# Patient Record
Sex: FEMALE | Race: BLACK OR AFRICAN AMERICAN | NOT HISPANIC OR LATINO | URBAN - METROPOLITAN AREA
[De-identification: names, ages, dates, MRNs, and addresses within clinical notes are randomized per-mention and may not be internally consistent; named-entity substitution may affect disease eponyms.]

---

## 2018-03-05 ENCOUNTER — INPATIENT (INPATIENT)
Facility: HOSPITAL | Age: 48
LOS: 3 days | Discharge: HOME | DRG: 262 | End: 2018-03-09
Attending: INTERNAL MEDICINE | Admitting: INTERNAL MEDICINE
Payer: COMMERCIAL

## 2018-03-05 VITALS
TEMPERATURE: 98 F | SYSTOLIC BLOOD PRESSURE: 143 MMHG | DIASTOLIC BLOOD PRESSURE: 76 MMHG | HEART RATE: 39 BPM | OXYGEN SATURATION: 100 % | RESPIRATION RATE: 18 BRPM

## 2018-03-05 DIAGNOSIS — R06.09 OTHER FORMS OF DYSPNEA: ICD-10-CM

## 2018-03-05 DIAGNOSIS — I47.1 SUPRAVENTRICULAR TACHYCARDIA: ICD-10-CM

## 2018-03-05 DIAGNOSIS — F41.9 ANXIETY DISORDER, UNSPECIFIED: ICD-10-CM

## 2018-03-05 DIAGNOSIS — R07.89 OTHER CHEST PAIN: ICD-10-CM

## 2018-03-05 LAB
ALBUMIN SERPL ELPH-MCNC: 3.8 G/DL — SIGNIFICANT CHANGE UP (ref 3.3–5)
ALP SERPL-CCNC: 74 U/L — SIGNIFICANT CHANGE UP (ref 40–120)
ALT FLD-CCNC: 11 U/L RC — SIGNIFICANT CHANGE UP (ref 10–45)
ANION GAP SERPL CALC-SCNC: 11 MMOL/L — SIGNIFICANT CHANGE UP (ref 5–17)
APPEARANCE UR: CLEAR — SIGNIFICANT CHANGE UP
APTT BLD: 30.8 SEC — SIGNIFICANT CHANGE UP (ref 27.5–37.4)
AST SERPL-CCNC: 22 U/L — SIGNIFICANT CHANGE UP (ref 10–40)
BASOPHILS # BLD AUTO: 0.1 K/UL — SIGNIFICANT CHANGE UP (ref 0–0.2)
BASOPHILS NFR BLD AUTO: 1.1 % — SIGNIFICANT CHANGE UP (ref 0–2)
BILIRUB SERPL-MCNC: 0.4 MG/DL — SIGNIFICANT CHANGE UP (ref 0.2–1.2)
BILIRUB UR-MCNC: NEGATIVE — SIGNIFICANT CHANGE UP
BUN SERPL-MCNC: 15 MG/DL — SIGNIFICANT CHANGE UP (ref 7–23)
CALCIUM SERPL-MCNC: 9.3 MG/DL — SIGNIFICANT CHANGE UP (ref 8.4–10.5)
CHLORIDE SERPL-SCNC: 103 MMOL/L — SIGNIFICANT CHANGE UP (ref 96–108)
CK MB CFR SERPL CALC: 1.4 NG/ML — SIGNIFICANT CHANGE UP (ref 0–3.8)
CK SERPL-CCNC: 90 U/L — SIGNIFICANT CHANGE UP (ref 25–170)
CO2 SERPL-SCNC: 26 MMOL/L — SIGNIFICANT CHANGE UP (ref 22–31)
COLOR SPEC: YELLOW — SIGNIFICANT CHANGE UP
CREAT SERPL-MCNC: 1.02 MG/DL — SIGNIFICANT CHANGE UP (ref 0.5–1.3)
D DIMER BLD IA.RAPID-MCNC: 338 NG/ML DDU — HIGH
DIFF PNL FLD: ABNORMAL
EOSINOPHIL # BLD AUTO: 0.1 K/UL — SIGNIFICANT CHANGE UP (ref 0–0.5)
EOSINOPHIL NFR BLD AUTO: 1.7 % — SIGNIFICANT CHANGE UP (ref 0–6)
EPI CELLS # UR: SIGNIFICANT CHANGE UP /HPF
GLUCOSE SERPL-MCNC: 89 MG/DL — SIGNIFICANT CHANGE UP (ref 70–99)
GLUCOSE UR QL: NEGATIVE — SIGNIFICANT CHANGE UP
HCT VFR BLD CALC: 41.5 % — SIGNIFICANT CHANGE UP (ref 34.5–45)
HGB BLD-MCNC: 14 G/DL — SIGNIFICANT CHANGE UP (ref 11.5–15.5)
INR BLD: 1.1 RATIO — SIGNIFICANT CHANGE UP (ref 0.88–1.16)
KETONES UR-MCNC: NEGATIVE — SIGNIFICANT CHANGE UP
LEUKOCYTE ESTERASE UR-ACNC: NEGATIVE — SIGNIFICANT CHANGE UP
LYMPHOCYTES # BLD AUTO: 1.5 K/UL — SIGNIFICANT CHANGE UP (ref 1–3.3)
LYMPHOCYTES # BLD AUTO: 30 % — SIGNIFICANT CHANGE UP (ref 13–44)
MCHC RBC-ENTMCNC: 31.5 PG — SIGNIFICANT CHANGE UP (ref 27–34)
MCHC RBC-ENTMCNC: 33.7 GM/DL — SIGNIFICANT CHANGE UP (ref 32–36)
MCV RBC AUTO: 93.5 FL — SIGNIFICANT CHANGE UP (ref 80–100)
MONOCYTES # BLD AUTO: 0.6 K/UL — SIGNIFICANT CHANGE UP (ref 0–0.9)
MONOCYTES NFR BLD AUTO: 12.6 % — SIGNIFICANT CHANGE UP (ref 2–14)
NEUTROPHILS # BLD AUTO: 2.7 K/UL — SIGNIFICANT CHANGE UP (ref 1.8–7.4)
NEUTROPHILS NFR BLD AUTO: 54.6 % — SIGNIFICANT CHANGE UP (ref 43–77)
NITRITE UR-MCNC: NEGATIVE — SIGNIFICANT CHANGE UP
NT-PROBNP SERPL-SCNC: 158 PG/ML — SIGNIFICANT CHANGE UP (ref 0–300)
PH UR: 7.5 — SIGNIFICANT CHANGE UP (ref 5–8)
PLATELET # BLD AUTO: 195 K/UL — SIGNIFICANT CHANGE UP (ref 150–400)
POTASSIUM SERPL-MCNC: 4.4 MMOL/L — SIGNIFICANT CHANGE UP (ref 3.5–5.3)
POTASSIUM SERPL-SCNC: 4.4 MMOL/L — SIGNIFICANT CHANGE UP (ref 3.5–5.3)
PROT SERPL-MCNC: 7.6 G/DL — SIGNIFICANT CHANGE UP (ref 6–8.3)
PROT UR-MCNC: SIGNIFICANT CHANGE UP
PROTHROM AB SERPL-ACNC: 11.9 SEC — SIGNIFICANT CHANGE UP (ref 9.8–12.7)
RBC # BLD: 4.44 M/UL — SIGNIFICANT CHANGE UP (ref 3.8–5.2)
RBC # FLD: 11.9 % — SIGNIFICANT CHANGE UP (ref 10.3–14.5)
RBC CASTS # UR COMP ASSIST: SIGNIFICANT CHANGE UP /HPF (ref 0–2)
SODIUM SERPL-SCNC: 140 MMOL/L — SIGNIFICANT CHANGE UP (ref 135–145)
SP GR SPEC: >1.03 — HIGH (ref 1.01–1.02)
TROPONIN T SERPL-MCNC: <0.01 NG/ML — SIGNIFICANT CHANGE UP (ref 0–0.06)
UROBILINOGEN FLD QL: NEGATIVE — SIGNIFICANT CHANGE UP
WBC # BLD: 4.9 K/UL — SIGNIFICANT CHANGE UP (ref 3.8–10.5)
WBC # FLD AUTO: 4.9 K/UL — SIGNIFICANT CHANGE UP (ref 3.8–10.5)

## 2018-03-05 PROCEDURE — 93010 ELECTROCARDIOGRAM REPORT: CPT

## 2018-03-05 PROCEDURE — 99285 EMERGENCY DEPT VISIT HI MDM: CPT | Mod: 25

## 2018-03-05 PROCEDURE — 71046 X-RAY EXAM CHEST 2 VIEWS: CPT | Mod: 26

## 2018-03-05 PROCEDURE — 99223 1ST HOSP IP/OBS HIGH 75: CPT | Mod: AI

## 2018-03-05 PROCEDURE — 71275 CT ANGIOGRAPHY CHEST: CPT | Mod: 26

## 2018-03-05 RX ORDER — ESCITALOPRAM OXALATE 10 MG/1
10 TABLET, FILM COATED ORAL DAILY
Qty: 0 | Refills: 0 | Status: DISCONTINUED | OUTPATIENT
Start: 2018-03-05 | End: 2018-03-09

## 2018-03-05 RX ORDER — ASPIRIN/CALCIUM CARB/MAGNESIUM 324 MG
324 TABLET ORAL ONCE
Qty: 0 | Refills: 0 | Status: COMPLETED | OUTPATIENT
Start: 2018-03-05 | End: 2018-03-05

## 2018-03-05 RX ORDER — FLUTICASONE PROPIONATE 50 MCG
1 SPRAY, SUSPENSION NASAL DAILY
Qty: 0 | Refills: 0 | Status: DISCONTINUED | OUTPATIENT
Start: 2018-03-05 | End: 2018-03-09

## 2018-03-05 RX ORDER — INFLUENZA VIRUS VACCINE 15; 15; 15; 15 UG/.5ML; UG/.5ML; UG/.5ML; UG/.5ML
0.5 SUSPENSION INTRAMUSCULAR ONCE
Qty: 0 | Refills: 0 | Status: COMPLETED | OUTPATIENT
Start: 2018-03-05 | End: 2018-03-05

## 2018-03-05 RX ORDER — HEPARIN SODIUM 5000 [USP'U]/ML
5000 INJECTION INTRAVENOUS; SUBCUTANEOUS EVERY 12 HOURS
Qty: 0 | Refills: 0 | Status: DISCONTINUED | OUTPATIENT
Start: 2018-03-05 | End: 2018-03-09

## 2018-03-05 RX ADMIN — Medication 324 MILLIGRAM(S): at 18:32

## 2018-03-05 NOTE — ED ADULT NURSE NOTE - OBJECTIVE STATEMENT
Pt sent in to r/o PE pt has had SHEETS for 2 weeks and shortness of breath Pox 100% Pt has heavy menses at this time.  pt is bradycardic in the 40s and is her norm. IVL placed and meds and bloods sent as ordered Eri

## 2018-03-05 NOTE — ED ADULT NURSE REASSESSMENT NOTE - NS ED NURSE REASSESS COMMENT FT1
1712 Pt placed in the hallway in red EKG done in triage and ot fully clothed and placed on the monitor.  MD at bedside to evaluate pt.  As per md no room need Mpuleoprn

## 2018-03-05 NOTE — H&P ADULT - HISTORY OF PRESENT ILLNESS
47 f h/o SVT s/p ablation, h/o migraine a/w worsening sob x 24 hours and left sided cp radiating to left arm. 47 f h/o SVT s/p ablation, h/o migraine a/w worsening sob x 24 hours and left sided cp radiating to left arm.  Pt states history began back 12/07 with similar sob/cp.  Went to OSH and told MVP/stress by hospital and outpt cards until similar episode 2012.  Saw Dr. Orta, patrick, and diagnosed with SVT.  s/p ablation with improved HR but occasionally would peak at 180s with exercise to started acebutolol.  HR then went to 40s and pt states this has been her baseline HR since 2012.  Pt states in last 6 years occasional, brief episodes of similar sob/cp but resolve quickly and pt has never sought medical attention.    Pt now states she developed the sob/cp approximately 24 hrs ago suddenly but this episode is not abating.  States symptoms become worse while talking and with any form of exertion.  Pt states cp starts at shoulder and spreads down across L.thorax into left abdomen and down to left leg.  She describes it as piercing and sharp.    She saw Dr. Orta this afternoon who sent pt to ED after she tried to walk and stated she was unable 2nd sensation of "darkness" and unsteadiness though denies light-headedness.  Pt states she had another brief episode of this accompanied by nausea last week when she started a new vitamin.  After symptoms developed she read label and realized it contained caffeine.  Pt has not taken this med again and symptoms that day resolved quickly.  No f/c.  Nl appetite.  No sick contacts.  No uri symptoms.  No gu/gi symptoms.  No s/s bleeding.      In ED reviewed tele with tech and hr fluctuant between high 30s and high 40s.  Given ASA 325mg and flu shot.

## 2018-03-05 NOTE — H&P ADULT - PROBLEM SELECTOR PLAN 1
given pain is reproducible doubt cardiac etiology though given cardiac history will r/o ACS with Manas x 3  monitor on telemetry for arrhythmia  new t depression in V3 only, repeat ekg in am for trend

## 2018-03-05 NOTE — H&P ADULT - NSHPLABSRESULTS_GEN_ALL_CORE
labs/studies/ekg reviewed personally by me  CT chest pectus excavatum, no PE, clear lungs  cbc wnl  coags wnl  bnp 158  ce neg x 1  bun 15, creat 1.02  gluc 89

## 2018-03-05 NOTE — H&P ADULT - PROBLEM SELECTOR PLAN 3
no evidence of SVT thus far  will need to hold beta blocker with current bradycardia, if pt develops SVT ? PPM with beta blocker - per cardiology

## 2018-03-05 NOTE — H&P ADULT - PROBLEM SELECTOR PLAN 2
unclear etiology  CTA negative for PE or pulmonary pathology  will ck TTE to evaluate cardiac function and valves  doubt 2nd bradycardia which is chronic per pt on beta blocker

## 2018-03-05 NOTE — H&P ADULT - PROBLEM SELECTOR PLAN 4
pt requires education on increased efficacy of lexapro if taken daily as she only takes this 3-4x/week  pt states uses ativan 1-2/x for anxiety

## 2018-03-05 NOTE — ED PROVIDER NOTE - MEDICAL DECISION MAKING DETAILS
pt with significant SHEETS since yesterday and L sided CP. will obtain ekg, place on tele, labs including d-dimer, cardiac enzymes and admit.

## 2018-03-05 NOTE — ED PROVIDER NOTE - PROGRESS NOTE DETAILS
attending Alee: pt's cardiologist Dr. Marivel hui attending Alee: discussed with Dr. Orta's NP. agrees with d-dimer, enzymes and check for anemia given recent heavy menses and SHEETS. Pt will require admission, plan to admit under Dr. Nichole.

## 2018-03-05 NOTE — ED PROVIDER NOTE - OBJECTIVE STATEMENT
attending Alee: 47yF h/o prior SVT s/p ablation presents with sudden onset SOB x 24 hours. SOB worse on exertion and with talking. Also with associated L sided chest pain radiating to L arm. Seen by cardiologist Dr. Orta today and sent into ER for evaluation. LMP several days ago with heavy vaginal bleeding but denies prior anemia or blood transfusions. Denies syncope, LE edema, calf pain, recent travel/hospitalizations, exogenous estrogen or prior DVT or PE.

## 2018-03-06 DIAGNOSIS — R31.29 OTHER MICROSCOPIC HEMATURIA: ICD-10-CM

## 2018-03-06 LAB
ANION GAP SERPL CALC-SCNC: 11 MMOL/L — SIGNIFICANT CHANGE UP (ref 5–17)
APPEARANCE UR: ABNORMAL
BASOPHILS # BLD AUTO: 0 K/UL — SIGNIFICANT CHANGE UP (ref 0–0.2)
BASOPHILS NFR BLD AUTO: 0.8 % — SIGNIFICANT CHANGE UP (ref 0–2)
BILIRUB UR-MCNC: NEGATIVE — SIGNIFICANT CHANGE UP
BUN SERPL-MCNC: 15 MG/DL — SIGNIFICANT CHANGE UP (ref 7–23)
CALCIUM SERPL-MCNC: 9.1 MG/DL — SIGNIFICANT CHANGE UP (ref 8.4–10.5)
CHLORIDE SERPL-SCNC: 104 MMOL/L — SIGNIFICANT CHANGE UP (ref 96–108)
CK MB BLD-MCNC: 1.3 % — SIGNIFICANT CHANGE UP (ref 0–3.5)
CK MB CFR SERPL CALC: 1 NG/ML — SIGNIFICANT CHANGE UP (ref 0–3.8)
CK MB CFR SERPL CALC: 1.1 NG/ML — SIGNIFICANT CHANGE UP (ref 0–3.8)
CK SERPL-CCNC: 78 U/L — SIGNIFICANT CHANGE UP (ref 25–170)
CK SERPL-CCNC: 80 U/L — SIGNIFICANT CHANGE UP (ref 25–170)
CO2 SERPL-SCNC: 26 MMOL/L — SIGNIFICANT CHANGE UP (ref 22–31)
COLOR SPEC: YELLOW — SIGNIFICANT CHANGE UP
CREAT SERPL-MCNC: 0.99 MG/DL — SIGNIFICANT CHANGE UP (ref 0.5–1.3)
DIFF PNL FLD: ABNORMAL
EOSINOPHIL # BLD AUTO: 0.1 K/UL — SIGNIFICANT CHANGE UP (ref 0–0.5)
EOSINOPHIL NFR BLD AUTO: 1.7 % — SIGNIFICANT CHANGE UP (ref 0–6)
EPI CELLS # UR: SIGNIFICANT CHANGE UP /HPF
GLUCOSE SERPL-MCNC: 119 MG/DL — HIGH (ref 70–99)
GLUCOSE UR QL: NEGATIVE — SIGNIFICANT CHANGE UP
HCG SERPL-ACNC: <2 MIU/ML — LOW (ref 5–24)
HCT VFR BLD CALC: 39.2 % — SIGNIFICANT CHANGE UP (ref 34.5–45)
HGB BLD-MCNC: 13.5 G/DL — SIGNIFICANT CHANGE UP (ref 11.5–15.5)
KETONES UR-MCNC: NEGATIVE — SIGNIFICANT CHANGE UP
LEUKOCYTE ESTERASE UR-ACNC: NEGATIVE — SIGNIFICANT CHANGE UP
LYMPHOCYTES # BLD AUTO: 1.2 K/UL — SIGNIFICANT CHANGE UP (ref 1–3.3)
LYMPHOCYTES # BLD AUTO: 26.7 % — SIGNIFICANT CHANGE UP (ref 13–44)
MAGNESIUM SERPL-MCNC: 2 MG/DL — SIGNIFICANT CHANGE UP (ref 1.6–2.6)
MCHC RBC-ENTMCNC: 32.2 PG — SIGNIFICANT CHANGE UP (ref 27–34)
MCHC RBC-ENTMCNC: 34.6 GM/DL — SIGNIFICANT CHANGE UP (ref 32–36)
MCV RBC AUTO: 93.1 FL — SIGNIFICANT CHANGE UP (ref 80–100)
MONOCYTES # BLD AUTO: 0.6 K/UL — SIGNIFICANT CHANGE UP (ref 0–0.9)
MONOCYTES NFR BLD AUTO: 12.3 % — SIGNIFICANT CHANGE UP (ref 2–14)
NEUTROPHILS # BLD AUTO: 2.7 K/UL — SIGNIFICANT CHANGE UP (ref 1.8–7.4)
NEUTROPHILS NFR BLD AUTO: 58.5 % — SIGNIFICANT CHANGE UP (ref 43–77)
NITRITE UR-MCNC: NEGATIVE — SIGNIFICANT CHANGE UP
PH UR: 5.5 — SIGNIFICANT CHANGE UP (ref 5–8)
PHOSPHATE SERPL-MCNC: 3.2 MG/DL — SIGNIFICANT CHANGE UP (ref 2.5–4.5)
PLATELET # BLD AUTO: 188 K/UL — SIGNIFICANT CHANGE UP (ref 150–400)
POTASSIUM SERPL-MCNC: 4.1 MMOL/L — SIGNIFICANT CHANGE UP (ref 3.5–5.3)
POTASSIUM SERPL-SCNC: 4.1 MMOL/L — SIGNIFICANT CHANGE UP (ref 3.5–5.3)
PROT UR-MCNC: NEGATIVE — SIGNIFICANT CHANGE UP
RBC # BLD: 4.2 M/UL — SIGNIFICANT CHANGE UP (ref 3.8–5.2)
RBC # FLD: 12 % — SIGNIFICANT CHANGE UP (ref 10.3–14.5)
RBC CASTS # UR COMP ASSIST: SIGNIFICANT CHANGE UP /HPF (ref 0–2)
SODIUM SERPL-SCNC: 141 MMOL/L — SIGNIFICANT CHANGE UP (ref 135–145)
SP GR SPEC: >1.03 — HIGH (ref 1.01–1.02)
TROPONIN T SERPL-MCNC: <0.01 NG/ML — SIGNIFICANT CHANGE UP (ref 0–0.06)
TROPONIN T SERPL-MCNC: <0.01 NG/ML — SIGNIFICANT CHANGE UP (ref 0–0.06)
UROBILINOGEN FLD QL: NEGATIVE — SIGNIFICANT CHANGE UP
WBC # BLD: 4.6 K/UL — SIGNIFICANT CHANGE UP (ref 3.8–10.5)
WBC # FLD AUTO: 4.6 K/UL — SIGNIFICANT CHANGE UP (ref 3.8–10.5)
WBC UR QL: SIGNIFICANT CHANGE UP /HPF (ref 0–5)

## 2018-03-06 PROCEDURE — 93010 ELECTROCARDIOGRAM REPORT: CPT

## 2018-03-06 PROCEDURE — 93306 TTE W/DOPPLER COMPLETE: CPT | Mod: 26

## 2018-03-06 RX ORDER — ACETAMINOPHEN 500 MG
650 TABLET ORAL ONCE
Qty: 0 | Refills: 0 | Status: COMPLETED | OUTPATIENT
Start: 2018-03-06 | End: 2018-03-06

## 2018-03-06 RX ORDER — ONDANSETRON 8 MG/1
4 TABLET, FILM COATED ORAL ONCE
Qty: 0 | Refills: 0 | Status: COMPLETED | OUTPATIENT
Start: 2018-03-06 | End: 2018-03-06

## 2018-03-06 RX ADMIN — HEPARIN SODIUM 5000 UNIT(S): 5000 INJECTION INTRAVENOUS; SUBCUTANEOUS at 18:38

## 2018-03-06 RX ADMIN — HEPARIN SODIUM 5000 UNIT(S): 5000 INJECTION INTRAVENOUS; SUBCUTANEOUS at 05:32

## 2018-03-06 RX ADMIN — Medication 650 MILLIGRAM(S): at 12:51

## 2018-03-06 RX ADMIN — ONDANSETRON 4 MILLIGRAM(S): 8 TABLET, FILM COATED ORAL at 03:00

## 2018-03-06 RX ADMIN — Medication 1 TABLET(S): at 12:10

## 2018-03-06 RX ADMIN — ESCITALOPRAM OXALATE 10 MILLIGRAM(S): 10 TABLET, FILM COATED ORAL at 12:10

## 2018-03-06 RX ADMIN — Medication 650 MILLIGRAM(S): at 11:50

## 2018-03-06 RX ADMIN — Medication 1 SPRAY(S): at 12:10

## 2018-03-06 NOTE — CONSULT NOTE ADULT - SUBJECTIVE AND OBJECTIVE BOX
CARDIOLOGY CONSULT - Dr. Marie     CHIEF COMPLAINT: sob / chest pain     HPI:  47 f h/o SVT s/p ablation, h/o migraine admitted with worsening sob x 24 hours and left sided cp radiating to left arm.  Pt states history began back  with similar sob/cp.  Went to OSH and told MVP/stress by hospital and outpt cards until similar episode .  Saw Dr. Orta, patrick, and diagnosed with SVT.  s/p ablation with improved HR but occasionally would peak at 180s with exercise to started acebutolol.  HR then went to 40s and pt states this has been her baseline HR since .  Pt states in last 6 years occasional, brief episodes of similar sob/cp but resolve quickly and pt has never sought medical attention.    Pt now states she developed the sob/cp approximately 24 hrs ago suddenly but this episode is not abating.  States symptoms become worse while talking and with any form of exertion.  Pt states cp starts at shoulder and spreads down across L.thorax into left abdomen and down to left leg.  She describes it as piercing and sharp.    She saw Dr. Orta this afternoon who sent pt to ED after she tried to walk and stated she was unable 2nd sensation of "darkness" and unsteadiness though denies light-headedness.  Pt states she had another brief episode of this accompanied by nausea last week when she started a new vitamin.  After symptoms developed she read label and realized it contained caffeine.  Pt has not taken this med again and symptoms that day resolved quickly.  No f/c.  Nl appetite.  No sick contacts.  No uri symptoms.  No gu/gi symptoms.  No s/s bleeding.      In ED on tele  hr fluctuant between high 30s and high 40s.  Given ASA 325mg and flu shot.      PAST MEDICAL & SURGICAL HISTORY:  Migraine: with menstrual cycle  SVT (Supraventricular Tachycardia)   delivery delivered: 16 years ago          PREVIOUS DIAGNOSTIC TESTING:    [ ] Echocardiogram:    [ ]  Catheterization:    [ ] Stress Test:  	    MEDICATIONS:  MEDICATIONS  (STANDING):  escitalopram 10 milliGRAM(s) Oral daily  fluticasone propionate 50 MICROgram(s)/spray Nasal Spray 1 Spray(s) Both Nostrils daily  heparin  Injectable 5000 Unit(s) SubCutaneous every 12 hours  influenza   Vaccine 0.5 milliLiter(s) IntraMuscular once  multivitamin 1 Tablet(s) Oral daily      FAMILY HISTORY:      SOCIAL HISTORY:    [ ] Non-smoker  [ ] Smoker  [ ] Alcohol    Allergies    bacitracin (Rash)    Intolerances    	    REVIEW OF SYSTEMS:  CONSTITUTIONAL: No fever, weight loss, or fatigue  EYES: No eye pain, visual disturbances, or discharge  ENMT:  No difficulty hearing, tinnitus, vertigo; No sinus or throat pain  NECK: No pain or stiffness  RESPIRATORY: No cough, wheezing, chills or hemoptysis; No Shortness of Breath  CARDIOVASCULAR: No chest pain, palpitations, passing out, dizziness, or leg swelling  GASTROINTESTINAL: No abdominal or epigastric pain. No nausea, vomiting, or hematemesis; No diarrhea or constipation. No melena or hematochezia.  GENITOURINARY: No dysuria, frequency, hematuria, or incontinence  NEUROLOGICAL: No headaches, memory loss, loss of strength, numbness, or tremors  SKIN: No itching, burning, rashes, or lesions   	    [ ] All others negative	  [ ] Unable to obtain    PHYSICAL EXAM:  T(C): 36.8 (18 @ 05:30), Max: 36.8 (18 @ 21:09)  HR: 49 (18 @ 05:30) (39 - 49)  BP: 92/50 (18 @ 05:30) (90/51 - 143/76)  RR: 17 (18 @ 05:30) (16 - 18)  SpO2: 98% (18 @ 05:30) (97% - 100%)  Wt(kg): --  I&O's Summary    05 Mar 2018 07  -  06 Mar 2018 07:00  --------------------------------------------------------  IN: 240 mL / OUT: 0 mL / NET: 240 mL    06 Mar 2018 07:01  -  06 Mar 2018 09:08  --------------------------------------------------------  IN: 240 mL / OUT: 0 mL / NET: 240 mL        Appearance: Normal	  Psychiatry: A & O x 3, Mood & affect appropriate  HEENT:   Normal oral mucosa, PERRL, EOMI	  Lymphatic: No lymphadenopathy  Cardiovascular: Normal S1 S2,RRR, No JVD, No murmurs  Respiratory: Lungs clear to auscultation	  Gastrointestinal:  Soft, Non-tender, + BS	  Skin: No rashes, No ecchymoses, No cyanosis	  Neurologic: Non-focal  Extremities: Normal range of motion, No clubbing, cyanosis or edema  Vascular: Peripheral pulses palpable 2+ bilaterally    TELEMETRY: 	    ECG:  	  RADIOLOGY:  < from: Xray Chest 2 Views PA/Lat (18 @ 19:02) >    FINDINGS:     There is a right middle lobe opacity due to a pectus excavatum.  There is no pleural effusion or pneumothorax.  The heart size is normal.  There is no acute osseous abnormality.    IMPRESSION:    No acute disease. Pectus excavatum.          OTHER: 	  < from: CT Angio Chest w/ IV Cont (18 @ 19:25) >    IMPRESSION:     No pulmonary embolism. Clear lungs.      LABS:	 	    CARDIAC MARKERS:                                  14.0   4.9   )-----------( 195      ( 05 Mar 2018 18:00 )             41.5     03-05    140  |  103  |  15  ----------------------------<  89  4.4   |  26  |  1.02    Ca    9.3      05 Mar 2018 18:00    TPro  7.6  /  Alb  3.8  /  TBili  0.4  /  DBili  x   /  AST  22  /  ALT  11  /  AlkPhos  74  03-05    PT/INR - ( 05 Mar 2018 18:00 )   PT: 11.9 sec;   INR: 1.10 ratio         PTT - ( 05 Mar 2018 18:00 )  PTT:30.8 sec  proBNP: Serum Pro-Brain Natriuretic Peptide: 158 pg/mL ( @ 18:00)    Lipid Profile:   HgA1c:   TSH: CARDIOLOGY CONSULT - Dr. Marie     CHIEF COMPLAINT: sob / chest pain     HPI:  47 f h/o SVT s/p ablation, h/o migraine admitted with worsening sob x 24 hours and left sided cp radiating to left arm.  Pt states history began back 2007 with similar sob/cp.  At that time was told to have MVP and a mild MI.  patient reports having similiar episodes back in , saw her oupt cardiologist and was diagnosed with SVT.  She had ablation but occasionally her HR would peak at 180s which was when  she was  started acebutolol.  Since shes been on acebutolol her baseline HR been in the  40s.    Pt now states she developed acute onset of sob/cp approximately 24 hrs ago. States symptoms become worse while talking and with any form of exertion.  Pt states cp starts at shoulder and spreads down across L.thorax into left abdomen and down to left leg.  She describes it as piercing and sharp.    She saw Dr. Orta this afternoon who sent pt to ED after she tried to walk and stated she was unable secondary to sensation of "darkness" and unsteadiness though denies light-headedness.  Reports she hasnt had a recent echo or stress test. Reported feeling fatigued and nauseas last night. On tele noted to have HR in the 30s with > 2- 3  sec pauses. Denies any recent sick contacts, fever, chills, edema, orthopnea. ROS otherwise negative       PAST MEDICAL & SURGICAL HISTORY:  Migraine: with menstrual cycle  SVT (Supraventricular Tachycardia)   delivery delivered: 16 years ago          PREVIOUS DIAGNOSTIC TESTING:    [ ] Echocardiogram:    [ ]  Catheterization:    [ ] Stress Test:  	    MEDICATIONS:  MEDICATIONS  (STANDING):  escitalopram 10 milliGRAM(s) Oral daily  fluticasone propionate 50 MICROgram(s)/spray Nasal Spray 1 Spray(s) Both Nostrils daily  heparin  Injectable 5000 Unit(s) SubCutaneous every 12 hours  influenza   Vaccine 0.5 milliLiter(s) IntraMuscular once  multivitamin 1 Tablet(s) Oral daily      FAMILY HISTORY:      SOCIAL HISTORY:    [ x] Non-smoker  [ ] Smoker  [ ] Alcohol    Allergies    bacitracin (Rash)    Intolerances    	    REVIEW OF SYSTEMS:  CONSTITUTIONAL: No fever, weight loss, + fatigue  EYES: No eye pain, visual disturbances, or discharge  ENMT:  No difficulty hearing, tinnitus, vertigo; No sinus or throat pain  NECK: No pain or stiffness  RESPIRATORY: No cough, wheezing, chills or hemoptysis; + Shortness of Breath  CARDIOVASCULAR: No, palpitations, passing out, dizziness, or leg swelling +  chest pain  GASTROINTESTINAL: No abdominal or epigastric pain. No nausea, vomiting, or hematemesis; No diarrhea or constipation. No melena or hematochezia.  GENITOURINARY: No dysuria, frequency, hematuria, or incontinence  NEUROLOGICAL: No headaches, memory loss, loss of strength, numbness, or tremors  SKIN: No itching, burning, rashes, or lesions   	    [ x] All others negative	  [ ] Unable to obtain    PHYSICAL EXAM:  T(C): 36.8 (18 @ 05:30), Max: 36.8 (18 @ 21:09)  HR: 49 (18 @ 05:30) (39 - 49)  BP: 92/50 (18 @ 05:30) (90/51 - 143/76)  RR: 17 (18 @ 05:30) (16 - 18)  SpO2: 98% (18 @ 05:30) (97% - 100%)  Wt(kg): --  I&O's Summary    05 Mar 2018 07:  -  06 Mar 2018 07:00  --------------------------------------------------------  IN: 240 mL / OUT: 0 mL / NET: 240 mL    06 Mar 2018 07:  -  06 Mar 2018 09:08  --------------------------------------------------------  IN: 240 mL / OUT: 0 mL / NET: 240 mL        Appearance: Normal	  Psychiatry: A & O x 3, Mood & affect appropriate  HEENT:   Normal oral mucosa, PERRL, EOMI	  Lymphatic: No lymphadenopathy  Cardiovascular: Normal S1 S2,Rhythm reg, bradycardic   Respiratory: Lungs clear to auscultation	  Gastrointestinal:  Soft, Non-tender, + BS	  Skin: No rashes, No ecchymoses, No cyanosis	  Neurologic: Non-focal  Extremities: Normal range of motion, No clubbing, cyanosis or edema  Vascular: Peripheral pulses palpable 2+ bilaterally    TELEMETRY: sinus bradycardia hr 50s   pauses > 2 sec noted 	    ECG:  sinus bradycardia HR 36   old septal infarct 	  RADIOLOGY:  < from: Xray Chest 2 Views PA/Lat (18 @ 19:02) >    FINDINGS:     There is a right middle lobe opacity due to a pectus excavatum.  There is no pleural effusion or pneumothorax.  The heart size is normal.  There is no acute osseous abnormality.    IMPRESSION:    No acute disease. Pectus excavatum.          OTHER: 	  < from: CT Angio Chest w/ IV Cont (18 @ 19:25) >    IMPRESSION:     No pulmonary embolism. Clear lungs.      LABS:	 	    CARDIAC MARKERS:                                  14.0   4.9   )-----------( 195      ( 05 Mar 2018 18:00 )             41.5     03-    140  |  103  |  15  ----------------------------<  89  4.4   |  26  |  1.02    Ca    9.3      05 Mar 2018 18:00    TPro  7.6  /  Alb  3.8  /  TBili  0.4  /  DBili  x   /  AST  22  /  ALT  11  /  AlkPhos  74  -    PT/INR - ( 05 Mar 2018 18:00 )   PT: 11.9 sec;   INR: 1.10 ratio         PTT - ( 05 Mar 2018 18:00 )  PTT:30.8 sec  proBNP: Serum Pro-Brain Natriuretic Peptide: 158 pg/mL ( @ 18:00)    Lipid Profile:   HgA1c:   TSH:

## 2018-03-07 ENCOUNTER — TRANSCRIPTION ENCOUNTER (OUTPATIENT)
Age: 48
End: 2018-03-07

## 2018-03-07 LAB
ANION GAP SERPL CALC-SCNC: 8 MMOL/L — SIGNIFICANT CHANGE UP (ref 5–17)
BUN SERPL-MCNC: 14 MG/DL — SIGNIFICANT CHANGE UP (ref 7–23)
CALCIUM SERPL-MCNC: 8.9 MG/DL — SIGNIFICANT CHANGE UP (ref 8.4–10.5)
CHLORIDE SERPL-SCNC: 106 MMOL/L — SIGNIFICANT CHANGE UP (ref 96–108)
CO2 SERPL-SCNC: 27 MMOL/L — SIGNIFICANT CHANGE UP (ref 22–31)
CREAT SERPL-MCNC: 0.99 MG/DL — SIGNIFICANT CHANGE UP (ref 0.5–1.3)
GLUCOSE SERPL-MCNC: 72 MG/DL — SIGNIFICANT CHANGE UP (ref 70–99)
POTASSIUM SERPL-MCNC: 4.1 MMOL/L — SIGNIFICANT CHANGE UP (ref 3.5–5.3)
POTASSIUM SERPL-SCNC: 4.1 MMOL/L — SIGNIFICANT CHANGE UP (ref 3.5–5.3)
SODIUM SERPL-SCNC: 141 MMOL/L — SIGNIFICANT CHANGE UP (ref 135–145)

## 2018-03-07 PROCEDURE — 75574 CT ANGIO HRT W/3D IMAGE: CPT | Mod: 26

## 2018-03-07 RX ADMIN — ESCITALOPRAM OXALATE 10 MILLIGRAM(S): 10 TABLET, FILM COATED ORAL at 11:37

## 2018-03-07 RX ADMIN — Medication 1 TABLET(S): at 11:38

## 2018-03-07 RX ADMIN — HEPARIN SODIUM 5000 UNIT(S): 5000 INJECTION INTRAVENOUS; SUBCUTANEOUS at 05:35

## 2018-03-07 NOTE — CHART NOTE - NSCHARTNOTEFT_GEN_A_CORE
PA called by cardiology fellow Colin Epstein regarding patient's Exercise nuclear stress test. Dr. Epstein has spoken with Dr. Marie and indicated that at CT heart would yield a better result in this patient's condition.     PATY Shaw [id number]

## 2018-03-08 DIAGNOSIS — R29.898 OTHER SYMPTOMS AND SIGNS INVOLVING THE MUSCULOSKELETAL SYSTEM: ICD-10-CM

## 2018-03-08 DIAGNOSIS — R00.1 BRADYCARDIA, UNSPECIFIED: ICD-10-CM

## 2018-03-08 LAB
ANION GAP SERPL CALC-SCNC: 11 MMOL/L — SIGNIFICANT CHANGE UP (ref 5–17)
BUN SERPL-MCNC: 13 MG/DL — SIGNIFICANT CHANGE UP (ref 7–23)
CALCIUM SERPL-MCNC: 8.9 MG/DL — SIGNIFICANT CHANGE UP (ref 8.4–10.5)
CHLORIDE SERPL-SCNC: 105 MMOL/L — SIGNIFICANT CHANGE UP (ref 96–108)
CO2 SERPL-SCNC: 24 MMOL/L — SIGNIFICANT CHANGE UP (ref 22–31)
CREAT SERPL-MCNC: 0.91 MG/DL — SIGNIFICANT CHANGE UP (ref 0.5–1.3)
FOLATE SERPL-MCNC: >20 NG/ML — SIGNIFICANT CHANGE UP (ref 4.8–24.2)
GLUCOSE SERPL-MCNC: 100 MG/DL — HIGH (ref 70–99)
HCT VFR BLD CALC: 41.3 % — SIGNIFICANT CHANGE UP (ref 34.5–45)
HGB BLD-MCNC: 13.8 G/DL — SIGNIFICANT CHANGE UP (ref 11.5–15.5)
MAGNESIUM SERPL-MCNC: 1.9 MG/DL — SIGNIFICANT CHANGE UP (ref 1.6–2.6)
MCHC RBC-ENTMCNC: 31.3 PG — SIGNIFICANT CHANGE UP (ref 27–34)
MCHC RBC-ENTMCNC: 33.4 GM/DL — SIGNIFICANT CHANGE UP (ref 32–36)
MCV RBC AUTO: 93.9 FL — SIGNIFICANT CHANGE UP (ref 80–100)
PHOSPHATE SERPL-MCNC: 3 MG/DL — SIGNIFICANT CHANGE UP (ref 2.5–4.5)
PLATELET # BLD AUTO: 192 K/UL — SIGNIFICANT CHANGE UP (ref 150–400)
POTASSIUM SERPL-MCNC: 4.3 MMOL/L — SIGNIFICANT CHANGE UP (ref 3.5–5.3)
POTASSIUM SERPL-SCNC: 4.3 MMOL/L — SIGNIFICANT CHANGE UP (ref 3.5–5.3)
RBC # BLD: 4.39 M/UL — SIGNIFICANT CHANGE UP (ref 3.8–5.2)
RBC # FLD: 11.9 % — SIGNIFICANT CHANGE UP (ref 10.3–14.5)
SODIUM SERPL-SCNC: 140 MMOL/L — SIGNIFICANT CHANGE UP (ref 135–145)
TSH SERPL-MCNC: 1.52 UIU/ML — SIGNIFICANT CHANGE UP (ref 0.27–4.2)
VIT B12 SERPL-MCNC: 1212 PG/ML — SIGNIFICANT CHANGE UP (ref 232–1245)
WBC # BLD: 5 K/UL — SIGNIFICANT CHANGE UP (ref 3.8–10.5)
WBC # FLD AUTO: 5 K/UL — SIGNIFICANT CHANGE UP (ref 3.8–10.5)

## 2018-03-08 PROCEDURE — 99223 1ST HOSP IP/OBS HIGH 75: CPT

## 2018-03-08 RX ADMIN — Medication 1 TABLET(S): at 17:38

## 2018-03-08 RX ADMIN — HEPARIN SODIUM 5000 UNIT(S): 5000 INJECTION INTRAVENOUS; SUBCUTANEOUS at 17:38

## 2018-03-08 RX ADMIN — Medication 0.5 MILLIGRAM(S): at 22:44

## 2018-03-08 RX ADMIN — HEPARIN SODIUM 5000 UNIT(S): 5000 INJECTION INTRAVENOUS; SUBCUTANEOUS at 05:27

## 2018-03-08 NOTE — DISCHARGE NOTE ADULT - CARE PROVIDER_API CALL
Colin Orta), Cardiovascular Disease  [address] [facility] [address]  [place] [address]  Phone: [phone]  Fax: [phone] Colin Orta (MD), Cardiovascular Disease  [address] [facility]  [address]  [place] [address]  Phone: [phone]  Fax: [phone]    Colin Astudillo (DO), Neurology  [address] [place] [address]  Phone: [phone]  Fax: [phone]    Jen Guthrie), Cardiac Electrophysiology; Cardiovascular Disease  [address] [place], [place] [id number]  Phone: [phone]  Fax: [phone] Colin Orta (MD), Cardiovascular Disease  [address] [facility]  [address]  [place] [address]  Phone: [phone]  Fax: [phone]    Colin Astudillo (DO), Neurology  [address] [place] [address]  Phone: [phone]  Fax: [phone]    Jen Guthrie), Cardiac Electrophysiology; Cardiovascular Disease  [address] [place], [place] [id number]  Phone: [phone]  Fax: [phone]    Humberto Ferris), Internal Medicine; Pulmonary Disease  [address]  [place] [address]  Phone: [phone]  Fax: [phone]

## 2018-03-08 NOTE — DISCHARGE NOTE ADULT - PROVIDER TOKENS
LESLIE:'[id number]' TOKEN:'[id number]',TOKEN:'[id number]:'[id number]' TOKEN:'[id number]',TOKEN:'[id number]:'[id number]',[id number]'

## 2018-03-08 NOTE — CONSULT NOTE ADULT - SUBJECTIVE AND OBJECTIVE BOX
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, lifelong non-smoker, with history of SVT s/p ablation admitted yesterday    PMHX:  Migraine  SVT (Supraventricular Tachycardia)/Ablation      PSHX:   delivery delivered      FAMILY HISTORY:      SOCIAL HISTORY:    Pulmonary Medications:       Antimicrobials:      Cardiology:      Other:  artificial  tears Solution 1 Drop(s) Both EYES every 8 hours PRN  escitalopram 10 milliGRAM(s) Oral daily  fluticasone propionate 50 MICROgram(s)/spray Nasal Spray 1 Spray(s) Both Nostrils daily  heparin  Injectable 5000 Unit(s) SubCutaneous every 12 hours  LORazepam     Tablet 1 milliGRAM(s) Oral daily PRN  multivitamin 1 Tablet(s) Oral daily      Allergies    bacitracin (Rash)    Intolerances        HOME MEDICATIONS: see  H & P    REVIEW OF SYSTEMS:  Constitutional: As per HPI  HEENT: Within normal limits  CV: As per HPI  Resp: As per HPI  GI: Within normal limits   : Within normal limits  Musculoskeletal: Within normal limits  Skin: Within normal limits  Neurological: Within normal limits  Psychiatric: Within normal limits  Endocrine: Within normal limits  Hematologic/Lymphatic: Within normal limits  Allergic/Immunologic: Within normal limits    [ ] All other systems negative    OBJECTIVE:      I&O's Detail    07 Mar 2018 07:  -  08 Mar 2018 07:00  --------------------------------------------------------  IN:    Oral Fluid: 960 mL  Total IN: 960 mL    OUT:  Total OUT: 0 mL    Total NET: 960 mL      08 Mar 2018 07:  -  08 Mar 2018 12:17  --------------------------------------------------------  IN:    Oral Fluid: 240 mL  Total IN: 240 mL    OUT:  Total OUT: 0 mL    Total NET: 240 mL    PHYSICAL EXAM:  ICU Vital Signs Last 24 Hrs  T(C): 36.7 (08 Mar 2018 05:01), Max: 36.8 (07 Mar 2018 21:26)  T(F): 98.1 (08 Mar 2018 05:01), Max: 98.2 (07 Mar 2018 21:26)  HR: 55 (08 Mar 2018 07:16) (41 - 55)  BP: 111/71 (08 Mar 2018 07:16) (100/49 - 115/61)  BP(mean): --  ABP: --  ABP(mean): --  RR: 18 (08 Mar 2018 07:16) (16 - 18)  SpO2: 97% (08 Mar 2018 07:16) (97% - 98%)    General: Awake. Alert. Cooperative. No distress. Appears stated age 	  HEENT:   Atraumatic. Normocephalic. Anicteric. Normal oral mucosa, PERRL, EOMI  Neck: Supple. Trachea midline. Thyroid without enlargement/tenderness/nodules. No carotid bruit. No JVD	  Cardiovascular: Regular rate and rhythm. S1 S2 normal. No murmurs, rubs or gallops  Respiratory: Respirations unlabored. Clear to auscultation and percussion bilaterally  Abdomen: Soft. Non-tender. Non-distended. No organomegaly. No masses. Normal bowel sounds	  Extremities: Warm to touch. No clubbing or cyanosis. No pedal edema.  Pulses: 2+ peripheral pulses all extremities	  Skin: Normal skin color. No rashes or lesions. No ecchymoses, No cyanosis. Warm to touch  Lymph Nodes: Cervical, supraclavicular and axillary nodes normal  Neurological: Motor and sensory examination equal and normal. A and O x 3  Psychiatry: Appropriate mood and affect.      LABS:    Complete Blood Count + Automated Diff (18 @ 10:45)    WBC Count: 4.6 K/uL    RBC Count: 4.20 M/uL    Hemoglobin: 13.5 g/dL    Hematocrit: 39.2 %    Mean Cell Volume: 93.1 fl    Mean Cell Hemoglobin: 32.2 pg    Mean Cell Hemoglobin Conc: 34.6 gm/dL    Red Cell Distrib Width: 12.0 %    Platelet Count - Automated: 188 K/uL    Auto Neutrophil #: 2.7 K/uL    Auto Lymphocyte #: 1.2 K/uL    Auto Monocyte #: 0.6 K/uL    Auto Eosinophil #: 0.1 K/uL    Auto Basophil #: 0.0 K/uL    Auto Neutrophil %: 58.5: Differential percentages must be correlated with absolute numbers for  clinical significance. %    Auto Lymphocyte %: 26.7 %    Auto Monocyte %: 12.3 %    Auto Eosinophil %: 1.7 %    Auto Basophil %: 0.8 %        141  |  106  |  14  ----------------------------<  72  4.1   |  27  |  0.99    -    141  |  104  |  15  ----------------------------<  119<H>  4.1   |  26  |  0.99    Ca      8.9      -    Ca      9.1          Phos    3.2     -      Mg       2.0     -    TPro  7.6  /  Alb  3.8  /  TBili  0.4  /  DBili  x   /  AST  22  /  ALT  11  /  AlkPhos  74      Serum Pro-Brain Natriuretic Peptide: 158 pg/mL ( @ 18:00)    D-Dimer Assay, Quantitative: 338 ng/mL DDU ( @ 18:00)    CARDIAC MARKERS ( 06 Mar 2018 10:45 )  x     / <0.01 ng/mL / 78 U/L / x     / 1.0 ng/mL  CARDIAC MARKERS ( 06 Mar 2018 02:29 )  x     / <0.01 ng/mL / 80 U/L / x     / 1.1 ng/mL  CARDIAC MARKERS ( 05 Mar 2018 18:00 )  x     / <0.01 ng/mL / 90 U/L / x     / 1.4 ng/mL    < from: Transthoracic Echocardiogram (18 @ 12:32) >    Patient name: FATOUMATA SOLER  YOB: 1970   Age: 47 (F)   MR#: [id number]  Study Date: 3/6/2018  [facility]  Study quality: Technically good  Referring Physician: Saji Nichole MD  Blood Pressure: 92/50 mmHg  Height: 175 cm  Weight: 78 kg  BSA: 1.9 m2  ------------------------------------------------------------------------  PROCEDURE: Transthoracic echocardiogram with 2-D, M-Mode  and complete spectral and color flow Doppler.  INDICATION: Chest pain, unspecified (R07.9)  ------------------------------------------------------------------------  Dimensions:    Normal Values:  LA:     2.7    2.0 - 4.0 cm  Ao:     2.7    2.0 - 3.8 cm  SEPTUM: 0.7    0.6 - 1.2 cm  PWT:    0.8    0.6 - 1.1 cm  LVIDd:  4.4    3.0 - 5.6 cm  LVIDs:  2.3    1.8 - 4.0 cm  Derived variables:  LVMI: 52 g/m2  RWT: 0.36  Fractional short: 48 %  EF (Teicholtz): 79 %  ------------------------------------------------------------------------  Observations:  Mitral Valve: Normal mitral valve.  Aortic Valve/Aorta: Normal trileaflet aortic valve. Minimal  aortic regurgitation.  Aortic Root: 2.7 cm.  Left Atrium: Normal left atrium.  LA volume index = 21  cc/m2.  Left Ventricle: Hyperdynamic left ventricularsystolic  function. Normal left ventricular internal dimensions and  wall thicknesses. Normal diastolic function  Right Heart: Normal right atrium. Normal right ventricular  size and function. Normal tricuspid valve. Minimal  tricuspid regurgitation.Normal pulmonic valve. Minimal  pulmonic regurgitation.  Pericardium/Pleura: Normal pericardium with no pericardial  effusion.  Hemodynamic: Estimated right atrial pressure is 8 mm Hg.  Estimated right ventricular systolic pressure equals 26 mm  Hg, assuming right atrial pressure equals 8 mm Hg,  consistent with normal pulmonary pressures.  ------------------------------------------------------------------------  Conclusions:  1. Normal left ventricular internal dimensions and wall  thicknesses.  2. Hyperdynamic left ventricular systolic function.  *** No previous Echo exam.  ------------------------------------------------------------------------  Confirmed on  3/6/2018 - 14:41:23 by Konstantin Patino M.D.  ------------------------------------------------------------------------    < end of copied text >  ---------------------------------------------------------------------------------------------------------        MICROBIOLOGY:           RADIOLOGY:  [x ] Chest radiographs reviewed and interpreted by me    < from: Xray Chest 2 Views PA/Lat (18 @ 19:02) >    EXAM:  XR CHEST PA LAT 2V                            PROCEDURE DATE:  2018        INTERPRETATION:  CLINICAL INDICATION: Shortness of breath.    TECHNIQUE: Frontal and lateral views of the chest    COMPARISON: CT the abdomen pelvis from 2011.    FINDINGS:     There is a right middle lobe opacity due to a pectus excavatum.  There is no pleural effusion or pneumothorax.  The heart size is normal.  There is no acute osseous abnormality.    IMPRESSION:    No acute disease. Pectus excavatum.      KAREN LEE M.D., RADIOLOGY RESIDENT  This document has been electronically signed.  JAVIER VASQUEZ M.D., ATTENDING RADIOLOGIST  This document has been electronically signed. Mar  6 2018  9:03AM      < end of copied text >  ---------------------------------------------------------------------------------------------------------    < from: CT Angio Chest w/ IV Cont (18 @ 19:25) >    EXAM:  CT ANGIO CHEST (W)AW IC                            PROCEDURE DATE:  2018      INTERPRETATION:  CLINICAL INFORMATION: Dyspnea. Evaluate for PE.    COMPARISON: No similar available comparisons.    PROCEDURE:   CT Angiography of theChest.  90 ml of Omnipaque 350 was injected intravenously. 10 ml were discarded.  Sagittal and coronal reformats were performed as well as 3D (MIP)   reconstructions.    FINDINGS: Slightly motion degraded study.    CHEST:     LUNGS AND LARGE AIRWAYS:Patent central airways. No pulmonary nodules.  PLEURA: No pleural effusion.  VESSELS: No pulmonary artery embolism.  HEART: Heart size is normal. No pericardial effusion.  MEDIASTINUM AND VINH: No lymphadenopathy.  CHEST WALL AND LOWER NECK: Pectus excavatum deformity.  VISUALIZED UPPER ABDOMEN: Within normal limits.  BONES: Within normal limits.    IMPRESSION:     No pulmonary embolism. Clear lungs.      ELVIS HOOKER M.D., RADIOLOGY RESIDENT  This document has been electronically signed.  GOLDEN CANCINO M.D., ATTENDING RADIOLOGIST  This document has been electronically signed. Mar  5 2018  8:23PM      < end of copied text >  ---------------------------------------------------------------------------------------------------------    < from: CT Heart with Coronaries (18 @ 11:11) >    EXAM:  CT HEART WITH CORONARIES                            PROCEDURE DATE:  2018        INTERPRETATION:  CLINICAL INDICATION: Chest pain, bradycardia.    Procedure: Informed consent was obtained from the patient and there were   no complications during the procedure. ECG gated CT of the heart was   initially performed without contrast administration. An ECG gated   coronary CT angiogram following administration of 60 cc's of Omnipaque   350. The patient was pretreated with 0 mg of IV metoprolol and 0.4 mg of   sublingual nitroglycerin; resting heart rate at time of the examination   was 37 beats/min. Axial two dimensional and three-dimensional images were   reconstructed using an analysis workstation.     FINDINGS:    Non-Coronary:    There is no pericardial effusion. Evaluation of the visualized portions   of the lungs demonstrate no significant abnormality. There is pectus   excavatum deformity of the chest wall with a Gemma index of about 5 as   correlated with the recently performed chest CT of 2018.      Coronary:    The left main coronary artery is normal. The left anterior descending   artery is patent. The left circumflex artery is patent. The right   coronary artery, the dominant vessel is patent.    Coronary Calcium Score = 0 Agatston Units.    IMPRESSION: Pectus excavatum deformity of the chest wall as above.    CT coronary angiography shows:  LMCA: Normal.  LAD: Patent.  LCx: Patent.  RCA: Dominant vessel, patent.    MICHELE ACUNA M.D. ATTENDING RADIOLOGIST  This document has been electronically signed. 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 She has been treated with Acebutolol for persistent tachycardia after the ablation. The patient describes intermittent left upper and lower extremity weakness over the last several years associated with neck pain typically when doing weight lifting or push-ups. Over the last several days, the patient has noticed shortness of breath exacerbated with talking or exertion associated with pressure-like chest discomfort radiating to the left arm. She has "ruled out" for ACS. CT of the coronary arteries is normal. ECHO is normal. CTA of the chest is without pulmonary emboli or intrinsic lung disease - there is a significant pectus excavatum deformity. The patient has been taken off Acebutolol due to severe bradycardia but she continues to have ongoing symptoms. She has no fever, chills or sweats. No palpitations. No cough, sputum production, chest congestion or wheeze.     PMHX:  Migraine  SVT (Supraventricular Tachycardia)/Ablation      PSHX:   delivery delivered      FAMILY HISTORY:  no signifcant medical history in first degree relatives      SOCIAL HISTORY:  non-smoker    Pulmonary Medications:       Antimicrobials:      Cardiology:      Other:  artificial  tears Solution 1 Drop(s) Both EYES every 8 hours PRN  escitalopram 10 milliGRAM(s) Oral daily  fluticasone propionate 50 MICROgram(s)/spray Nasal Spray 1 Spray(s) Both Nostrils daily  heparin  Injectable 5000 Unit(s) SubCutaneous every 12 hours  LORazepam     Tablet 1 milliGRAM(s) Oral daily PRN  multivitamin 1 Tablet(s) Oral daily      Allergies    bacitracin (Rash)      HOME MEDICATIONS: see  H & P    REVIEW OF SYSTEMS:  Constitutional: As per HPI  HEENT: Within normal limits  CV: As per HPI  Resp: As per HPI  GI: Within normal limits   : Within normal limits  Musculoskeletal: Within normal limits  Skin: Within normal limits  Neurological: As per HPI  Psychiatric: Within normal limits  Endocrine: Within normal limits  Hematologic/Lymphatic: Within normal limits  Allergic/Immunologic: Within normal limits    OBJECTIVE:      I&O's Detail    07 Mar 2018 07:01  -  08 Mar 2018 07:00  --------------------------------------------------------  IN:    Oral Fluid: 960 mL  Total IN: 960 mL    OUT:  Total OUT: 0 mL    Total NET: 960 mL      08 Mar 2018 07:01  -  08 Mar 2018 12:17  --------------------------------------------------------  IN:    Oral Fluid: 240 mL  Total IN: 240 mL    OUT:  Total OUT: 0 mL    Total NET: 240 mL    PHYSICAL EXAM:  ICU Vital Signs Last 24 Hrs  T(C): 36.7 (08 Mar 2018 05:01), Max: 36.8 (07 Mar 2018 21:26)  T(F): 98.1 (08 Mar 2018 05:01), Max: 98.2 (07 Mar 2018 21:26)  HR: 55 (08 Mar 2018 07:16) (41 - 55)  BP: 111/71 (08 Mar 2018 07:16) (100/49 - 115/61)  BP(mean): --  ABP: --  ABP(mean): --  RR: 18 (08 Mar 2018 07:16) (16 - 18)  SpO2: 97% (08 Mar 2018 07:16) (97% - 98%) on room air    General: Awake. Alert. Cooperative. No distress. Appears stated age 	  HEENT:   Atraumatic. Normocephalic. Anicteric. Normal oral mucosa, PERRL, EOMI  Neck: Supple. Trachea midline. Thyroid without enlargement/tenderness/nodules. No carotid bruit. No JVD	  Cardiovascular: Bradycardic rate and rhythm. S1 S2 normal. No murmurs, rubs or gallops  Respiratory: Respirations unlabored. Clear to auscultation and percussion bilaterally. Significant pectus excavatum deformity.  Abdomen: Soft. Non-tender. Non-distended. No organomegaly. No masses. Normal bowel sounds	  Extremities: Warm to touch. No clubbing or cyanosis. No pedal edema.  Pulses: 2+ peripheral pulses all extremities	  Skin: Normal skin color. No rashes or lesions. No ecchymoses, No cyanosis. Warm to touch  Lymph Nodes: Cervical, supraclavicular and axillary nodes normal  Neurological: Motor and sensory examination equal and normal. A and O x 3  Psychiatry: Appropriate mood and affect.      LABS:    Complete Blood Count + Automated Diff (18 @ 10:45)    WBC Count: 4.6 K/uL    RBC Count: 4.20 M/uL    Hemoglobin: 13.5 g/dL    Hematocrit: 39.2 %    Mean Cell Volume: 93.1 fl    Mean Cell Hemoglobin: 32.2 pg    Mean Cell Hemoglobin Conc: 34.6 gm/dL    Red Cell Distrib Width: 12.0 %    Platelet Count - Automated: 188 K/uL    Auto Neutrophil #: 2.7 K/uL    Auto Lymphocyte #: 1.2 K/uL    Auto Monocyte #: 0.6 K/uL    Auto Eosinophil #: 0.1 K/uL    Auto Basophil #: 0.0 K/uL    Auto Neutrophil %: 58.5: Differential percentages must be correlated with absolute numbers for  clinical significance. %    Auto Lymphocyte %: 26.7 %    Auto Monocyte %: 12.3 %    Auto Eosinophil %: 1.7 %    Auto Basophil %: 0.8 %        141  |  106  |  14  ----------------------------<  72  4.1   |  27  |  0.99    03-06    141  |  104  |  15  ----------------------------<  119<H>  4.1   |  26  |  0.99    Ca      8.9      03-07    Ca      9.1      03-06    Phos    3.2     03-06      Mg       2.0     03-06    TPro  7.6  /  Alb  3.8  /  TBili  0.4  /  DBili  x   /  AST  22  /  ALT  11  /  AlkPhos  74  03-05    Serum Pro-Brain Natriuretic Peptide: 158 pg/mL ( @ 18:00)    D-Dimer Assay, Quantitative: 338 ng/mL DDU ( @ 18:00)    CARDIAC MARKERS ( 06 Mar 2018 10:45 )  x     / <0.01 ng/mL / 78 U/L / x     / 1.0 ng/mL  CARDIAC MARKERS ( 06 Mar 2018 02:29 )  x     / <0.01 ng/mL / 80 U/L / x     / 1.1 ng/mL  CARDIAC MARKERS ( 05 Mar 2018 18:00 )  x     / <0.01 ng/mL / 90 U/L / x     / 1.4 ng/mL    < from: Transthoracic Echocardiogram (18 @ 12:32) >    Patient name: FATOUMATA SOLER  YOB: 1970   Age: 47 (F)   MR#: [id number]  Study Date: 3/6/2018  [facility]  Study quality: Technically good  Referring Physician: Saji Nichole MD  Blood Pressure: 92/50 mmHg  Height: 175 cm  Weight: 78 kg  BSA: 1.9 m2  ------------------------------------------------------------------------  PROCEDURE: Transthoracic echocardiogram with 2-D, M-Mode  and complete spectral and color flow Doppler.  INDICATION: Chest pain, unspecified (R07.9)  ------------------------------------------------------------------------  Dimensions:    Normal Values:  LA:     2.7    2.0 - 4.0 cm  Ao:     2.7    2.0 - 3.8 cm  SEPTUM: 0.7    0.6 - 1.2 cm  PWT:    0.8    0.6 - 1.1 cm  LVIDd:  4.4    3.0 - 5.6 cm  LVIDs:  2.3    1.8 - 4.0 cm  Derived variables:  LVMI: 52 g/m2  RWT: 0.36  Fractional short: 48 %  EF (Lacytz): 79 %  ------------------------------------------------------------------------  Observations:  Mitral Valve: Normal mitral valve.  Aortic Valve/Aorta: Normal trileaflet aortic valve. Minimal  aortic regurgitation.  Aortic Root: 2.7 cm.  Left Atrium: Normal left atrium.  LA volume index = 21  cc/m2.  Left Ventricle: Hyperdynamic left ventricularsystolic  function. Normal left ventricular internal dimensions and  wall thicknesses. Normal diastolic function  Right Heart: Normal right atrium. Normal right ventricular  size and function. Normal tricuspid valve. Minimal  tricuspid regurgitation. Normal pulmonic valve. Minimal  pulmonic regurgitation.  Pericardium/Pleura: Normal pericardium with no pericardial  effusion.  Hemodynamic: Estimated right atrial pressure is 8 mm Hg.  Estimated right ventricular systolic pressure equals 26 mm  Hg, assuming right atrial pressure equals 8 mm Hg,  consistent with normal pulmonary pressures.  ------------------------------------------------------------------------  Conclusions:  1. Normal left ventricular internal dimensions and wall  thicknesses.  2. Hyperdynamic left ventricular systolic function.  *** No previous Echo exam.  ------------------------------------------------------------------------  Confirmed on  3/6/2018 - 14:41:23 by Konstantin Patino M.D.  ------------------------------------------------------------------------    < end of copied text >  ---------------------------------------------------------------------------------------------------------        MICROBIOLOGY:         RADIOLOGY:  [x ] Chest radiographs reviewed and interpreted by me    < from: Xray Chest 2 Views PA/Lat (18 @ 19:02) >    EXAM:  XR CHEST PA LAT 2V                            PROCEDURE DATE:  2018        INTERPRETATION:  CLINICAL INDICATION: Shortness of breath.    TECHNIQUE: Frontal and lateral views of the chest    COMPARISON: CT the abdomen pelvis from 2011.    FINDINGS:     There is a right middle lobe opacity due to a pectus excavatum.  There is no pleural effusion or pneumothorax.  The heart size is normal.  There is no acute osseous abnormality.    IMPRESSION:    No acute disease. Pectus excavatum.      KAREN LEE M.D., RADIOLOGY RESIDENT  This document has been electronically signed.  JAVIER VASQUEZ M.D., ATTENDING RADIOLOGIST  This document has been electronically signed. Mar  6 2018  9:03AM      < end of copied text >  ---------------------------------------------------------------------------------------------------------    < from: CT Angio Chest w/ IV Cont (18 @ 19:25) >    EXAM:  CT ANGIO CHEST (W)AW IC                            PROCEDURE DATE:  2018      INTERPRETATION:  CLINICAL INFORMATION: Dyspnea. Evaluate for PE.    COMPARISON: No similar available comparisons.    PROCEDURE:   CT Angiography of theChest.  90 ml of Omnipaque 350 was injected intravenously. 10 ml were discarded.  Sagittal and coronal reformats were performed as well as 3D (MIP)   reconstructions.    FINDINGS: Slightly motion degraded study.    CHEST:     LUNGS AND LARGE AIRWAYS: Patent central airways. No pulmonary nodules.  PLEURA: No pleural effusion.  VESSELS: No pulmonary artery embolism.  HEART: Heart size is normal. No pericardial effusion.  MEDIASTINUM AND VINH: No lymphadenopathy.  CHEST WALL AND LOWER NECK: Pectus excavatum deformity.  VISUALIZED UPPER ABDOMEN: Within normal limits.  BONES: Within normal limits.    IMPRESSION:     No pulmonary embolism. Clear lungs.      ELVIS HOOKER M.D., RADIOLOGY RESIDENT  This document has been electronically signed.  GOLDEN CANCINO M.D., ATTENDING RADIOLOGIST  This document has been electronically signed. Mar  5 2018  8:23PM      < end of copied text >  ---------------------------------------------------------------------------------------------------------    < from: CT Heart with Coronaries (18 @ 11:11) >    EXAM:  CT HEART WITH CORONARIES                            PROCEDURE DATE:  2018        INTERPRETATION:  CLINICAL INDICATION: Chest pain, bradycardia.    Procedure: Informed consent was obtained from the patient and there were   no complications during the procedure. ECG gated CT of the heart was   initially performed without contrast administration. An ECG gated   coronary CT angiogram following administration of 60 cc's of Omnipaque   350. The patient was pretreated with 0 mg of IV metoprolol and 0.4 mg of   sublingual nitroglycerin; resting heart rate at time of the examination   was 37 beats/min. Axial two dimensional and three-dimensional images were   reconstructed using an analysis workstation.     FINDINGS:    Non-Coronary:    There is no pericardial effusion. Evaluation of the visualized portions   of the lungs demonstrate no significant abnormality. There is pectus   excavatum deformity of the chest wall with a Gemma index of about 5 as   correlated with the recently performed chest CT of 2018.      Coronary:    The left main coronary artery is normal. The left anterior descending   artery is patent. The left circumflex artery is patent. The right   coronary artery, the dominant vessel is patent.    Coronary Calcium Score = 0 Agatston Units.    IMPRESSION: Pectus excavatum deformity of the chest wall as above.    CT coronary angiography shows:  LMCA: Normal.  LAD: Patent.  LCx: Patent.  RCA: Dominant vessel, patent.    MICHELE ACUNA M.D. ATTENDING RADIOLOGIST  This document has been electronically signed. Mar  7 2018 11:39AM      < end of copied text >  ---------------------------------------------------------------------------------------------------------

## 2018-03-08 NOTE — DISCHARGE NOTE ADULT - CARE PLAN
Principal Discharge DX:	Chest pain, atypical  Goal:	Pt remains chest pain free and understands discharge instructions  Assessment and plan of treatment:	Low salt, low fat diet.   Weight management.   Take medications as prescribed.   No smoking.  Follow up appointments with your doctor(s)  as instructed.  Secondary Diagnosis:	SVT (supraventricular tachycardia)  Goal:	Your heart rate and rhythm will be controlled.  Assessment and plan of treatment:	Continue with your cardiologist and primary care MD. Continue your current medications. Call your physician for palpitations, feelings of rapid heart beat, lightheadedness, or dizziness.  Secondary Diagnosis:	Anxiety  Goal:	Your anxiety will be controlled  Assessment and plan of treatment:	Continue taking your antianxiety medications as prescribed. Follow up with your outpatient provider.

## 2018-03-08 NOTE — DISCHARGE NOTE ADULT - NS AS ACTIVITY OBS
Showering allowed/Driving allowed/Walking-Indoors allowed/Return to Work/School allowed/Walking-Outdoors allowed

## 2018-03-08 NOTE — CONSULT NOTE ADULT - SUBJECTIVE AND OBJECTIVE BOX
Patient is a 47y old  Female who presents with a chief complaint of chest pain and sob (08 Mar 2018 00:20)    HPI:  47 f h/o SVT s/p ablation, h/o migraine complains of LUE/LE weakness. She states that for several years she has had intermittent weakness of Larm and leg, typically when doing weightlifting. It is associated with neck pain radiating to her chest and LUE, worse with head mvt to R, improved with mvt to L. She was admitted with increased severe  CP, described as heaviness with sob, and had a recurrence of weakness since sx are intermittent. no bowel/bladder incontinence. also notes intermittent paresthesia of L hand. no ha, no visual complaints, no speech difficulties    PAST MEDICAL & SURGICAL HISTORY:  Migraine: with menstrual cycle  SVT (Supraventricular Tachycardia)   delivery delivered: 16 years ago    FAMILY HISTORY: No history of neurological disorders    Social Hx:  Nonsmoker, no drug or alcohol use  MEDICATIONS  (STANDING):  escitalopram 10 milliGRAM(s) Oral daily  fluticasone propionate 50 MICROgram(s)/spray Nasal Spray 1 Spray(s) Both Nostrils daily  heparin  Injectable 5000 Unit(s) SubCutaneous every 12 hours  multivitamin 1 Tablet(s) Oral daily    MEDICATIONS  (PRN):  artificial  tears Solution 1 Drop(s) Both EYES every 8 hours PRN Dry Eyes  LORazepam     Tablet 1 milliGRAM(s) Oral daily PRN Anxiety    Allergies    bacitracin (Rash)    Intolerances      ROS: all other ROS were reviewed and are negative.    Vital Signs Last 24 Hrs  T(C): 36.7 (08 Mar 2018 05:01), Max: 36.8 (07 Mar 2018 21:26)  T(F): 98.1 (08 Mar 2018 05:01), Max: 98.2 (07 Mar 2018 21:26)  HR: 52 (08 Mar 2018 06:20) (41 - 55)  BP: 100/49 (08 Mar 2018 06:20) (100/49 - 115/61)  BP(mean): --  RR: 17 (08 Mar 2018 06:20) (16 - 17)  SpO2: 98% (08 Mar 2018 06:20) (98% - 98%)  GENERAL EXAM:  Constitutional: awake and alert. NAD  HEENT: PERRLA, EOMI  Neck: Supple  Respiratory: Breath sounds are clear bilaterally  Cardiovascular: S1 and S2, regular rhythm  Gastrointestinal: soft, nontender  Extremities: no edema, no cyanosis  Musculoskeletal: no joint swelling/tenderness, no abnormal movements  Skin: no rashes  NEUROLOGICAL EXAM:  MS: AAOX3, fluent, attends b/l; recent and remote memory intact; normal attention, language and fund of knowledge.   CN: VFF, EOMI, PERRL, no KARIN, no APD,  V1-3 intact, no facial asymmetry, t/p midline, SCM/trap intact.  Motor: Strength: 5/5 4x. Tone: normal. Bulk: normal. DTR 2+ symm.  Plantar flex b/l. Sensation: intact to LT/PP/Vibration/Position/Temperature 4x.   Coordination: intact 4x.   Gait: deferred as pt is on monitor    Labs:         141  |  106  |  14  ----------------------------<  72  4.1   |  27  |  0.99    Ca    8.9      07 Mar 2018 09:48                CAPILLARY BLOOD GLUCOSE            Radiology:  -CT Head:  -MRI brain  -MRA brain/Carotids  -EEG

## 2018-03-08 NOTE — CONSULT NOTE ADULT - PROBLEM SELECTOR RECOMMENDATION 9
Continue with telemetry monitoring  Continue to hold all AV karina blockers. Continue with telemetry monitoring  Continue to hold all AV karina blockers.  Case discussed with Dr. Guthrie plan for loop recorder implant on 3/9 Tele: SB, 40- 50's,  Ambulated pt to assess for chronotropic incompetence- HR seymour to 80s during ambulation however developed SOB. (Pulmonary work-up)  Continue with telemetry monitoring  Continue to hold all AV karina blockers.  Case discussed with Dr. Guthrie plan for loop recorder implant on 3/9 Tele: SB, 40- 50's,  Ambulated pt to assess for chronotropic incompetence- HR seymour to 80s during ambulation however developed SOB.   Continue with telemetry monitoring  Continue to hold all AV karina blockers.  Case discussed with Dr. Guthrie plan for loop recorder implant on 3/9

## 2018-03-08 NOTE — CONSULT NOTE ADULT - PROBLEM SELECTOR RECOMMENDATION 3
f/U MRI results  Being folllowed by Neuro team. f/U MRI results  Being followed by Neuro team. F/U MRI results  Being followed by Neuro team.

## 2018-03-08 NOTE — DISCHARGE NOTE ADULT - PLAN OF CARE
Pt remains chest pain free and understands discharge instructions Low salt, low fat diet.   Weight management.   Take medications as prescribed.   No smoking.  Follow up appointments with your doctor(s)  as instructed. Your heart rate and rhythm will be controlled. Continue with your cardiologist and primary care MD. Continue your current medications. Call your physician for palpitations, feelings of rapid heart beat, lightheadedness, or dizziness. Your anxiety will be controlled Continue taking your antianxiety medications as prescribed. Follow up with your outpatient provider.

## 2018-03-08 NOTE — PROVIDER CONTACT NOTE (OTHER) - ASSESSMENT
Pt is alert and oriented x4. vss. pt denies any chest pain/sob.
A&Ox4, VSS, asymptomatic
Pt A+Ox4. Denies CP/SOB. HR went from 26 to 52, /49, O2 sat 98 on room air,

## 2018-03-08 NOTE — DISCHARGE NOTE ADULT - MEDICATION SUMMARY - MEDICATIONS TO TAKE
I will START or STAY ON the medications listed below when I get home from the hospital:    acebutolol 200 mg oral capsule  -- 1 cap(s) by mouth once a day  -- Indication: For history of SVT (supraventricular tachycardia)    LORazepam 1 mg oral tablet  -- 1 tab(s) by mouth once a day, As Needed  -- Indication: For Anxiety    escitalopram 10 mg oral tablet  -- 1 tab(s) by mouth once a day, As Needed  -- Indication: For Anxiety    Flonase 50 mcg/inh nasal spray  -- 1 spray(s) in each nostril , As Needed  -- Indication: For nasal congestion    Fish Oil oral capsule  -- 2 cap(s) by mouth once a day  -- Indication: For Supplement    Refresh ophthalmic solution  -- 1 drop(s) in each eye , As Needed  -- Indication: For Dry eyes    Multiple Vitamins oral tablet  -- 1 tab(s) by mouth once a day  -- Indication: For vitamin supplementation I will START or STAY ON the medications listed below when I get home from the hospital:    aspirin 81 mg oral tablet, chewable  -- 1 tab(s) by mouth once a day  -- Indication: For preventative measure     LORazepam 1 mg oral tablet  -- 1 tab(s) by mouth once a day, As Needed  -- Indication: For Anxiety    escitalopram 10 mg oral tablet  -- 1 tab(s) by mouth once a day, As Needed  -- Indication: For Anxiety    Flonase 50 mcg/inh nasal spray  -- 1 spray(s) in each nostril , As Needed  -- Indication: For nasal congestion    Fish Oil oral capsule  -- 2 cap(s) by mouth once a day  -- Indication: For Supplement    Refresh ophthalmic solution  -- 1 drop(s) in each eye , As Needed  -- Indication: For Dry eyes    Multiple Vitamins oral tablet  -- 1 tab(s) by mouth once a day  -- Indication: For vitamin supplementation

## 2018-03-08 NOTE — DISCHARGE NOTE ADULT - MEDICATION SUMMARY - MEDICATIONS TO STOP TAKING
I will STOP taking the medications listed below when I get home from the hospital:  None I will STOP taking the medications listed below when I get home from the hospital:    acebutolol 200 mg oral capsule  -- 1 cap(s) by mouth once a day

## 2018-03-08 NOTE — DISCHARGE NOTE ADULT - HOSPITAL COURSE
HPI:  47 f h/o SVT s/p ablation, h/o migraine a/w worsening sob x 24 hours and left sided cp radiating to left arm.  Pt states history began back 12/07 with similar sob/cp.  Went to OSH and told MVP/stress by hospital and outpt cards until similar episode 2012.  Saw Dr. Orta, patrick, and diagnosed with SVT.  s/p ablation with improved HR but occasionally would peak at 180s with exercise to started acebutolol.  HR then went to 40s and pt states this has been her baseline HR since 2012.  Pt states in last 6 years occasional, brief episodes of similar sob/cp but resolve quickly and pt has never sought medical attention.    Pt now states she developed the sob/cp approximately 24 hrs ago suddenly but this episode is not abating.  States symptoms become worse while talking and with any form of exertion.  Pt states cp starts at shoulder and spreads down across L.thorax into left abdomen and down to left leg.  She describes it as piercing and sharp.    She saw Dr. Orta this afternoon who sent pt to ED after she tried to walk and stated she was unable 2nd sensation of "darkness" and unsteadiness though denies light-headedness.  Pt states she had another brief episode of this accompanied by nausea last week when she started a new vitamin.  After symptoms developed she read label and realized it contained caffeine.  Pt has not taken this med again and symptoms that day resolved quickly.  No f/c.  Nl appetite.  No sick contacts.  No uri symptoms.  No gu/gi symptoms.  No s/s bleeding.      In ED reviewed tele with tech and hr fluctuant between high 30s and high 40s.  Given ASA 325mg and flu shot. (05 Mar 2018 20:47)    3/5 chest CT negative  3/7 heart CT normal HPI:  47 f h/o SVT s/p ablation, h/o migraine a/w worsening sob x 24 hours and left sided cp radiating to left arm.  Pt states history began back 12/07 with similar sob/cp.  Went to OSH and told MVP/stress by hospital and outpt cards until similar episode 2012.  Saw Dr. Orta, patrick, and diagnosed with SVT.  s/p ablation with improved HR but occasionally would peak at 180s with exercise to started acebutolol.  HR then went to 40s and pt states this has been her baseline HR since 2012.  Pt states in last 6 years occasional, brief episodes of similar sob/cp but resolve quickly and pt has never sought medical attention.    Pt now states she developed the sob/cp approximately 24 hrs ago suddenly but this episode is not abating.  States symptoms become worse while talking and with any form of exertion.  Pt states cp starts at shoulder and spreads down across L.thorax into left abdomen and down to left leg.  She describes it as piercing and sharp.    She saw Dr. Orta this afternoon who sent pt to ED after she tried to walk and stated she was unable 2nd sensation of "darkness" and unsteadiness though denies light-headedness.  Pt states she had another brief episode of this accompanied by nausea last week when she started a new vitamin.  After symptoms developed she read label and realized it contained caffeine.  Pt has not taken this med again and symptoms that day resolved quickly.  No f/c.  Nl appetite.  No sick contacts.  No uri symptoms.  No gu/gi symptoms.  No s/s bleeding.      In ED reviewed tele with tech and hr fluctuant between high 30s and high 40s.  Given ASA 325mg and flu shot. (05 Mar 2018 20:47)    3/5 chest CT negative  3/7 heart CT normal  3/9 loop recorder implantation by Dr. ANNETTE Guhtrie

## 2018-03-08 NOTE — DISCHARGE NOTE ADULT - PATIENT PORTAL LINK FT
You can access the [facility] Portal, offered by [facility], by registering with the following website: [email or web address]

## 2018-03-08 NOTE — PROVIDER CONTACT NOTE (OTHER) - ACTION/TREATMENT ORDERED:
none at this time.
NP assessed tele. Will continue to monitor.
cont to monitor pt very closely

## 2018-03-08 NOTE — CONSULT NOTE ADULT - SUBJECTIVE AND OBJECTIVE BOX
HPI:    Patient is a 47 year old female with past medical history SVT s/p ablation , on acebutolol post ablation and  migraine headaches  Referred to ED by Dr. Orta (Cardiologist )for complaint of worsening SOB over the last several days and left sided chest pain and numbness radiating to left arm. EKG done in office found heart rate down in the 30's. (States her usual baseline HR 40's)  Pt reports taking last taking Acebutolol last on 3/5, in which she took 2 tabs for thinking her HR was elevated.  Denies any smoking, ETOH use or illicit drug use.     Cardiac enzymes x 3 negative. ECHO  WNL.  CTA of the chest is without pulmonary emboli or intrinsic lung disease, noted to have significant pectus excavatum deformity. Lab work WNL. EP consulted for sinus bradycardia. Currently reports + SHEETS , however denies any lightheadedness, dizziness chest pain, palpitations, nausea, vomiting or abdominal pains.           PAST MEDICAL & SURGICAL HISTORY:  Migraine: with menstrual cycle  SVT (Supraventricular Tachycardia)   delivery delivered: 16 years ago      ROS:  General: Pt denies recent weight loss/fever/chills  Neurological: denies numbness or  sensation loss  Cardiovascular: denies chest pain/palpitations/leg edema  Respiratory and Thorax: + SHEETS/ SOB  Gastrointestinal: denies abdominal pain/diarrhea/constipation/bloody stool  Genitourinary: denies urinary frequency/urgency/ dysuria  Musculoskeletal: denies joint pain or swelling, denies restricted motion  Hematologic: denies abnormal bleeding  	    MEDICATIONS  (STANDING):  escitalopram 10 milliGRAM(s) Oral daily  fluticasone propionate 50 MICROgram(s)/spray Nasal Spray 1 Spray(s) Both Nostrils daily  heparin  Injectable 5000 Unit(s) SubCutaneous every 12 hours  multivitamin 1 Tablet(s) Oral daily    MEDICATIONS  (PRN):  artificial  tears Solution 1 Drop(s) Both EYES every 8 hours PRN Dry Eyes  LORazepam     Tablet 1 milliGRAM(s) Oral daily PRN Anxiety      Allergies    bacitracin (Rash)    Intolerances        SOCIAL HISTORY:    FAMILY HISTORY:      Vital Signs Last 24 Hrs  T(C): 36.7 (08 Mar 2018 05:01), Max: 36.8 (07 Mar 2018 21:26)  T(F): 98.1 (08 Mar 2018 05:01), Max: 98.2 (07 Mar 2018 21:26)  HR: 55 (08 Mar 2018 07:16) (41 - 55)  BP: 111/71 (08 Mar 2018 07:16) (100/49 - 115/61)  BP(mean): --  RR: 18 (08 Mar 2018 07:16) (16 - 18)  SpO2: 97% (08 Mar 2018 07:16) (97% - 98%)    Physical Exam:  Neurological: Alert & Oriented x 3  Respiratory: CTA B/L, No wheezing/crackles/rhonchi  Cardiovascular: (+) S1 & S2  Gastrointestinal: soft, NT, nondistended, (+) BS  Extremities: No pedal edema, No clubbing, No cyanosis  Skin:  normal skin color and pigmentation, no skin lesions      LABS:                        13.8   5.0   )-----------( 192      ( 08 Mar 2018 12:25 )             41.3     03-08    140  |  105  |  13  ----------------------------<  100<H>  4.3   |  24  |  0.91    Ca    8.9      08 Mar 2018 12:25  Phos  3.0     03-08  Mg     1.9     03-08            RADIOLOGY & ADDITIONAL STUDIES:  < from: Transthoracic Echocardiogram (18 @ 12:32) >  Dimensions:    Normal Values:  LA:     2.7    2.0 - 4.0 cm  Ao:     2.7    2.0 - 3.8 cm  SEPTUM: 0.7    0.6 - 1.2 cm  PWT:    0.8    0.6 - 1.1 cm  LVIDd:  4.4    3.0 - 5.6 cm  LVIDs:  2.3    1.8 - 4.0 cm  Derived variables:  LVMI: 52 g/m2  RWT: 0.36  Fractional short: 48 %  EF (Valenteicholtz): 79 %  ------------------------------------------------------------------------  Observations:  Mitral Valve: Normal mitral valve.  Aortic Valve/Aorta: Normal trileaflet aortic valve. Minimal  aortic regurgitation.  Aortic Root: 2.7 cm.  Left Atrium: Normal left atrium.  LA volume index = 21  cc/m2.  Left Ventricle: Hyperdynamic left ventricularsystolic  function. Normal left ventricular internal dimensions and  wall thicknesses. Normal diastolic function  Right Heart: Normal right atrium. Normal right ventricular  size and function. Normal tricuspid valve. Minimal  tricuspid regurgitation.Normal pulmonic valve. Minimal  pulmonic regurgitation.  Pericardium/Pleura: Normal pericardium with no pericardial  effusion.  Hemodynamic: Estimated right atrial pressure is 8 mm Hg.  Estimated right ventricular systolic pressure equals 26 mm  Hg, assuming right atrial pressure equals 8 mm Hg,  consistent with normal pulmonary pressures.  ------------------------------------------------------------------------  Conclusions:  1. Normal left ventricular internal dimensions and wall  thicknesses.  2. Hyperdynamic left ventricular systolic function.  *** No previous Echo exam.    < end of copied text >  < from: CT Heart with Coronaries (18 @ 11:11) >  Non-Coronary:    There is no pericardial effusion. Evaluation of the visualized portions   of the lungs demonstrate no significant abnormality. There is pectus   excavatum deformity of the chest wall with a Gemma index of about 5 as   correlated with the recently performed chest CT of 2018.      Coronary:    The left main coronary artery is normal. The left anterior descending   artery is patent. The left circumflex artery is patent. The right   coronary artery, the dominant vessel is patent.    Coronary Calcium Score = 0 Agatston Units.    IMPRESSION: Pectus excavatum deformity of the chest wall as above.    CT coronary angiography shows:  LMCA: Normal.  LAD: Patent.  LCx: Patent.  RCA: Dominant vessel, patent.          < end of copied text >      TELE: HPI:    Patient is a 47 year old female with past medical history SVT s/p ablation , on acebutolol post ablation and  migraine headaches  Referred to ED by Dr. Orta (Cardiologist )for complaint of worsening SOB over the last several days and left sided chest pain and numbness radiating to left arm. EKG done in office found heart rate down in the 30's. (States her usual baseline HR 40's)  Pt reports taking last taking Acebutolol last on 3/5, in which she took 2 tabs for thinking her HR was elevated.  Denies any smoking, ETOH use or illicit drug use.   Denies any syncope, CAD, DM or HTN.  Cardiac enzymes x 3 negative. ECHO  WNL.  CTA of the chest is without pulmonary emboli or intrinsic lung disease, noted to have significant pectus excavatum deformity. Lab work WNL. EP consulted for sinus bradycardia. Currently reports + SHEETS , however denies any lightheadedness, dizziness chest pain, palpitations, nausea, vomiting or abdominal pains.           PAST MEDICAL & SURGICAL HISTORY:  Migraine: with menstrual cycle  SVT (Supraventricular Tachycardia)   delivery delivered: 16 years ago      ROS:  General: Pt denies recent weight loss/fever/chills  Neurological: denies numbness or  sensation loss  Cardiovascular: denies chest pain/palpitations/leg edema  Respiratory and Thorax: + SHEETS/ SOB  Gastrointestinal: denies abdominal pain/diarrhea/constipation/bloody stool  Genitourinary: denies urinary frequency/urgency/ dysuria  Musculoskeletal: denies joint pain or swelling, denies restricted motion  Hematologic: denies abnormal bleeding  	    MEDICATIONS  (STANDING):  escitalopram 10 milliGRAM(s) Oral daily  fluticasone propionate 50 MICROgram(s)/spray Nasal Spray 1 Spray(s) Both Nostrils daily  heparin  Injectable 5000 Unit(s) SubCutaneous every 12 hours  multivitamin 1 Tablet(s) Oral daily    MEDICATIONS  (PRN):  artificial  tears Solution 1 Drop(s) Both EYES every 8 hours PRN Dry Eyes  LORazepam     Tablet 1 milliGRAM(s) Oral daily PRN Anxiety      Allergies    bacitracin (Rash)    Intolerances        SOCIAL HISTORY:    FAMILY HISTORY:      Vital Signs Last 24 Hrs  T(C): 36.7 (08 Mar 2018 05:01), Max: 36.8 (07 Mar 2018 21:26)  T(F): 98.1 (08 Mar 2018 05:01), Max: 98.2 (07 Mar 2018 21:26)  HR: 55 (08 Mar 2018 07:16) (41 - 55)  BP: 111/71 (08 Mar 2018 07:16) (100/49 - 115/61)  BP(mean): --  RR: 18 (08 Mar 2018 07:16) (16 - 18)  SpO2: 97% (08 Mar 2018 07:16) (97% - 98%)    Physical Exam:  Neurological: Alert & Oriented x 3  Respiratory: CTA B/L, No wheezing/crackles/rhonchi  Cardiovascular: (+) S1 & S2  Gastrointestinal: soft, NT, nondistended, (+) BS  Extremities: No pedal edema, No clubbing, No cyanosis  Skin:  normal skin color and pigmentation, no skin lesions      LABS:                        13.8   5.0   )-----------( 192      ( 08 Mar 2018 12:25 )             41.3     03-08    140  |  105  |  13  ----------------------------<  100<H>  4.3   |  24  |  0.91    Ca    8.9      08 Mar 2018 12:25  Phos  3.0     03-08  Mg     1.9     03-08            RADIOLOGY & ADDITIONAL STUDIES:  < from: Transthoracic Echocardiogram (18 @ 12:32) >  Dimensions:    Normal Values:  LA:     2.7    2.0 - 4.0 cm  Ao:     2.7    2.0 - 3.8 cm  SEPTUM: 0.7    0.6 - 1.2 cm  PWT:    0.8    0.6 - 1.1 cm  LVIDd:  4.4    3.0 - 5.6 cm  LVIDs:  2.3    1.8 - 4.0 cm  Derived variables:  LVMI: 52 g/m2  RWT: 0.36  Fractional short: 48 %  EF (Valenteicholtz): 79 %  ------------------------------------------------------------------------  Observations:  Mitral Valve: Normal mitral valve.  Aortic Valve/Aorta: Normal trileaflet aortic valve. Minimal  aortic regurgitation.  Aortic Root: 2.7 cm.  Left Atrium: Normal left atrium.  LA volume index = 21  cc/m2.  Left Ventricle: Hyperdynamic left ventricularsystolic  function. Normal left ventricular internal dimensions and  wall thicknesses. Normal diastolic function  Right Heart: Normal right atrium. Normal right ventricular  size and function. Normal tricuspid valve. Minimal  tricuspid regurgitation.Normal pulmonic valve. Minimal  pulmonic regurgitation.  Pericardium/Pleura: Normal pericardium with no pericardial  effusion.  Hemodynamic: Estimated right atrial pressure is 8 mm Hg.  Estimated right ventricular systolic pressure equals 26 mm  Hg, assuming right atrial pressure equals 8 mm Hg,  consistent with normal pulmonary pressures.  ------------------------------------------------------------------------  Conclusions:  1. Normal left ventricular internal dimensions and wall  thicknesses.  2. Hyperdynamic left ventricular systolic function.  *** No previous Echo exam.    < end of copied text >  < from: CT Heart with Coronaries (18 @ 11:11) >  Non-Coronary:    There is no pericardial effusion. Evaluation of the visualized portions   of the lungs demonstrate no significant abnormality. There is pectus   excavatum deformity of the chest wall with a Gemma index of about 5 as   correlated with the recently performed chest CT of 2018.      Coronary:    The left main coronary artery is normal. The left anterior descending   artery is patent. The left circumflex artery is patent. The right   coronary artery, the dominant vessel is patent.    Coronary Calcium Score = 0 Agatston Units.    IMPRESSION: Pectus excavatum deformity of the chest wall as above.    CT coronary angiography shows:  LMCA: Normal.  LAD: Patent.  LCx: Patent.  RCA: Dominant vessel, patent.          < end of copied text >      TELE: HPI:    Patient is a 47 year old female with past medical history SVT s/p ablation , on acebutolol post ablation and  migraine headaches  Referred to ED by Dr. Orta (Cardiologist )for complaint of worsening SOB over the last several days and left sided chest pain and numbness radiating to left arm. EKG done in office found heart rate down in the 30's. (States her usual baseline HR 40's)  Pt reports taking last taking Acebutolol last on 3/5, in which she took 2 tabs for thinking her HR was elevated.  Denies any smoking, ETOH use or illicit drug use.   Denies any syncope, CAD, DM or HTN.  Cardiac enzymes x 3 negative. ECHO  WNL.  CTA of the chest is without pulmonary emboli or intrinsic lung disease, noted to have significant pectus excavatum deformity. Lab work WNL. EP consulted for sinus bradycardia. Currently reports + SHEETS during ambulation and during speech, however denies any lightheadedness, dizziness chest pain, palpitations, nausea, vomiting or abdominal pains.           PAST MEDICAL & SURGICAL HISTORY:  Migraine: with menstrual cycle  SVT (Supraventricular Tachycardia)   delivery delivered: 16 years ago      ROS:  General: Pt denies recent weight loss/fever/chills  Neurological: denies numbness or  sensation loss  Cardiovascular: denies chest pain/palpitations/leg edema  Respiratory and Thorax: + SHEETS/ SOB  Gastrointestinal: denies abdominal pain/diarrhea/constipation/bloody stool  Genitourinary: denies urinary frequency/urgency/ dysuria  Musculoskeletal: denies joint pain or swelling, denies restricted motion  Hematologic: denies abnormal bleeding  	    MEDICATIONS  (STANDING):  escitalopram 10 milliGRAM(s) Oral daily  fluticasone propionate 50 MICROgram(s)/spray Nasal Spray 1 Spray(s) Both Nostrils daily  heparin  Injectable 5000 Unit(s) SubCutaneous every 12 hours  multivitamin 1 Tablet(s) Oral daily    MEDICATIONS  (PRN):  artificial  tears Solution 1 Drop(s) Both EYES every 8 hours PRN Dry Eyes  LORazepam     Tablet 1 milliGRAM(s) Oral daily PRN Anxiety      Allergies    bacitracin (Rash)    Intolerances        SOCIAL HISTORY:    FAMILY HISTORY:      Vital Signs Last 24 Hrs  T(C): 36.7 (08 Mar 2018 05:01), Max: 36.8 (07 Mar 2018 21:26)  T(F): 98.1 (08 Mar 2018 05:01), Max: 98.2 (07 Mar 2018 21:26)  HR: 55 (08 Mar 2018 07:16) (41 - 55)  BP: 111/71 (08 Mar 2018 07:16) (100/49 - 115/61)  BP(mean): --  RR: 18 (08 Mar 2018 07:16) (16 - 18)  SpO2: 97% (08 Mar 2018 07:16) (97% - 98%)    Physical Exam:  Neurological: Alert & Oriented x 3  Respiratory: CTA B/L, No wheezing/crackles/rhonchi  Cardiovascular: (+) S1 & S2  Gastrointestinal: soft, NT, nondistended, (+) BS  Extremities: No pedal edema, No clubbing, No cyanosis  Skin:  normal skin color and pigmentation, no skin lesions      LABS:                        13.8   5.0   )-----------( 192      ( 08 Mar 2018 12:25 )             41.3     03-08    140  |  105  |  13  ----------------------------<  100<H>  4.3   |  24  |  0.91    Ca    8.9      08 Mar 2018 12:25  Phos  3.0     03-08  Mg     1.9     03-08            RADIOLOGY & ADDITIONAL STUDIES:  < from: Transthoracic Echocardiogram (18 @ 12:32) >  Dimensions:    Normal Values:  LA:     2.7    2.0 - 4.0 cm  Ao:     2.7    2.0 - 3.8 cm  SEPTUM: 0.7    0.6 - 1.2 cm  PWT:    0.8    0.6 - 1.1 cm  LVIDd:  4.4    3.0 - 5.6 cm  LVIDs:  2.3    1.8 - 4.0 cm  Derived variables:  LVMI: 52 g/m2  RWT: 0.36  Fractional short: 48 %  EF (Lacytz): 79 %  ------------------------------------------------------------------------  Observations:  Mitral Valve: Normal mitral valve.  Aortic Valve/Aorta: Normal trileaflet aortic valve. Minimal  aortic regurgitation.  Aortic Root: 2.7 cm.  Left Atrium: Normal left atrium.  LA volume index = 21  cc/m2.  Left Ventricle: Hyperdynamic left ventricularsystolic  function. Normal left ventricular internal dimensions and  wall thicknesses. Normal diastolic function  Right Heart: Normal right atrium. Normal right ventricular  size and function. Normal tricuspid valve. Minimal  tricuspid regurgitation.Normal pulmonic valve. Minimal  pulmonic regurgitation.  Pericardium/Pleura: Normal pericardium with no pericardial  effusion.  Hemodynamic: Estimated right atrial pressure is 8 mm Hg.  Estimated right ventricular systolic pressure equals 26 mm  Hg, assuming right atrial pressure equals 8 mm Hg,  consistent with normal pulmonary pressures.  ------------------------------------------------------------------------  Conclusions:  1. Normal left ventricular internal dimensions and wall  thicknesses.  2. Hyperdynamic left ventricular systolic function.  *** No previous Echo exam.    < end of copied text >  < from: CT Heart with Coronaries (18 @ 11:11) >  Non-Coronary:    There is no pericardial effusion. Evaluation of the visualized portions   of the lungs demonstrate no significant abnormality. There is pectus   excavatum deformity of the chest wall with a Gemma index of about 5 as   correlated with the recently performed chest CT of 2018.      Coronary:    The left main coronary artery is normal. The left anterior descending   artery is patent. The left circumflex artery is patent. The right   coronary artery, the dominant vessel is patent.    Coronary Calcium Score = 0 Agatston Units.    IMPRESSION: Pectus excavatum deformity of the chest wall as above.    CT coronary angiography shows:  LMCA: Normal.  LAD: Patent.  LCx: Patent.  RCA: Dominant vessel, patent.          < end of copied text >      TELE:

## 2018-03-08 NOTE — PROGRESS NOTE ADULT - ATTENDING COMMENTS
Agree with above NP note.  sinus bradycardia  still with SHEETS  coronaries normal on CTA  ef normal   eps called for continued sb ?? ILR to monitor as outpt  neuro w/u  ? neuromuscular etiology for dyspnea  med/pulmo f/u

## 2018-03-08 NOTE — CONSULT NOTE ADULT - PROBLEM SELECTOR RECOMMENDATION 2
CT results reviewed  Being followed by pulmonary CT results reviewed  Being followed by Pulmonary team

## 2018-03-08 NOTE — CONSULT NOTE ADULT - ASSESSMENT
48yo F presents with intermittent weakness of LUE/LE for years, increasing now with chest pain, suspect cervical etiology, possibly related to stenosis, radiculopathy also considered however it álvarez snot explain leg symptoms. Also to be considered include a demyelinating process.  Plan  1. MRI brain with contrast  2. MRI C spine  3. PT  4. cardiac gale in progress.
Patient is a 47 year old female with past medical history SVT s/p ablation 2012, on acebutolol post ablation and  migraine headaches  Referred to ED by Dr. Orta (Cardiologist )for complaint of worsening SOB over the last several days and left sided chest pain and numbness radiating to left arm. EKG done in office found heart rate down in the 30's. (States her usual baseline HR 40's)  Pt reports taking last taking Acebutolol last on 3/5, in which she took 2 tabs for thinking her HR was elevated.  Denies any smoking, ETOH use or illicit drug use. . EP consulted for sinus bradycardia.
47 female with h/o SVT s/p ablation,  migraine admitted with worsening sob/ chest pain / bradycardia     1. Chest pain   with associated SOB likely symptomatic from bradycardia  no evidence of ACS   cardiac enzymes negative   chest xray clear   Ct angio negaive PE   check Echo   hold acebutolol  start low dose ASA     2. Bradycardia , symptomatic  sinus bradycardia noted, with no evidence of AVB    >2 secondary pauses noted   hold acebutolol  f.u echo
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     Humberto Ferris MD, [facility] - [phone]  Pulmonary Medicine

## 2018-03-08 NOTE — CONSULT NOTE ADULT - ATTENDING COMMENTS
agree with the above assessment and plan by ANA Tom.  atypical CP   history of SVT  No SVT seen  bradycardic on the monitor  Hold BB  if HR improves will obtain a stress  ECHO
unclear that sinus bradycardia and potential chronotropic incompetence is playing a role in her symptoms. would consider ischemia. would consider exercise treadmill test or V02. will follow.

## 2018-03-08 NOTE — DISCHARGE NOTE ADULT - CARE PROVIDERS DIRECT ADDRESSES
,DirectAddress_Unknown ,DirectAddress_Unknown,DirectAddress_Unknown,[email or web address] ,DirectAddress_Unknown,DirectAddress_Unknown,[email or web address],DirectAddress_Unknown

## 2018-03-09 VITALS
SYSTOLIC BLOOD PRESSURE: 99 MMHG | OXYGEN SATURATION: 100 % | HEART RATE: 52 BPM | DIASTOLIC BLOOD PRESSURE: 54 MMHG | TEMPERATURE: 98 F | RESPIRATION RATE: 16 BRPM

## 2018-03-09 LAB
ANION GAP SERPL CALC-SCNC: 9 MMOL/L — SIGNIFICANT CHANGE UP (ref 5–17)
BUN SERPL-MCNC: 14 MG/DL — SIGNIFICANT CHANGE UP (ref 7–23)
CALCIUM SERPL-MCNC: 9.1 MG/DL — SIGNIFICANT CHANGE UP (ref 8.4–10.5)
CHLORIDE SERPL-SCNC: 104 MMOL/L — SIGNIFICANT CHANGE UP (ref 96–108)
CO2 SERPL-SCNC: 25 MMOL/L — SIGNIFICANT CHANGE UP (ref 22–31)
CREAT SERPL-MCNC: 1 MG/DL — SIGNIFICANT CHANGE UP (ref 0.5–1.3)
GLUCOSE SERPL-MCNC: 90 MG/DL — SIGNIFICANT CHANGE UP (ref 70–99)
HCT VFR BLD CALC: 41.6 % — SIGNIFICANT CHANGE UP (ref 34.5–45)
HGB BLD-MCNC: 14.2 G/DL — SIGNIFICANT CHANGE UP (ref 11.5–15.5)
MCHC RBC-ENTMCNC: 31.5 PG — SIGNIFICANT CHANGE UP (ref 27–34)
MCHC RBC-ENTMCNC: 34 GM/DL — SIGNIFICANT CHANGE UP (ref 32–36)
MCV RBC AUTO: 92.5 FL — SIGNIFICANT CHANGE UP (ref 80–100)
PLATELET # BLD AUTO: 186 K/UL — SIGNIFICANT CHANGE UP (ref 150–400)
POTASSIUM SERPL-MCNC: 3.8 MMOL/L — SIGNIFICANT CHANGE UP (ref 3.5–5.3)
POTASSIUM SERPL-SCNC: 3.8 MMOL/L — SIGNIFICANT CHANGE UP (ref 3.5–5.3)
RBC # BLD: 4.5 M/UL — SIGNIFICANT CHANGE UP (ref 3.8–5.2)
RBC # FLD: 11.8 % — SIGNIFICANT CHANGE UP (ref 10.3–14.5)
SODIUM SERPL-SCNC: 138 MMOL/L — SIGNIFICANT CHANGE UP (ref 135–145)
WBC # BLD: 5.6 K/UL — SIGNIFICANT CHANGE UP (ref 3.8–10.5)
WBC # FLD AUTO: 5.6 K/UL — SIGNIFICANT CHANGE UP (ref 3.8–10.5)

## 2018-03-09 PROCEDURE — 82550 ASSAY OF CK (CPK): CPT

## 2018-03-09 PROCEDURE — 93005 ELECTROCARDIOGRAM TRACING: CPT

## 2018-03-09 PROCEDURE — 85379 FIBRIN DEGRADATION QUANT: CPT

## 2018-03-09 PROCEDURE — 94010 BREATHING CAPACITY TEST: CPT | Mod: 26

## 2018-03-09 PROCEDURE — 85730 THROMBOPLASTIN TIME PARTIAL: CPT

## 2018-03-09 PROCEDURE — 85027 COMPLETE CBC AUTOMATED: CPT

## 2018-03-09 PROCEDURE — 80053 COMPREHEN METABOLIC PANEL: CPT

## 2018-03-09 PROCEDURE — 84484 ASSAY OF TROPONIN QUANT: CPT

## 2018-03-09 PROCEDURE — 80048 BASIC METABOLIC PNL TOTAL CA: CPT

## 2018-03-09 PROCEDURE — 33282: CPT

## 2018-03-09 PROCEDURE — C1764: CPT

## 2018-03-09 PROCEDURE — 75574 CT ANGIO HRT W/3D IMAGE: CPT

## 2018-03-09 PROCEDURE — 81001 URINALYSIS AUTO W/SCOPE: CPT

## 2018-03-09 PROCEDURE — 85610 PROTHROMBIN TIME: CPT

## 2018-03-09 PROCEDURE — 84702 CHORIONIC GONADOTROPIN TEST: CPT

## 2018-03-09 PROCEDURE — 93010 ELECTROCARDIOGRAM REPORT: CPT

## 2018-03-09 PROCEDURE — 82607 VITAMIN B-12: CPT

## 2018-03-09 PROCEDURE — 70553 MRI BRAIN STEM W/O & W/DYE: CPT | Mod: 26

## 2018-03-09 PROCEDURE — 71046 X-RAY EXAM CHEST 2 VIEWS: CPT

## 2018-03-09 PROCEDURE — 72141 MRI NECK SPINE W/O DYE: CPT | Mod: 26

## 2018-03-09 PROCEDURE — 84443 ASSAY THYROID STIM HORMONE: CPT

## 2018-03-09 PROCEDURE — 99285 EMERGENCY DEPT VISIT HI MDM: CPT | Mod: 25

## 2018-03-09 PROCEDURE — 70553 MRI BRAIN STEM W/O & W/DYE: CPT

## 2018-03-09 PROCEDURE — 94640 AIRWAY INHALATION TREATMENT: CPT

## 2018-03-09 PROCEDURE — A9585: CPT

## 2018-03-09 PROCEDURE — 82746 ASSAY OF FOLIC ACID SERUM: CPT

## 2018-03-09 PROCEDURE — 72141 MRI NECK SPINE W/O DYE: CPT

## 2018-03-09 PROCEDURE — 83880 ASSAY OF NATRIURETIC PEPTIDE: CPT

## 2018-03-09 PROCEDURE — 93306 TTE W/DOPPLER COMPLETE: CPT

## 2018-03-09 PROCEDURE — 83735 ASSAY OF MAGNESIUM: CPT

## 2018-03-09 PROCEDURE — 82553 CREATINE MB FRACTION: CPT

## 2018-03-09 PROCEDURE — 71275 CT ANGIOGRAPHY CHEST: CPT

## 2018-03-09 PROCEDURE — 84100 ASSAY OF PHOSPHORUS: CPT

## 2018-03-09 RX ORDER — ASPIRIN/CALCIUM CARB/MAGNESIUM 324 MG
81 TABLET ORAL DAILY
Qty: 0 | Refills: 0 | Status: DISCONTINUED | OUTPATIENT
Start: 2018-03-09 | End: 2018-03-09

## 2018-03-09 RX ORDER — ACEBUTOLOL HCL 200 MG
1 CAPSULE ORAL
Qty: 0 | Refills: 0 | COMMUNITY

## 2018-03-09 RX ORDER — ASPIRIN/CALCIUM CARB/MAGNESIUM 324 MG
1 TABLET ORAL
Qty: 0 | Refills: 0 | COMMUNITY
Start: 2018-03-09

## 2018-03-09 RX ADMIN — HEPARIN SODIUM 5000 UNIT(S): 5000 INJECTION INTRAVENOUS; SUBCUTANEOUS at 05:44

## 2018-03-09 RX ADMIN — Medication 81 MILLIGRAM(S): at 16:29

## 2018-03-09 RX ADMIN — Medication 1 TABLET(S): at 16:29

## 2018-03-09 NOTE — PROGRESS NOTE ADULT - PROBLEM SELECTOR PLAN 4
pt requires education on increased efficacy of lexapro if taken daily as she only takes this 3-4x/week  pt states uses ativan 1-2/x for anxiety
no evidence of SVT thus far  will need to hold beta blocker with current bradycardia, if pt develops SVT ? PPM with beta blocker - per cardiology
no evidence of SVT thus far  will need to hold beta blocker with current bradycardia, if pt develops SVT ? PPM with beta blocker - per cardiology
pt requires education on increased efficacy of lexapro if taken daily as she only takes this 3-4x/week  pt states uses ativan 1-2/x for anxiety

## 2018-03-09 NOTE — PROGRESS NOTE ADULT - SUBJECTIVE AND OBJECTIVE BOX
CARDIOLOGY FOLLOW UP - Dr. Marie    CC c.o chest heaviness/ mild SOB this am       PHYSICAL EXAM:  T(C): 36.7 (03-08-18 @ 05:01), Max: 36.8 (03-07-18 @ 21:26)  HR: 55 (03-08-18 @ 07:16) (41 - 55)  BP: 111/71 (03-08-18 @ 07:16) (100/49 - 115/61)  RR: 18 (03-08-18 @ 07:16) (16 - 18)  SpO2: 97% (03-08-18 @ 07:16) (97% - 98%)  Wt(kg): --  I&O's Summary    07 Mar 2018 07:01  -  08 Mar 2018 07:00  --------------------------------------------------------  IN: 960 mL / OUT: 0 mL / NET: 960 mL    08 Mar 2018 07:01  -  08 Mar 2018 12:11  --------------------------------------------------------  IN: 240 mL / OUT: 0 mL / NET: 240 mL        Appearance: Normal	  Cardiovascular: Normal S1 S2, bradycardia   Respiratory: Lungs clear to auscultation	  Gastrointestinal:  Soft, Non-tender, + BS	  Extremities: Normal range of motion, No clubbing, cyanosis or edema        MEDICATIONS  (STANDING):  escitalopram 10 milliGRAM(s) Oral daily  fluticasone propionate 50 MICROgram(s)/spray Nasal Spray 1 Spray(s) Both Nostrils daily  heparin  Injectable 5000 Unit(s) SubCutaneous every 12 hours  multivitamin 1 Tablet(s) Oral daily      TELEMETRY: sinus bradycardia HR 40- 60 	    ECG:  	  RADIOLOGY:   DIAGNOSTIC TESTING:  [x ] Echocardiogram:  < from: Transthoracic Echocardiogram (03.06.18 @ 12:32) >  ------------------------------------------------------------------------  Conclusions:  1. Normal left ventricular internal dimensions and wall  thicknesses.  2. Hyperdynamic left ventricular systolic function.  *** No previous Echo exam.  ------------------------------------------------------------------------    < end of copied text >    [ ]  Catheterization:  [ ] Stress Test:    OTHER: 	    LABS:	 	            03-07    141  |  106  |  14  ----------------------------<  72  4.1   |  27  |  0.99    Ca    8.9      07 Mar 2018 09:48
CARDIOLOGY FOLLOW UP - Dr. Marie    CC no change in symptoms        PHYSICAL EXAM:  T(C): 36.7 (03-09-18 @ 05:01), Max: 37 (03-08-18 @ 14:53)  HR: 48 (03-09-18 @ 05:01) (42 - 57)  BP: 92/40 (03-09-18 @ 05:01) (92/40 - 130/75)  RR: 17 (03-09-18 @ 05:01) (17 - 19)  SpO2: 97% (03-09-18 @ 05:01) (97% - 98%)  Wt(kg): --  I&O's Summary    08 Mar 2018 07:01  -  09 Mar 2018 07:00  --------------------------------------------------------  IN: 661 mL / OUT: 0 mL / NET: 661 mL    09 Mar 2018 07:01  -  09 Mar 2018 11:30  --------------------------------------------------------  IN: 140 mL / OUT: 0 mL / NET: 140 mL        Appearance: Normal	  Cardiovascular: Normal S1 S2,RRR, No JVD, No murmurs  Respiratory: Lungs clear to auscultation	  Gastrointestinal:  Soft, Non-tender, + BS	  Extremities: Normal range of motion, No clubbing, cyanosis or edema        MEDICATIONS  (STANDING):  escitalopram 10 milliGRAM(s) Oral daily  fluticasone propionate 50 MICROgram(s)/spray Nasal Spray 1 Spray(s) Both Nostrils daily  heparin  Injectable 5000 Unit(s) SubCutaneous every 12 hours  multivitamin 1 Tablet(s) Oral daily      TELEMETRY: sinus bradycardia 	HR 40s- 50s      ECG:  sinus bradycardia HR 54/ PAC 	  RADIOLOGY:   DIAGNOSTIC TESTING:  [x ] Echocardiogram:  < from: Transthoracic Echocardiogram (03.06.18 @ 12:32) >  EF (Teicholtz): 79 %    < from: Transthoracic Echocardiogram (03.06.18 @ 12:32) >  ------------------------------------------------------------------------  Conclusions:  1. Normal left ventricular internal dimensions and wall  thicknesses.  2. Hyperdynamic left ventricular systolic function.  *** No previous Echo exam.  ------------------------------------------------------------------------  Confirmed on  3/6/2018 - 14:41:23 by Konstantin Patino M.D.    < end of copied text >    [ ]  Catheterization:  [ ] Stress Test:    OTHER: 	  < from: CT Heart with Coronaries (03.07.18 @ 11:11) >    IMPRESSION: Pectus excavatum deformity of the chest wall as above.    CT coronary angiography shows:  LMCA: Normal.  LAD: Patent.  LCx: Patent.  RCA: Dominant vessel, patent.                  < end of copied text >  -----------------------------------------------------------------------------------------------  < from: MR Cervical Spine No Cont (03.09.18 @ 00:58) >    IMPRESSION: Straightening of the normal cervical adenosis. Mild annular   bulges C2-3 through the C6-7. Small right-sided disc herniation C5-6   extending into the right lateral recess. Small left-sided disc herniation   C6-7 extending into the neural foramen. Unremarkable cord.        -----------------------------------------------------------------------------------------------  < from: MR Head w/wo IV Cont (03.09.18 @ 00:55) >    IMPRESSION: Unremarkable MRI of the brain with and without contrast.      < end of copied text >    LABS:	 	                                14.2   5.6   )-----------( 186      ( 09 Mar 2018 00:56 )             41.6     03-09    138  |  104  |  14  ----------------------------<  90  3.8   |  25  |  1.00    Ca    9.1      09 Mar 2018 00:56  Phos  3.0     03-08  Mg     1.9     03-08
CC: no cp/sob    TELEMETRY: NSR    PHYSICAL EXAM:    T(C): 36.6 (03-07-18 @ 05:06), Max: 36.7 (03-06-18 @ 21:30)  HR: 45 (03-07-18 @ 05:06) (43 - 73)  BP: 107/60 (03-07-18 @ 05:06) (96/60 - 109/58)  RR: 17 (03-07-18 @ 05:06) (17 - 18)  SpO2: 98% (03-07-18 @ 05:06) (98% - 99%)  Wt(kg): --  I&O's Summary    06 Mar 2018 07:01  -  07 Mar 2018 07:00  --------------------------------------------------------  IN: 900 mL / OUT: 0 mL / NET: 900 mL    07 Mar 2018 07:01  -  07 Mar 2018 12:18  --------------------------------------------------------  IN: 240 mL / OUT: 0 mL / NET: 240 mL        Appearance: Normal	  Cardiovascular: Normal S1 S2,RRR, No JVD, No murmurs  Respiratory: Lungs clear to auscultation	  Gastrointestinal:  Soft, Non-tender, + BS	  Extremities: Normal range of motion, No clubbing, cyanosis or edema  Vascular: Peripheral pulses palpable 2+ bilaterally     LABS:	 	                          13.5   4.6   )-----------( 188      ( 06 Mar 2018 10:45 )             39.2     03-07    141  |  106  |  14  ----------------------------<  72  4.1   |  27  |  0.99    Ca    8.9      07 Mar 2018 09:48  Phos  3.2     03-06  Mg     2.0     03-06    TPro  7.6  /  Alb  3.8  /  TBili  0.4  /  DBili  x   /  AST  22  /  ALT  11  /  AlkPhos  74  03-05      PT/INR - ( 05 Mar 2018 18:00 )   PT: 11.9 sec;   INR: 1.10 ratio         PTT - ( 05 Mar 2018 18:00 )  PTT:30.8 sec    CARDIAC MARKERS:
HPI: very tired due MRI last night     MEDICATIONS  (STANDING):  escitalopram 10 milliGRAM(s) Oral daily  fluticasone propionate 50 MICROgram(s)/spray Nasal Spray 1 Spray(s) Both Nostrils daily  heparin  Injectable 5000 Unit(s) SubCutaneous every 12 hours  multivitamin 1 Tablet(s) Oral daily    MEDICATIONS  (PRN):  artificial  tears Solution 1 Drop(s) Both EYES every 8 hours PRN Dry Eyes  LORazepam     Tablet 1 milliGRAM(s) Oral daily PRN Anxiety    Allergies bacitracin (Rash)    REVIEW OF SYSTEM:    Constitutional: denies fever, chills, fatigue  Neuro: denies headache, numbness, feels weak no  dizziness  Resp: denies cough, wheezing, no shortness of breath now  CVS: denies chest pain, palpitations, leg swelling  GI: denies abdominal pain, nausea, vomiting, diarrhea   : denies dysuria, frequency, incontinence  Skin: denies itching, burning, rashes, or lesions   Msk: denies joint pain or swelling    Vital Signs Last 24 Hrs  T(C): 36.7 (09 Mar 2018 05:01), Max: 37 (08 Mar 2018 14:53)  T(F): 98.1 (09 Mar 2018 05:01), Max: 98.6 (08 Mar 2018 14:53)  HR: 48 (09 Mar 2018 05:01) (42 - 57)  BP: 92/40 (09 Mar 2018 05:01) (92/40 - 130/75)  RR: 17 (09 Mar 2018 05:01) (17 - 19)  SpO2: 97% (09 Mar 2018 05:01) (97% - 98%)    Physical Exam:  General : well developed, well nourished,  and no acute distress  Neuro : Alert and oriented x 3, no focal deficits, DIAZ  HEENT : Sclera clear,  neck supple  Lungs: Clear to Ascultation, no wheezing , rales or rhonchi   Cardiovascular : + 1 +2, RRR, no murmurs, no rubs  GI : abdomen soft, NT, ND, + BS   : no suprapubic tenderness  Extremities : No edema, + 1 DP and +1 PT, feet warm   Skin :warm dry    TELE: SB 40 -60 while sleeping, up to 104 when woken from sleep    EKG:    LABS:                        14.2   5.6   )-----------( 186      ( 09 Mar 2018 00:56 )             41.6     03-09    138  |  104  |  14  ----------------------------<  90  3.8   |  25  |  1.00    Ca    9.1      09 Mar 2018 00:56  Phos  3.0     03-08  Mg     1.9     03-08  RADIOLOGY & ADDITIONAL TESTS:  MRI cervical spine and brain done for left sided weakness ,  no acute findings  CT coronaries pectus excavatum Gemma index 5 , normal coronaries, o calcium score  Echo EF 79%
[facility]     PROGRESS NOTE    CHIEF COMPLAINT: dyspnea; chest pain; pectus excavatum    INTERVAL HISTORY: loop recorder placed; remains bradycardic especially when asleep; no shortness of breath at rest, cough, sputum production, chest congestion or wheeze; no chest pain/pressure or palpitations; no fevers, chills or sweats;     REVIEW OF SYSTEMS:  Constitutional: As per interval history  HEENT: Within normal limits  CV: As per interval history  Resp: As per interval history  GI: Within normal limits   : Within normal limits  Musculoskeletal: Within normal limits  Skin: Within normal limits  Neurological: Within normal limits  Psychiatric: Within normal limits  Endocrine: Within normal limits  Hematologic/Lymphatic: Within normal limits  Allergic/Immunologic: Within normal limits    MEDICATIONS:     Pulmonary "      Anti-microbials:      Cardiovascular:      Other:  artificial  tears Solution 1 Drop(s) Both EYES every 8 hours PRN  aspirin  chewable 81 milliGRAM(s) Oral daily  escitalopram 10 milliGRAM(s) Oral daily  fluticasone propionate 50 MICROgram(s)/spray Nasal Spray 1 Spray(s) Both Nostrils daily  heparin  Injectable 5000 Unit(s) SubCutaneous every 12 hours  LORazepam     Tablet 1 milliGRAM(s) Oral daily PRN  multivitamin 1 Tablet(s) Oral daily        OBJECTIVE:    I&O's Detail    08 Mar 2018 07:01  -  09 Mar 2018 07:00  --------------------------------------------------------  IN:    IV PiggyBack: 1 mL    Oral Fluid: 660 mL  Total IN: 661 mL    OUT:  Total OUT: 0 mL    Total NET: 661 mL      09 Mar 2018 07:01  -  09 Mar 2018 16:18  --------------------------------------------------------  IN:    Oral Fluid: 280 mL  Total IN: 280 mL    OUT:  Total OUT: 0 mL    Total NET: 280 mL    PHYSICAL EXAM:       ICU Vital Signs Last 24 Hrs  T(C): 36.8 (09 Mar 2018 15:27), Max: 36.9 (08 Mar 2018 20:27)  T(F): 98.2 (09 Mar 2018 15:27), Max: 98.5 (08 Mar 2018 20:27)  HR: 54 (09 Mar 2018 15:27) (42 - 57)  BP: 112/58 (09 Mar 2018 15:27) (92/40 - 130/75)  BP(mean): --  ABP: --  ABP(mean): --  RR: 16 (09 Mar 2018 15:27) (16 - 20)  SpO2: 100% (09 Mar 2018 15:27) (97% - 100%) on room air     General: Awake. Alert. Cooperative. No distress. Appears stated age 	  HEENT:   Atraumatic. Normocephalic. Anicteric. Normal oral mucosa, PERRL, EOMI  Neck: Supple. Trachea midline. Thyroid without enlargement/tenderness/nodules. No carotid bruit. No JVD	  Cardiovascular: Bradycardic rate and rhythm. S1 S2 normal. No murmurs, rubs or gallops  Respiratory: Respirations unlabored. Clear to auscultation and percussion bilaterally. Significant pectus excavatum deformity.  Abdomen: Soft. Non-tender. Non-distended. No organomegaly. No masses. Normal bowel sounds	  Extremities: Warm to touch. No clubbing or cyanosis. No pedal edema.  Pulses: 2+ peripheral pulses all extremities	  Skin: Normal skin color. No rashes or lesions. No ecchymoses, No cyanosis. Warm to touch  Lymph Nodes: Cervical, supraclavicular and axillary nodes normal  Neurological: Motor and sensory examination equal and normal. A and O x 3  Psychiatry: Appropriate mood and affect.      LABS:                        14.2   5.6   )-----------( 186      ( 09 Mar 2018 00:56 )             41.6                         13.8   5.0   )-----------( 192      ( 08 Mar 2018 12:25 )             41.3     03-09    138  |  104  |  14  ----------------------------<  90  3.8   |  25  |  1.00    03-08    140  |  105  |  13  ----------------------------<  100<H>  4.3   |  24  |  0.91    Ca      9.1      03-09    Ca      8.9      03-08    Phos    3.0     03-08    Phos    3.2     03-06      Mg       1.9     03-08    Mg       2.0     03-06    TPro  7.6  /  Alb  3.8  /  TBili  0.4  /  DBili  x   /  AST  22  /  ALT  11  /  AlkPhos  74  03-05    D-Dimer Assay, Quantitative: 338 ng/mL DDU (03-05 @ 18:00)    Serum Pro-Brain Natriuretic Peptide: 158 pg/mL (03-05 @ 18:00)    CARDIAC MARKERS ( 06 Mar 2018 10:45 )  x     / <0.01 ng/mL / 78 U/L / x     / 1.0 ng/mL  CARDIAC MARKERS ( 06 Mar 2018 02:29 )  x     / <0.01 ng/mL / 80 U/L / x     / 1.1 ng/mL  CARDIAC MARKERS ( 05 Mar 2018 18:00 )  x     / <0.01 ng/mL / 90 U/L / x     / 1.4 ng/mL    < from: Transthoracic Echocardiogram (03.06.18 @ 12:32) >    Patient name: FATOUMATA SOLER  YOB: 1970   Age: 47 (F)   MR#: [id number]  Study Date: 3/6/2018  [facility]  Study quality: Technically good  Referring Physician: Saji Nichole MD  Blood Pressure: 92/50 mmHg  Height: 175 cm  Weight: 78 kg  BSA: 1.9 m2  ------------------------------------------------------------------------  PROCEDURE: Transthoracic echocardiogram with 2-D, M-Mode  and complete spectral and color flow Doppler.  INDICATION: Chest pain, unspecified (R07.9)  ------------------------------------------------------------------------  Dimensions:    Normal Values:  LA:     2.7    2.0 - 4.0 cm  Ao:     2.7    2.0 - 3.8 cm  SEPTUM: 0.7    0.6 - 1.2 cm  PWT:    0.8    0.6 - 1.1 cm  LVIDd:  4.4    3.0 - 5.6 cm  LVIDs:  2.3    1.8 - 4.0 cm  Derived variables:  LVMI: 52 g/m2  RWT: 0.36  Fractional short: 48 %  EF (Teicholtz): 79 %  ------------------------------------------------------------------------  Observations:  Mitral Valve: Normal mitral valve.  Aortic Valve/Aorta: Normal trileaflet aortic valve. Minimal  aortic regurgitation.  Aortic Root: 2.7 cm.  Left Atrium: Normal left atrium.  LA volume index = 21  cc/m2.  Left Ventricle: Hyperdynamic left ventricularsystolic  function. Normal left ventricular internal dimensions and  wall thicknesses. Normal diastolic function  Right Heart: Normal right atrium. Normal right ventricular  size and function. Normal tricuspid valve. Minimal  tricuspid regurgitation. Normal pulmonic valve. Minimal  pulmonic regurgitation.  Pericardium/Pleura: Normal pericardium with no pericardial  effusion.  Hemodynamic: Estimated right atrial pressure is 8 mm Hg.  Estimated right ventricular systolic pressure equals 26 mm  Hg, assuming right atrial pressure equals 8 mm Hg,  consistent with normal pulmonary pressures.  ------------------------------------------------------------------------  Conclusions:  1. Normal left ventricular internal dimensions and wall  thicknesses.  2. Hyperdynamic left ventricular systolic function.  *** No previous Echo exam.  ------------------------------------------------------------------------  Confirmed on  3/6/2018 - 14:41:23 by Konstantin Patino M.D.  ------------------------------------------------------------------------    < end of copied text >  ---------------------------------------------------------------------------------------------------------      MICROBIOLOGY:       RADIOLOGY:  [x] Chest radiographs reviewed and interpreted by me    < from: MR Head w/wo IV Cont (03.09.18 @ 00:55) >    EXAM:  MR BRAIN WAW IC                            PROCEDURE DATE:  03/09/2018        INTERPRETATION:    CLINICAL INDICATION: Left sided weakness      Magnetic resonance imaging of the brain was carried out with transaxial   SPGR, FLAIR, fast spin echo T2 weighted images, axial gradient echo   series, diffusion weighted series and sagittal T1 weighted series on a   1.5 Denise magnet.    No prior brain imaging is available for comparison.    The fourth, third and lateral ventricles are normal in size and position.   There is no hemorrhage, mass or shift of the midline structures. No   abnormal signal intensity is identified within the brain parenchyma on   the T1, T2 or FLAIR sequences. Infarcts or hemorrhage are seen. After   contrast infusion there is normal vascular enhancement. No abnormal   parenchymal or leptomeningeal enhancement is identified.    IMPRESSION: Unremarkable MRI of the brain with and without contrast.    WESLEY BORRERO M.D., ATTENDING RADIOLOGIST  This document has been electronically signed. Mar  9 2018 10:02AM      < end of copied text >  ---------------------------------------------------------------------------------------------------------      < from: MR Cervical Spine No Cont (03.09.18 @ 00:58) >    EXAM:  MR SPINE CERVICAL                            PROCEDURE DATE:  03/09/2018            INTERPRETATION:    CLINICAL INDICATION: Left sided weakness    Magnetic resonance imaging of the cervical spine was carried out with   sagittal surface coil imaging from C1 to T5 using T1 and fast spin echo   T2 weighted images with and without fat saturation technique with axial   T1 and fast spin echo T2 weighted imaging on a 1.5 Denise magnet.      The cervical vertebrae are normal in height. Slightly low signal   intensity on T1-weighted images is identified involving the marrow signal   which is nonspecific but may be due changes of osteopenia or anemia.   Straightening of the normal cervical lordosis. Mild annular bulges from   C2-3 through C6-7 without significant canal narrowing. On the right at   C5-6 there is a small disc herniation which minimally extends into the   right lateral recess. At C6-7 there is left neural foraminal narrowing   due to disc osteophyte.      The cervical cord is normal in size, contour, and signal characteristics.      IMPRESSION: Straightening of the normal cervical adenosis. Mild annular   bulges C2-3 through the C6-7. Small right-sided disc herniation C5-6   extending into the right lateral recess. Small left-sided disc herniation   C6-7 extending into the neural foramen. Unremarkable cord.    WESLEY BORRERO M.D., ATTENDING RADIOLOGIST  This document has been electronically signed. Mar  9 2018  8:57AM      < end of copied text >  ---------------------------------------------------------------------------------------------------------  < from: Xray Chest 2 Views PA/Lat (03.05.18 @ 19:02) >    EXAM:  XR CHEST PA LAT 2V                            PROCEDURE DATE:  03/05/2018        INTERPRETATION:  CLINICAL INDICATION: Shortness of breath.    TECHNIQUE: Frontal and lateral views of the chest    COMPARISON: CT the abdomen pelvis from 5/9/2011.    FINDINGS:     There is a right middle lobe opacity due to a pectus excavatum.  There is no pleural effusion or pneumothorax.  The heart size is normal.  There is no acute osseous abnormality.    IMPRESSION:    No acute disease. Pectus excavatum.      KAREN LEE M.D., RADIOLOGY RESIDENT  This document has been electronically signed.  JAVIER VASQUEZ M.D., ATTENDING RADIOLOGIST  This document has been electronically signed. Mar  6 2018  9:03AM      < end of copied text >  ---------------------------------------------------------------------------------------------------------    < from: CT Angio Chest w/ IV Cont (03.05.18 @ 19:25) >    EXAM:  CT ANGIO CHEST (W)AW IC                            PROCEDURE DATE:  03/05/2018      INTERPRETATION:  CLINICAL INFORMATION: Dyspnea. Evaluate for PE.    COMPARISON: No similar available comparisons.    [facility]ED[facility]  90 ml of Omnipaque 350 was injected intravenously. 10 ml were discarded.  Sagittal and coronal reformats were performed as well as 3D (MIP)   reconstructions.    FINDINGS: Slightly motion degraded study.    CHEST:     LUNGS AND LARGE AIRWAYS: Patent central airways. No pulmonary nodules.  PLEURA: No pleural effusion.  VESSELS: No pulmonary artery embolism.  HEART: Heart size is normal. No pericardial effusion.  MEDIASTINUM AND VINH: No lymphadenopathy.  CHEST WALL AND LOWER NECK: Pectus excavatum deformity.  VISUALIZED UPPER ABDOMEN: Within normal limits.  BONES: Within normal limits.    IMPRESSION:     No pulmonary embolism. Clear lungs.      ELVIS HOOKER M.D., RADIOLOGY RESIDENT  This document has been electronically signed.  GOLDEN CANCINO M.D., ATTENDING RADIOLOGIST  This document has been electronically signed. Mar  5 2018  8:23PM      < end of copied text >  ---------------------------------------------------------------------------------------------------------    < from: CT Heart with Coronaries (03.07.18 @ 11:11) >    EXAM:  CT HEART WITH CORONARIES                            PROCEDURE DATE:  03/07/2018        INTERPRETATION:  CLINICAL INDICATION: Chest pain, bradycardia.    Procedure: Informed consent was obtained from the patient and there were   no complications during the procedure. ECG gated CT of the heart was   initially performed without contrast administration. An ECG gated   coronary CT angiogram following administration of 60 cc's of Omnipaque   350. The patient was pretreated with 0 mg of IV metoprolol and 0.4 mg of   sublingual nitroglycerin; resting heart rate at time of the examination   was 37 beats/min. Axial two dimensional and three-dimensional images were   reconstructed using an analysis workstation.     FINDINGS:    Non-Coronary:    There is no pericardial effusion. Evaluation of the visualized portions   of the lungs demonstrate no significant abnormality. There is pectus   excavatum deformity of the chest wall with a Gemma index of about 5 as   correlated with the recently performed chest CT of March 5, 2018.      Coronary:    The left main coronary artery is normal. The left anterior descending   artery is patent. The left circumflex artery is patent. The right   coronary artery, the dominant vessel is patent.    Coronary Calcium Score = 0 Agatston Units.    IMPRESSION: Pectus excavatum deformity of the chest wall as above.    CT coronary angiography shows:  LMCA: Normal.  LAD: Patent.  LCx: Patent.  RCA: Dominant vessel, patent.    MICHELE ACUNA M.D. ATTENDING RADIOLOGIST  This document has been electronically signed. Mar  7 2018 11:39AM      < end of copied text >  ---------------------------------------------------------------------------------------------------------  SPIROMETRY:    FEV1 2.22 liters - 74% predicted  FVC 2.83 liters - 77% predicted  FEV1% - 78    c/w mild obstructive lung disease
Patient is a 47y old  Female who presents with a chief complaint of chest pain and sob (08 Mar 2018 00:20)    HPI:  pt seen, no new events, no weakness or pain.    PAST MEDICAL & SURGICAL HISTORY:  Migraine: with menstrual cycle  SVT (Supraventricular Tachycardia)   delivery delivered: 16 years ago    FAMILY HISTORY:    Social Hx:  Nonsmoker, no drug or alcohol use  MEDICATIONS  (STANDING):  escitalopram 10 milliGRAM(s) Oral daily  fluticasone propionate 50 MICROgram(s)/spray Nasal Spray 1 Spray(s) Both Nostrils daily  heparin  Injectable 5000 Unit(s) SubCutaneous every 12 hours  multivitamin 1 Tablet(s) Oral daily    MEDICATIONS  (PRN):  artificial  tears Solution 1 Drop(s) Both EYES every 8 hours PRN Dry Eyes  LORazepam     Tablet 1 milliGRAM(s) Oral daily PRN Anxiety    Allergies    bacitracin (Rash)    Intolerances      ROS: Pertinent positives in HPI, all other ROS were reviewed and are negative.    Vital Signs Last 24 Hrs  T(C): 36.7 (09 Mar 2018 05:01), Max: 37 (08 Mar 2018 14:53)  T(F): 98.1 (09 Mar 2018 05:01), Max: 98.6 (08 Mar 2018 14:53)  HR: 48 (09 Mar 2018 05:01) (42 - 57)  BP: 92/40 (09 Mar 2018 05:01) (92/40 - 130/75)  BP(mean): --  RR: 17 (09 Mar 2018 05:01) (17 - 19)  SpO2: 97% (09 Mar 2018 05:01) (97% - 98%)  GENERAL EXAM:  Constitutional: awake and alert. NAD  NEUROLOGICAL EXAM:  MS: AAOX3, fluent, attends b/l; recent and remote memory intact; normal attention, language and fund of knowledge.   CN: VFF, EOMI, PERRL, no KARIN, no APD,  V1-3 intact, no facial asymmetry, t/p midline, SCM/trap intact.  Motor: Strength: 5/5 4x. Tone: normal. Bulk: normal. DTR 2+ symm.  Plantar flex b/l. Sensation: intact to LT/PP/Vibration/Position/Temperature 4x.   Coordination: intact 4x.     Labs:                         14.2   5.6   )-----------( 186      ( 09 Mar 2018 00:56 )             41.6     03-09    138  |  104  |  14  ----------------------------<  90  3.8   |  25  |  1.00    Ca    9.1      09 Mar 2018 00:56  Phos  3.0     03-08  Mg     1.9     03-08                CAPILLARY BLOOD GLUCOSE            Radiology:  -CT Head:  -MRI brain  -MRA brain/Carotids  -EEG
Patient is a 47y old  Female who presents with a chief complaint of chest pain and sob (05 Mar 2018 20:47)      SUBJECTIVE / OVERNIGHT EVENTS:  H&P reviwed.  pt feels sob even at rest.    MEDICATIONS  (STANDING):  escitalopram 10 milliGRAM(s) Oral daily  fluticasone propionate 50 MICROgram(s)/spray Nasal Spray 1 Spray(s) Both Nostrils daily  heparin  Injectable 5000 Unit(s) SubCutaneous every 12 hours  influenza   Vaccine 0.5 milliLiter(s) IntraMuscular once  multivitamin 1 Tablet(s) Oral daily    MEDICATIONS  (PRN):  artificial  tears Solution 1 Drop(s) Both EYES every 8 hours PRN Dry Eyes  LORazepam     Tablet 1 milliGRAM(s) Oral daily PRN Anxiety      Vital Signs Last 24 Hrs  T(C): 36.7 (06 Mar 2018 09:30), Max: 36.8 (05 Mar 2018 21:09)  T(F): 98 (06 Mar 2018 09:30), Max: 98.3 (05 Mar 2018 21:09)  HR: 45 (06 Mar 2018 09:30) (39 - 49)  BP: 100/59 (06 Mar 2018 09:30) (90/51 - 143/76)  BP(mean): 84 (05 Mar 2018 20:47) (84 - 84)  RR: 17 (06 Mar 2018 09:30) (16 - 18)  SpO2: 97% (06 Mar 2018 09:30) (97% - 100%)  CAPILLARY BLOOD GLUCOSE        I&O's Summary    05 Mar 2018 07:01  -  06 Mar 2018 07:00  --------------------------------------------------------  IN: 240 mL / OUT: 0 mL / NET: 240 mL    06 Mar 2018 07:01  -  06 Mar 2018 10:50  --------------------------------------------------------  IN: 240 mL / OUT: 0 mL / NET: 240 mL        PHYSICAL EXAM:  GENERAL: NAD, well-developed  HEAD:  Atraumatic, Normocephalic  EYES: EOMI, PERRLA, conjunctiva and sclera clear  NECK: Supple, No JVD  CHEST/LUNG: Clear to auscultation bilaterally; No wheeze  HEART: Regular rate and rhythm; No murmurs, rubs, or gallops  ABDOMEN: Soft, Nontender, Nondistended; Bowel sounds present  EXTREMITIES:  2+ Peripheral Pulses, No clubbing, cyanosis, or edema  PSYCH: AAOx3  NEUROLOGY: non-focal  SKIN: No rashes or lesions    LABS:                        14.0   4.9   )-----------( 195      ( 05 Mar 2018 18:00 )             41.5     03-05    140  |  103  |  15  ----------------------------<  89  4.4   |  26  |  1.02    Ca    9.3      05 Mar 2018 18:00    TPro  7.6  /  Alb  3.8  /  TBili  0.4  /  DBili  x   /  AST  22  /  ALT  11  /  AlkPhos  74  03-05    PT/INR - ( 05 Mar 2018 18:00 )   PT: 11.9 sec;   INR: 1.10 ratio         PTT - ( 05 Mar 2018 18:00 )  PTT:30.8 sec  CARDIAC MARKERS ( 06 Mar 2018 02:29 )  x     / <0.01 ng/mL / 80 U/L / x     / 1.1 ng/mL  CARDIAC MARKERS ( 05 Mar 2018 18:00 )  x     / <0.01 ng/mL / 90 U/L / x     / 1.4 ng/mL      Urinalysis Basic - ( 06 Mar 2018 09:39 )    Color: Yellow / Appearance: SL Turbid / SG: >1.030 / pH: x  Gluc: x / Ketone: Negative  / Bili: Negative / Urobili: Negative   Blood: x / Protein: Negative / Nitrite: Negative   Leuk Esterase: Negative / RBC: 0-2 /HPF / WBC 3-5 /HPF   Sq Epi: x / Non Sq Epi: Few /HPF / Bacteria: x        RADIOLOGY & ADDITIONAL TESTS:    Imaging Personally Reviewed:    Consultant(s) Notes Reviewed:      Care Discussed with Consultants/Other Providers:
Patient is a 47y old  Female who presents with a chief complaint of chest pain and sob (08 Mar 2018 00:20)      SUBJECTIVE / OVERNIGHT EVENTS :  pt still sob.  worse with exersion.  i discussed her care with Dr Guthrie, Dr tesfaye.      MEDICATIONS  (STANDING):  aspirin  chewable 81 milliGRAM(s) Oral daily  escitalopram 10 milliGRAM(s) Oral daily  fluticasone propionate 50 MICROgram(s)/spray Nasal Spray 1 Spray(s) Both Nostrils daily  heparin  Injectable 5000 Unit(s) SubCutaneous every 12 hours  multivitamin 1 Tablet(s) Oral daily    MEDICATIONS  (PRN):  artificial  tears Solution 1 Drop(s) Both EYES every 8 hours PRN Dry Eyes  LORazepam     Tablet 1 milliGRAM(s) Oral daily PRN Anxiety      Vital Signs Last 24 Hrs  T(C): 36.7 (09 Mar 2018 05:01), Max: 37 (08 Mar 2018 14:53)  T(F): 98.1 (09 Mar 2018 05:01), Max: 98.6 (08 Mar 2018 14:53)  HR: 48 (09 Mar 2018 05:01) (42 - 57)  BP: 92/40 (09 Mar 2018 05:01) (92/40 - 130/75)  BP(mean): --  RR: 17 (09 Mar 2018 05:01) (17 - 19)  SpO2: 97% (09 Mar 2018 05:01) (97% - 98%)  CAPILLARY BLOOD GLUCOSE        I&O's Summary    08 Mar 2018 07:01  -  09 Mar 2018 07:00  --------------------------------------------------------  IN: 661 mL / OUT: 0 mL / NET: 661 mL    09 Mar 2018 07:01  -  09 Mar 2018 13:26  --------------------------------------------------------  IN: 140 mL / OUT: 0 mL / NET: 140 mL        PHYSICAL EXAM:  GENERAL: NAD, well-developed  HEAD:  Atraumatic, Normocephalic  EYES: EOMI, PERRLA, conjunctiva and sclera clear  NECK: Supple, No JVD  CHEST/LUNG: Clear to auscultation bilaterally; No wheeze  HEART: Regular rate and rhythm; No murmurs, rubs, or gallops  ABDOMEN: Soft, Nontender, Nondistended; Bowel sounds present  EXTREMITIES:  2+ Peripheral Pulses, No clubbing, cyanosis, or edema  PSYCH: AAOx3  NEUROLOGY: non-focal  SKIN: No rashes or lesions    LABS:                        14.2   5.6   )-----------( 186      ( 09 Mar 2018 00:56 )             41.6     03-09    138  |  104  |  14  ----------------------------<  90  3.8   |  25  |  1.00    Ca    9.1      09 Mar 2018 00:56  Phos  3.0     03-08  Mg     1.9     03-08                RADIOLOGY & ADDITIONAL TESTS:    Imaging Personally Reviewed:    Consultant(s) Notes Reviewed:      Care Discussed with Consultants/Other Providers:
Patient is a 47y old  Female who presents with a chief complaint of chest pain and sob (05 Mar 2018 20:47)      SUBJECTIVE / OVERNIGHT EVENTS:  No chest pain. No shortness of breath. No complaints. No new events overnight.  tele shows sinus 80 to 100 bradycardia as low as 35.    MEDICATIONS  (STANDING):  escitalopram 10 milliGRAM(s) Oral daily  fluticasone propionate 50 MICROgram(s)/spray Nasal Spray 1 Spray(s) Both Nostrils daily  heparin  Injectable 5000 Unit(s) SubCutaneous every 12 hours  influenza   Vaccine 0.5 milliLiter(s) IntraMuscular once  multivitamin 1 Tablet(s) Oral daily    MEDICATIONS  (PRN):  artificial  tears Solution 1 Drop(s) Both EYES every 8 hours PRN Dry Eyes  LORazepam     Tablet 1 milliGRAM(s) Oral daily PRN Anxiety      Vital Signs Last 24 Hrs  T(C): 36.6 (07 Mar 2018 05:06), Max: 36.7 (06 Mar 2018 21:30)  T(F): 97.9 (07 Mar 2018 05:06), Max: 98 (06 Mar 2018 21:30)  HR: 45 (07 Mar 2018 05:06) (43 - 73)  BP: 107/60 (07 Mar 2018 05:06) (96/60 - 109/58)  BP(mean): --  RR: 17 (07 Mar 2018 05:06) (17 - 18)  SpO2: 98% (07 Mar 2018 05:06) (98% - 99%)  CAPILLARY BLOOD GLUCOSE        I&O's Summary    06 Mar 2018 07:01  -  07 Mar 2018 07:00  --------------------------------------------------------  IN: 900 mL / OUT: 0 mL / NET: 900 mL    07 Mar 2018 07:01  -  07 Mar 2018 10:32  --------------------------------------------------------  IN: 240 mL / OUT: 0 mL / NET: 240 mL        PHYSICAL EXAM:  GENERAL: NAD, well-developed  HEAD:  Atraumatic, Normocephalic  EYES: EOMI, PERRLA, conjunctiva and sclera clear  NECK: Supple, No JVD  CHEST/LUNG: Clear to auscultation bilaterally; No wheeze  HEART: Regular rate and rhythm; No murmurs, rubs, or gallops  ABDOMEN: Soft, Nontender, Nondistended; Bowel sounds present  EXTREMITIES:  2+ Peripheral Pulses, No clubbing, cyanosis, or edema  PSYCH: AAOx3  NEUROLOGY: non-focal  SKIN: No rashes or lesions    LABS:                        13.5   4.6   )-----------( 188      ( 06 Mar 2018 10:45 )             39.2     03-07    141  |  106  |  14  ----------------------------<  72  4.1   |  27  |  0.99    Ca    8.9      07 Mar 2018 09:48  Phos  3.2     03-06  Mg     2.0     03-06    TPro  7.6  /  Alb  3.8  /  TBili  0.4  /  DBili  x   /  AST  22  /  ALT  11  /  AlkPhos  74  03-05    PT/INR - ( 05 Mar 2018 18:00 )   PT: 11.9 sec;   INR: 1.10 ratio         PTT - ( 05 Mar 2018 18:00 )  PTT:30.8 sec  CARDIAC MARKERS ( 06 Mar 2018 10:45 )  x     / <0.01 ng/mL / 78 U/L / x     / 1.0 ng/mL  CARDIAC MARKERS ( 06 Mar 2018 02:29 )  x     / <0.01 ng/mL / 80 U/L / x     / 1.1 ng/mL  CARDIAC MARKERS ( 05 Mar 2018 18:00 )  x     / <0.01 ng/mL / 90 U/L / x     / 1.4 ng/mL      Urinalysis Basic - ( 06 Mar 2018 09:39 )    Color: Yellow / Appearance: SL Turbid / SG: >1.030 / pH: x  Gluc: x / Ketone: Negative  / Bili: Negative / Urobili: Negative   Blood: x / Protein: Negative / Nitrite: Negative   Leuk Esterase: Negative / RBC: 0-2 /HPF / WBC 3-5 /HPF   Sq Epi: x / Non Sq Epi: Few /HPF / Bacteria: x        RADIOLOGY & ADDITIONAL TESTS:    Imaging Personally Reviewed:  < from: Transthoracic Echocardiogram (03.06.18 @ 12:32) >  Conclusions:  1. Normal left ventricular internal dimensions and wall  thicknesses.  2. Hyperdynamic left ventricular systolic function.    < end of copied text >      Consultant(s) Notes Reviewed:      Care Discussed with Consultants/Other Providers:
Patient is a 47y old  Female who presents with a chief complaint of chest pain and sob (08 Mar 2018 00:20)      SUBJECTIVE / OVERNIGHT EVENTS:  pt still feeling sob even at rest or minimal exertion and with talking.  c/o left sided chest pressure and left sided weakness.  tele showed 40 to 60 sinus with hr 26 this am.    MEDICATIONS  (STANDING):  escitalopram 10 milliGRAM(s) Oral daily  fluticasone propionate 50 MICROgram(s)/spray Nasal Spray 1 Spray(s) Both Nostrils daily  heparin  Injectable 5000 Unit(s) SubCutaneous every 12 hours  multivitamin 1 Tablet(s) Oral daily    MEDICATIONS  (PRN):  artificial  tears Solution 1 Drop(s) Both EYES every 8 hours PRN Dry Eyes  LORazepam     Tablet 1 milliGRAM(s) Oral daily PRN Anxiety      Vital Signs Last 24 Hrs  T(C): 36.7 (08 Mar 2018 05:01), Max: 36.8 (07 Mar 2018 21:26)  T(F): 98.1 (08 Mar 2018 05:01), Max: 98.2 (07 Mar 2018 21:26)  HR: 52 (08 Mar 2018 06:20) (41 - 55)  BP: 100/49 (08 Mar 2018 06:20) (100/49 - 115/61)  BP(mean): --  RR: 17 (08 Mar 2018 06:20) (16 - 17)  SpO2: 98% (08 Mar 2018 06:20) (98% - 98%)  CAPILLARY BLOOD GLUCOSE        I&O's Summary    07 Mar 2018 07:01  -  08 Mar 2018 07:00  --------------------------------------------------------  IN: 960 mL / OUT: 0 mL / NET: 960 mL    08 Mar 2018 07:01  -  08 Mar 2018 11:16  --------------------------------------------------------  IN: 240 mL / OUT: 0 mL / NET: 240 mL        PHYSICAL EXAM:  GENERAL: NAD, well-developed  HEAD:  Atraumatic, Normocephalic  EYES: EOMI, PERRLA, conjunctiva and sclera clear  NECK: Supple, No JVD  CHEST/LUNG: Clear to auscultation bilaterally; No wheeze  HEART: Regular rate and rhythm; No murmurs, rubs, or gallops  ABDOMEN: Soft, Nontender, Nondistended; Bowel sounds present  EXTREMITIES:  2+ Peripheral Pulses, No clubbing, cyanosis, or edema  PSYCH: AAOx3  NEUROLOGY: non-focal  SKIN: No rashes or lesions    LABS:    03-07    141  |  106  |  14  ----------------------------<  72  4.1   |  27  |  0.99    Ca    8.9      07 Mar 2018 09:48                RADIOLOGY & ADDITIONAL TESTS:    Imaging Personally Reviewed  < from: CT Heart with Coronaries (03.07.18 @ 11:11) >  IMPRESSION: Pectus excavatum deformity of the chest wall as above.    CT coronary angiography shows:  LMCA: Normal.  LAD: Patent.  LCx: Patent.  RCA: Dominant vessel, patent.    < end of copied text >      Consultant(s) Notes Reviewed:      Care Discussed with Consultants/Other Providers:

## 2018-03-09 NOTE — PROGRESS NOTE ADULT - PROBLEM SELECTOR PLAN 5
monitor
pt requires education on increased efficacy of lexapro if taken daily as she only takes this 3-4x/week  pt states uses ativan 1-2/x for anxiety
monitor
pt requires education on increased efficacy of lexapro if taken daily as she only takes this 3-4x/week  pt states uses ativan 1-2/x for anxiety

## 2018-03-09 NOTE — PROGRESS NOTE ADULT - PROBLEM SELECTOR PLAN 2
unclear etiology  CTA negative for PE or pulmonary pathology  TTE to done  possibly secondary to bradycardia.  hold beta-blocker
unclear etiology  CTA negative for PE or pulmonary pathology  TTE to done  pulm eval called
unclear etiology  CTA negative for PE or pulmonary pathology  will ck TTE to evaluate cardiac function and valves  possibly secondary to bradycardia.  hold beta-blocker
unclear etiology  CTA negative for PE or pulmonary pathology  TTE to done  pulm eval appreciated

## 2018-03-09 NOTE — PROGRESS NOTE ADULT - PROVIDER SPECIALTY LIST ADULT
Cardiology
Electrophysiology
Neurology
Pulmonology
Internal Medicine

## 2018-03-09 NOTE — PROGRESS NOTE ADULT - PROBLEM SELECTOR PLAN 1
given pain is reproducible doubt cardiac etiology though given cardiac history will r/o ACS with Manas negative x 2  monitor on telemetry for arrhythmia  new t depression in V3 only, repeat ekg in am for trend
r/o ACS with Manas negative x 2  monitor on telemetry for arrhythmia  cardiac ct negative  EP eval noted.  poss ILR
r/o ACS with Manas negative x 2  monitor on telemetry for arrhythmia  cardiac ct per cardiology
r/o ACS with Manas negative x 2  monitor on telemetry for arrhythmia  cardiac ct negative  EP eval called

## 2018-03-09 NOTE — PROGRESS NOTE ADULT - PROBLEM SELECTOR PLAN 3
neuro eval called  mri of brain and c spine negative for any acute findings.
neuro eval called  for mri of brain and c spine.
no evidence of SVT thus far  will need to hold beta blocker with current bradycardia, if pt develops SVT ? PPM with beta blocker - per cardiology
no evidence of SVT thus far  will need to hold beta blocker with current bradycardia, if pt develops SVT ? PPM with beta blocker - per cardiology

## 2018-03-09 NOTE — PROGRESS NOTE ADULT - PROBLEM SELECTOR PROBLEM 3
SVT (supraventricular tachycardia)
Weakness of left arm
SVT (supraventricular tachycardia)
Weakness of left arm

## 2018-03-22 ENCOUNTER — OUTPATIENT (OUTPATIENT)
Dept: OUTPATIENT SERVICES | Facility: HOSPITAL | Age: 48
LOS: 1 days | End: 2018-03-22
Payer: COMMERCIAL

## 2018-03-22 ENCOUNTER — APPOINTMENT (OUTPATIENT)
Dept: MRI IMAGING | Facility: CLINIC | Age: 48
End: 2018-03-22
Payer: COMMERCIAL

## 2018-03-22 DIAGNOSIS — Z00.8 ENCOUNTER FOR OTHER GENERAL EXAMINATION: ICD-10-CM

## 2018-03-22 PROCEDURE — 72146 MRI CHEST SPINE W/O DYE: CPT | Mod: 26

## 2018-03-22 PROCEDURE — 72146 MRI CHEST SPINE W/O DYE: CPT

## 2018-03-22 PROCEDURE — 72148 MRI LUMBAR SPINE W/O DYE: CPT | Mod: 26

## 2018-03-22 PROCEDURE — 72148 MRI LUMBAR SPINE W/O DYE: CPT

## 2018-03-23 ENCOUNTER — APPOINTMENT (OUTPATIENT)
Dept: ELECTROPHYSIOLOGY | Facility: CLINIC | Age: 48
End: 2018-03-23
Payer: COMMERCIAL

## 2018-03-23 ENCOUNTER — NON-APPOINTMENT (OUTPATIENT)
Age: 48
End: 2018-03-23

## 2018-03-23 VITALS
BODY MASS INDEX: 24.97 KG/M2 | SYSTOLIC BLOOD PRESSURE: 104 MMHG | DIASTOLIC BLOOD PRESSURE: 73 MMHG | WEIGHT: 174 LBS | OXYGEN SATURATION: 100 % | HEART RATE: 58 BPM

## 2018-03-23 PROCEDURE — 99024 POSTOP FOLLOW-UP VISIT: CPT

## 2018-04-06 ENCOUNTER — APPOINTMENT (OUTPATIENT)
Dept: ELECTROPHYSIOLOGY | Facility: CLINIC | Age: 48
End: 2018-04-06
Payer: COMMERCIAL

## 2018-04-06 VITALS
BODY MASS INDEX: 24.91 KG/M2 | HEART RATE: 50 BPM | SYSTOLIC BLOOD PRESSURE: 113 MMHG | HEIGHT: 70 IN | DIASTOLIC BLOOD PRESSURE: 63 MMHG | OXYGEN SATURATION: 99 % | WEIGHT: 174 LBS

## 2018-04-06 PROCEDURE — 99215 OFFICE O/P EST HI 40 MIN: CPT | Mod: 24

## 2018-04-06 PROCEDURE — 93000 ELECTROCARDIOGRAM COMPLETE: CPT

## 2018-04-06 RX ORDER — FLUTICASONE PROPIONATE 50 UG/1
50 SPRAY, METERED NASAL
Qty: 16 | Refills: 0 | Status: ACTIVE | COMMUNITY
Start: 2017-12-27

## 2018-04-06 RX ORDER — VALACYCLOVIR 500 MG/1
500 TABLET, FILM COATED ORAL
Qty: 60 | Refills: 0 | Status: ACTIVE | COMMUNITY
Start: 2017-12-27

## 2018-04-06 RX ORDER — LORAZEPAM 1 MG/1
1 TABLET ORAL
Qty: 30 | Refills: 0 | Status: ACTIVE | COMMUNITY
Start: 2017-11-20

## 2018-04-23 ENCOUNTER — CHART COPY (OUTPATIENT)
Age: 48
End: 2018-04-23

## 2018-04-24 ENCOUNTER — APPOINTMENT (OUTPATIENT)
Dept: ELECTROPHYSIOLOGY | Facility: CLINIC | Age: 48
End: 2018-04-24
Payer: COMMERCIAL

## 2018-04-24 ENCOUNTER — EMERGENCY (EMERGENCY)
Facility: HOSPITAL | Age: 48
LOS: 1 days | Discharge: HOME | End: 2018-04-24
Attending: EMERGENCY MEDICINE
Payer: COMMERCIAL

## 2018-04-24 VITALS
SYSTOLIC BLOOD PRESSURE: 105 MMHG | TEMPERATURE: 99 F | RESPIRATION RATE: 18 BRPM | HEART RATE: 58 BPM | DIASTOLIC BLOOD PRESSURE: 66 MMHG | OXYGEN SATURATION: 97 %

## 2018-04-24 DIAGNOSIS — R00.1 BRADYCARDIA, UNSPECIFIED: ICD-10-CM

## 2018-04-24 DIAGNOSIS — R55 SYNCOPE AND COLLAPSE: ICD-10-CM

## 2018-04-24 LAB
ANION GAP SERPL CALC-SCNC: 10 MMOL/L — SIGNIFICANT CHANGE UP (ref 5–17)
APTT BLD: 29.2 SEC — SIGNIFICANT CHANGE UP (ref 27.5–37.4)
BASOPHILS # BLD AUTO: 0.1 K/UL — SIGNIFICANT CHANGE UP (ref 0–0.2)
BASOPHILS NFR BLD AUTO: 1 % — SIGNIFICANT CHANGE UP (ref 0–2)
BUN SERPL-MCNC: 10 MG/DL — SIGNIFICANT CHANGE UP (ref 7–23)
CALCIUM SERPL-MCNC: 10.2 MG/DL — SIGNIFICANT CHANGE UP (ref 8.4–10.5)
CHLORIDE SERPL-SCNC: 100 MMOL/L — SIGNIFICANT CHANGE UP (ref 96–108)
CK MB CFR SERPL CALC: 1 NG/ML — SIGNIFICANT CHANGE UP (ref 0–3.8)
CO2 SERPL-SCNC: 27 MMOL/L — SIGNIFICANT CHANGE UP (ref 22–31)
CREAT SERPL-MCNC: 0.94 MG/DL — SIGNIFICANT CHANGE UP (ref 0.5–1.3)
EOSINOPHIL # BLD AUTO: 0.1 K/UL — SIGNIFICANT CHANGE UP (ref 0–0.5)
EOSINOPHIL NFR BLD AUTO: 1 % — SIGNIFICANT CHANGE UP (ref 0–6)
GLUCOSE SERPL-MCNC: 86 MG/DL — SIGNIFICANT CHANGE UP (ref 70–99)
HCT VFR BLD CALC: 44.6 % — SIGNIFICANT CHANGE UP (ref 34.5–45)
HGB BLD-MCNC: 14.9 G/DL — SIGNIFICANT CHANGE UP (ref 11.5–15.5)
INR BLD: 1.06 RATIO — SIGNIFICANT CHANGE UP (ref 0.88–1.16)
LYMPHOCYTES # BLD AUTO: 1.6 K/UL — SIGNIFICANT CHANGE UP (ref 1–3.3)
LYMPHOCYTES # BLD AUTO: 23.3 % — SIGNIFICANT CHANGE UP (ref 13–44)
MAGNESIUM SERPL-MCNC: 1.9 MG/DL — SIGNIFICANT CHANGE UP (ref 1.6–2.6)
MCHC RBC-ENTMCNC: 31.6 PG — SIGNIFICANT CHANGE UP (ref 27–34)
MCHC RBC-ENTMCNC: 33.4 GM/DL — SIGNIFICANT CHANGE UP (ref 32–36)
MCV RBC AUTO: 94.5 FL — SIGNIFICANT CHANGE UP (ref 80–100)
MONOCYTES # BLD AUTO: 0.8 K/UL — SIGNIFICANT CHANGE UP (ref 0–0.9)
MONOCYTES NFR BLD AUTO: 11.4 % — SIGNIFICANT CHANGE UP (ref 2–14)
NEUTROPHILS # BLD AUTO: 4.3 K/UL — SIGNIFICANT CHANGE UP (ref 1.8–7.4)
NEUTROPHILS NFR BLD AUTO: 63.4 % — SIGNIFICANT CHANGE UP (ref 43–77)
PLATELET # BLD AUTO: 192 K/UL — SIGNIFICANT CHANGE UP (ref 150–400)
POTASSIUM SERPL-MCNC: 4.1 MMOL/L — SIGNIFICANT CHANGE UP (ref 3.5–5.3)
POTASSIUM SERPL-SCNC: 4.1 MMOL/L — SIGNIFICANT CHANGE UP (ref 3.5–5.3)
PROTHROM AB SERPL-ACNC: 11.6 SEC — SIGNIFICANT CHANGE UP (ref 9.8–12.7)
RBC # BLD: 4.72 M/UL — SIGNIFICANT CHANGE UP (ref 3.8–5.2)
RBC # FLD: 12.1 % — SIGNIFICANT CHANGE UP (ref 10.3–14.5)
SODIUM SERPL-SCNC: 137 MMOL/L — SIGNIFICANT CHANGE UP (ref 135–145)
TROPONIN T SERPL-MCNC: <0.01 NG/ML — SIGNIFICANT CHANGE UP (ref 0–0.06)
TROPONIN T SERPL-MCNC: <0.01 NG/ML — SIGNIFICANT CHANGE UP (ref 0–0.06)
WBC # BLD: 6.8 K/UL — SIGNIFICANT CHANGE UP (ref 3.8–10.5)
WBC # FLD AUTO: 6.8 K/UL — SIGNIFICANT CHANGE UP (ref 3.8–10.5)

## 2018-04-24 PROCEDURE — 93010 ELECTROCARDIOGRAM REPORT: CPT

## 2018-04-24 PROCEDURE — 71045 X-RAY EXAM CHEST 1 VIEW: CPT | Mod: 26

## 2018-04-24 PROCEDURE — 93298 REM INTERROG DEV EVAL SCRMS: CPT

## 2018-04-24 PROCEDURE — 99220: CPT

## 2018-04-24 PROCEDURE — 99285 EMERGENCY DEPT VISIT HI MDM: CPT

## 2018-04-24 RX ORDER — SODIUM CHLORIDE 9 MG/ML
3 INJECTION INTRAMUSCULAR; INTRAVENOUS; SUBCUTANEOUS ONCE
Qty: 0 | Refills: 0 | Status: COMPLETED | OUTPATIENT
Start: 2018-04-24 | End: 2018-04-24

## 2018-04-24 RX ADMIN — SODIUM CHLORIDE 3 MILLILITER(S): 9 INJECTION INTRAMUSCULAR; INTRAVENOUS; SUBCUTANEOUS at 13:26

## 2018-04-24 RX ADMIN — Medication 1 MILLIGRAM(S): at 22:15

## 2018-04-24 NOTE — ED PROVIDER NOTE - PROGRESS NOTE DETAILS
ED mathew unremarkable. Spoke with Dr. Orta's NP. Spoke with EPS here. To be evaluated. Attending MD Del Rosario.  Pt signed out to me in stable condition pending EP consult recs, scheduled for vagal nerve proc in May, loop recorder with patria arrhythmia. Luisa PGY3: feeling short of breath and anxious, hr in the 130s. will obtain ekg and alert EP team. Attending MD.  EP made aware of concern for tachy patria syndrome.  Pt remains hemodynamically stable but repeat EKG with several pauses following tachycardia.  EP aware. Attending MD BOOTH.  PT seen by EP who are recommending 8 hr trop cycle and EP monitoring overnight.  PT scheduled for cath/ablation next month.  EP following.  PT stable for tx to CDU overnight for Tachy/patria dysrythmia.

## 2018-04-24 NOTE — CONSULT NOTE ADULT - ATTENDING COMMENTS
sinus node dysfunction and intermittent AT versus inappropriate sinus tachycardia. bradycardia does not explain her severe symptoms.

## 2018-04-24 NOTE — ED CDU PROVIDER INITIAL DAY NOTE - OBJECTIVE STATEMENT
46y/o F with PMH SVT s/p ablation, bradycardia, and migraines c/o CP x 1 day. pt has left-sided CP, non-radiating, pressure-like, onset after climbing flight of stairs at approx 1030am, constant, without relieving factor and a/w SOB, nausea, and dizziness/lightheadedness. Pt felt as if she was going to pass out. States she felt she had these symptoms 2/2 anxiety. Pt has had similar episodes in the past for quite some time; had been tachycardic in the past, now bradycardic; has a loop recorder and scheduled for EPS intervention next month.  In the ED, EP saw pt, interrogated loop recorder - no adjustment needed, trops negative x 2. Pt sent to CDU for continued telemetry.     Cardio Dr. Orta  PMD Dr. JEANNIE Adkins 46y/o F with PMH SVT s/p ablation, bradycardia, and migraines c/o CP x 1 day. pt has left-sided CP, non-radiating, pressure-like, onset after climbing flight of stairs at approx 1030am, constant, without relieving factor associated with palpitations. States she felt she had these symptoms 2/2 anxiety. Pt also had SOB, nausea, dizziness/lightheadedness, decreased vision, and weakness. Pt felt as if she was going to pass out. Pt has had similar episodes in the past for quite some time; had been tachycardic in the past, now bradycardic; has a loop recorder and scheduled for EPS intervention next month.  In the ED, EP saw pt, interrogated loop recorder - no adjustment needed, trops negative x 2. Pt sent to CDU for continued telemetry.     Cardio Dr. Orta  PMD Dr. JEANNIE Adkins

## 2018-04-24 NOTE — ED PROVIDER NOTE - OBJECTIVE STATEMENT
Left-sided CP, non-radiating, pressure-like, onset after climbing flight of stairs at approx 1030, constant, without relieving factor and a/w SOB, nausea and dizziness. Has had episodes of the same for quite some time; had been tachycardic in the past, now bradycardic; has a loop recorder and scheduled for EPS intervention.

## 2018-04-24 NOTE — ED ADULT NURSE NOTE - OBJECTIVE STATEMENT
48 yo female comes to ED c/o chest pain. Pt has loop recorder and suffers from bradycardia. Scheduled for cath next month. Feels very symptomatic "dizzy headache and diff breathing with pain in my chest". Episodes last 30 min and happens every day. She states during episodes she feels like she is going to pass out and she cannot walk properly. At this time she has chest discomfort, and sob. She denies n/v/d, fevers, chills. She states she is nauseous daily at night time with no emesis. She denies asa use and anticoags. Abd is soft non tender non distended. Skin is warm and dry. Color is appropriate for race. There is NAD. EKG completed. Bedside tele initiated. Family at bedside. Safety and comfort maintained. Will continue to monitor

## 2018-04-24 NOTE — ED CDU PROVIDER INITIAL DAY NOTE - PMH
Migraine  with menstrual cycle  SVT (Supraventricular Tachycardia) Bradycardia    Migraine  with menstrual cycle  SVT (Supraventricular Tachycardia)

## 2018-04-24 NOTE — CONSULT NOTE ADULT - PROBLEM SELECTOR RECOMMENDATION 9
Tele: Currently shows SR with heart rate   3/2018 ECHO: Normal left ventricular internal dimensions and wall  thicknesses. Hyperdynamic left ventricular systolic function.  First set of CE x 1, neg. complete 2nd set in 8 hours  Loop interrogated - See computer for official report. Tele: Currently shows SR with heart rate   3/2018 ECHO: Normal left ventricular internal dimensions and wall  thicknesses. Hyperdynamic left ventricular systolic function.  First set of CE x 1, neg. complete 2nd set in 8 hours  Loop interrogated - See computer for official report.  Plan discussed with Dr. Guthrie Continue to monitor telemetry for now.  Pt scheduled for EP study possible catheter ablation next month with Dr. Guthrie. Tele: Currently shows SR with heart rate   3/2018 ECHO: Normal left ventricular internal dimensions and wall  thicknesses. Hyperdynamic left ventricular systolic function.  Not on AV karina blockers  First set of CE x 1, neg. complete 2nd set in 8 hours  Loop interrogated - See computer for official report.  Plan discussed with Dr. Guthrie Continue to monitor telemetry for now.  Pt scheduled for EP study possible catheter ablation next month with Dr. Guthrie.

## 2018-04-24 NOTE — CONSULT NOTE ADULT - SUBJECTIVE AND OBJECTIVE BOX
CHIEF COMPLAINT:    HISTORY OF PRESENT ILLNESS:      Allergies    bacitracin (Rash)    Intolerances    	    MEDICATIONS:                  PAST MEDICAL & SURGICAL HISTORY:  Migraine: with menstrual cycle  SVT (Supraventricular Tachycardia)   delivery delivered: 16 years ago      FAMILY HISTORY:      SOCIAL HISTORY:    [ ] Non-smoker  [ ] Smoker  [ ] Alcohol      REVIEW OF SYSTEMS:  See HPI. Otherwise, 10 point ROS done and otherwise negative.    PHYSICAL EXAM:  T(C): 36.8 (18 @ 15:51), Max: 37.1 (18 @ 12:04)  HR: 58 (18 @ 15:51) (58 - 65)  BP: 107/53 (18 @ 15:51) (105/66 - 115/89)  RR: 16 (18 @ 15:51) (16 - 18)  SpO2: 99% (18 @ 15:51) (97% - 100%)  Wt(kg): --  I&O's Summary      Appearance: Normal	  HEENT:   Normal oral mucosa, PERRL, EOMI	  Lymphatic: No lymphadenopathy  Cardiovascular: Normal S1 S2, No JVD, No murmurs, No edema  Respiratory: Lungs clear to auscultation	  Psychiatry: A & O x 3, Mood & affect appropriate  Gastrointestinal:  Soft, Non-tender, + BS	  Skin: No rashes, No ecchymoses, No cyanosis	  Neurologic: Non-focal  Extremities: Normal range of motion, No clubbing, cyanosis or edema  Vascular: Peripheral pulses palpable 2+ bilaterally        LABS:	 	    CBC Full  -  ( 2018 13:29 )  WBC Count : 6.8 K/uL  Hemoglobin : 14.9 g/dL  Hematocrit : 44.6 %  Platelet Count - Automated : 192 K/uL  Mean Cell Volume : 94.5 fl  Mean Cell Hemoglobin : 31.6 pg  Mean Cell Hemoglobin Concentration : 33.4 gm/dL  Auto Neutrophil # : 4.3 K/uL  Auto Lymphocyte # : 1.6 K/uL  Auto Monocyte # : 0.8 K/uL  Auto Eosinophil # : 0.1 K/uL  Auto Basophil # : 0.1 K/uL  Auto Neutrophil % : 63.4 %  Auto Lymphocyte % : 23.3 %  Auto Monocyte % : 11.4 %  Auto Eosinophil % : 1.0 %  Auto Basophil % : 1.0 %        137  |  100  |  10  ----------------------------<  86  4.1   |  27  |  0.94    Ca    10.2      2018 13:29  Mg     1.9             proBNP:   Lipid Profile:   HgA1c:   TSH:       CARDIAC MARKERS:  Troponin T, Serum: <0.01 ng/mL (18 @ 13:29) HISTORY OF PRESENT ILLNESS:  Patient is a 47 year old female with past medical history SVT s/p ablation , post ablation and  migraine headaches. Recently admitted in 2018 for SOB and left sided chest pain, and found with signficant sinus bradycardia and underwent a loop recorder placement. Today presented to ED with complaints of Near syncope accompanied with SOB and left sided chest pains, + headaches and blurred vision. Pt reports her symptoms have been worsening for the last few days with no triggering factors. Denies any smoking, ETOH use or illicit drug use. Denies any syncope, CAD, DM or HTN.        bacitracin (Rash)    Intolerances    	  MEDICATIONS:          PAST MEDICAL & SURGICAL HISTORY:  Migraine: with menstrual cycle  SVT (Supraventricular Tachycardia)   delivery delivered: 16 years ago      FAMILY HISTORY:      SOCIAL HISTORY:    [ ] Non-smoker  [ ] Smoker  [ ] Alcohol      REVIEW OF SYSTEMS:  See HPI. Otherwise, 10 point ROS done and otherwise negative.    PHYSICAL EXAM:  T(C): 36.8 (18 @ 15:51), Max: 37.1 (18 @ 12:04)  HR: 58 (18 @ 15:51) (58 - 65)  BP: 107/53 (18 @ 15:51) (105/66 - 115/89)  RR: 16 (18 @ 15:51) (16 - 18)  SpO2: 99% (18 @ 15:51) (97% - 100%)  Wt(kg): --  I&O's Summary      Appearance: Normal	  Neuro: Alert and oriented x 3  Cardiovascular: Normal S1 S2, No JVD, No murmurs, No edema  Respiratory: Lungs clear to auscultation	  Psychiatry: A & O x 3, Mood & affect appropriate  Gastrointestinal:  Soft, Non-tender, + BS	  Skin: No rashes, No ecchymoses, No cyanosis	  Vascular: Peripheral pulses palpable 2+ bilaterally        LABS:	 	    CBC Full  -  ( 2018 13:29 )  WBC Count : 6.8 K/uL  Hemoglobin : 14.9 g/dL  Hematocrit : 44.6 %  Platelet Count - Automated : 192 K/uL  Mean Cell Volume : 94.5 fl  Mean Cell Hemoglobin : 31.6 pg  Mean Cell Hemoglobin Concentration : 33.4 gm/dL  Auto Neutrophil # : 4.3 K/uL  Auto Lymphocyte # : 1.6 K/uL  Auto Monocyte # : 0.8 K/uL  Auto Eosinophil # : 0.1 K/uL  Auto Basophil # : 0.1 K/uL  Auto Neutrophil % : 63.4 %  Auto Lymphocyte % : 23.3 %  Auto Monocyte % : 11.4 %  Auto Eosinophil % : 1.0 %  Auto Basophil % : 1.0 %        137  |  100  |  10  ----------------------------<  86  4.1   |  27  |  0.94    Ca    10.2      2018 13:29  Mg     1.9             proBNP:   Lipid Profile:   HgA1c:   TSH:       CARDIAC MARKERS:  Troponin T, Serum: <0.01 ng/mL (18 @ 13:29)    < from: Transthoracic Echocardiogram (18 @ 12:32) >  Dimensions:    Normal Values:  LA:     2.7    2.0 - 4.0 cm  Ao:     2.7    2.0 - 3.8 cm  SEPTUM: 0.7    0.6 - 1.2 cm  PWT:    0.8    0.6 - 1.1 cm  LVIDd:  4.4    3.0 - 5.6 cm  LVIDs:  2.3    1.8 - 4.0 cm  Derived variables:  LVMI: 52 g/m2  RWT: 0.36  Fractional short: 48 %  EF (Valenteicholtz): 79 %  ------------------------------------------------------------------------  Observations:  Mitral Valve: Normal mitral valve.  Aortic Valve/Aorta: Normal trileaflet aortic valve. Minimal  aortic regurgitation.  Aortic Root: 2.7 cm.  Left Atrium: Normal left atrium.  LA volume index = 21  cc/m2.  Left Ventricle: Hyperdynamic left ventricularsystolic  function. Normal left ventricular internal dimensions and  wall thicknesses. Normal diastolic function  Right Heart: Normal right atrium. Normal right ventricular  size and function. Normal tricuspid valve. Minimal  tricuspid regurgitation.Normal pulmonic valve. Minimal  pulmonic regurgitation.  Pericardium/Pleura: Normal pericardium with no pericardial  effusion.  Hemodynamic: Estimated right atrial pressure is 8 mm Hg.  Estimated right ventricular systolic pressure equals 26 mm  Hg, assuming right atrial pressure equals 8 mm Hg,  consistent with normal pulmonary pressures.  ------------------------------------------------------------------------  Conclusions:  1. Normal left ventricular internal dimensions and wall  thicknesses.  2. Hyperdynamic left ventricular systolic function.    < end of copied text > HISTORY OF PRESENT ILLNESS:  Patient is a 47 year old female with past medical history SVT s/p ablation , post ablation and  migraine headaches. Recently admitted in 2018 for SOB and left sided chest pain, and found with signficant sinus bradycardia and underwent a loop recorder placement. Today presented to ED with complaints of Near syncope accompanied with SOB and left sided chest pains, + headaches and blurred vision. Pt reports her symptoms have been worsening for the last few days with no triggering factors. Denies any smoking, ETOH use or illicit drug use. Denies any syncope, CAD, DM or HTN.        bacitracin (Rash)    Intolerances    	  MEDICATIONS:  Ativan 1mg PRN for anxiety  [id number] PO daily           PAST MEDICAL & SURGICAL HISTORY:  Migraine: with menstrual cycle  SVT (Supraventricular Tachycardia)   delivery delivered: 16 years ago      FAMILY HISTORY:      SOCIAL HISTORY:    [ ] Non-smoker  [ ] Smoker  [ ] Alcohol      REVIEW OF SYSTEMS:  See HPI. Otherwise, 10 point ROS done and otherwise negative.    PHYSICAL EXAM:  T(C): 36.8 (18 @ 15:51), Max: 37.1 (18 @ 12:04)  HR: 58 (18 @ 15:51) (58 - 65)  BP: 107/53 (18 @ 15:51) (105/66 - 115/89)  RR: 16 (18 @ 15:51) (16 - 18)  SpO2: 99% (18 @ 15:51) (97% - 100%)  Wt(kg): --  I&O's Summary      Appearance: Normal	  Neuro: Alert and oriented x 3  Cardiovascular: Normal S1 S2, No JVD, No murmurs, No edema  Respiratory: Lungs clear to auscultation	  Psychiatry: A & O x 3, Mood & affect appropriate  Gastrointestinal:  Soft, Non-tender, + BS	  Skin: No rashes, No ecchymoses, No cyanosis	  Vascular: Peripheral pulses palpable 2+ bilaterally        LABS:	 	    CBC Full  -  ( 2018 13:29 )  WBC Count : 6.8 K/uL  Hemoglobin : 14.9 g/dL  Hematocrit : 44.6 %  Platelet Count - Automated : 192 K/uL  Mean Cell Volume : 94.5 fl  Mean Cell Hemoglobin : 31.6 pg  Mean Cell Hemoglobin Concentration : 33.4 gm/dL  Auto Neutrophil # : 4.3 K/uL  Auto Lymphocyte # : 1.6 K/uL  Auto Monocyte # : 0.8 K/uL  Auto Eosinophil # : 0.1 K/uL  Auto Basophil # : 0.1 K/uL  Auto Neutrophil % : 63.4 %  Auto Lymphocyte % : 23.3 %  Auto Monocyte % : 11.4 %  Auto Eosinophil % : 1.0 %  Auto Basophil % : 1.0 %        137  |  100  |  10  ----------------------------<  86  4.1   |  27  |  0.94    Ca    10.2      2018 13:29  Mg     1.9             proBNP:   Lipid Profile:   HgA1c:   TSH:       CARDIAC MARKERS:  Troponin T, Serum: <0.01 ng/mL (18 @ 13:29)    < from: Transthoracic Echocardiogram (18 @ 12:32) >  Dimensions:    Normal Values:  LA:     2.7    2.0 - 4.0 cm  Ao:     2.7    2.0 - 3.8 cm  SEPTUM: 0.7    0.6 - 1.2 cm  PWT:    0.8    0.6 - 1.1 cm  LVIDd:  4.4    3.0 - 5.6 cm  LVIDs:  2.3    1.8 - 4.0 cm  Derived variables:  LVMI: 52 g/m2  RWT: 0.36  Fractional short: 48 %  EF (Teicholtz): 79 %  ------------------------------------------------------------------------  Observations:  Mitral Valve: Normal mitral valve.  Aortic Valve/Aorta: Normal trileaflet aortic valve. Minimal  aortic regurgitation.  Aortic Root: 2.7 cm.  Left Atrium: Normal left atrium.  LA volume index = 21  cc/m2.  Left Ventricle: Hyperdynamic left ventricular systolic  function. Normal left ventricular internal dimensions and  wall thicknesses. Normal diastolic function  Right Heart: Normal right atrium. Normal right ventricular  size and function. Normal tricuspid valve. Minimal  tricuspid regurgitation.Normal pulmonic valve. Minimal  pulmonic regurgitation.  Pericardium/Pleura: Normal pericardium with no pericardial  effusion.  Hemodynamic: Estimated right atrial pressure is 8 mm Hg.  Estimated right ventricular systolic pressure equals 26 mm  Hg, assuming right atrial pressure equals 8 mm Hg,  consistent with normal pulmonary pressures.  ------------------------------------------------------------------------  Conclusions:  1. Normal left ventricular internal dimensions and wall  thicknesses.  2. Hyperdynamic left ventricular systolic function. HISTORY OF PRESENT ILLNESS:  Patient is a 47 year old female with past medical history SVT s/p ablation , post ablation and  migraine headaches. Recently admitted in 2018 for SOB and left sided chest pain, and found with signficant sinus bradycardia and underwent a loop recorder placement. Today presented to ED with complaints of Near syncope accompanied with SOB and left sided chest pains, + headaches and blurred vision. Pt reports her symptoms have been worsening for the last few days with no triggering factors. Not on any AV karina blockers. Denies any smoking, ETOH use or illicit drug use. Denies any syncope, CAD, DM or HTN.        bacitracin (Rash)    Intolerances    	  MEDICATIONS:  Ativan 1mg PRN for anxiety  [id number] PO daily           PAST MEDICAL & SURGICAL HISTORY:  Migraine: with menstrual cycle  SVT (Supraventricular Tachycardia)   delivery delivered: 16 years ago      FAMILY HISTORY:      SOCIAL HISTORY:    [ ] Non-smoker  [ ] Smoker  [ ] Alcohol      REVIEW OF SYSTEMS:  See HPI. Otherwise, 10 point ROS done and otherwise negative.    PHYSICAL EXAM:  T(C): 36.8 (18 @ 15:51), Max: 37.1 (18 @ 12:04)  HR: 58 (18 @ 15:51) (58 - 65)  BP: 107/53 (-18 @ 15:51) (105/66 - 115/89)  RR: 16 (18 @ 15:51) (16 - 18)  SpO2: 99% (18 @ 15:51) (97% - 100%)  Wt(kg): --  I&O's Summary      Appearance: Normal	  Neuro: Alert and oriented x 3  Cardiovascular: Normal S1 S2, No JVD, No murmurs, No edema  Respiratory: Lungs clear to auscultation	  Psychiatry: A & O x 3, Mood & affect appropriate  Gastrointestinal:  Soft, Non-tender, + BS	  Skin: No rashes, No ecchymoses, No cyanosis	  Vascular: Peripheral pulses palpable 2+ bilaterally        LABS:	 	    CBC Full  -  ( 2018 13:29 )  WBC Count : 6.8 K/uL  Hemoglobin : 14.9 g/dL  Hematocrit : 44.6 %  Platelet Count - Automated : 192 K/uL  Mean Cell Volume : 94.5 fl  Mean Cell Hemoglobin : 31.6 pg  Mean Cell Hemoglobin Concentration : 33.4 gm/dL  Auto Neutrophil # : 4.3 K/uL  Auto Lymphocyte # : 1.6 K/uL  Auto Monocyte # : 0.8 K/uL  Auto Eosinophil # : 0.1 K/uL  Auto Basophil # : 0.1 K/uL  Auto Neutrophil % : 63.4 %  Auto Lymphocyte % : 23.3 %  Auto Monocyte % : 11.4 %  Auto Eosinophil % : 1.0 %  Auto Basophil % : 1.0 %        137  |  100  |  10  ----------------------------<  86  4.1   |  27  |  0.94    Ca    10.2      2018 13:29  Mg     1.9             proBNP:   Lipid Profile:   HgA1c:   TSH:       CARDIAC MARKERS:  Troponin T, Serum: <0.01 ng/mL (18 @ 13:29)    < from: Transthoracic Echocardiogram (18 @ 12:32) >  Dimensions:    Normal Values:  LA:     2.7    2.0 - 4.0 cm  Ao:     2.7    2.0 - 3.8 cm  SEPTUM: 0.7    0.6 - 1.2 cm  PWT:    0.8    0.6 - 1.1 cm  LVIDd:  4.4    3.0 - 5.6 cm  LVIDs:  2.3    1.8 - 4.0 cm  Derived variables:  LVMI: 52 g/m2  RWT: 0.36  Fractional short: 48 %  EF (Teicholtz): 79 %  ------------------------------------------------------------------------  Observations:  Mitral Valve: Normal mitral valve.  Aortic Valve/Aorta: Normal trileaflet aortic valve. Minimal  aortic regurgitation.  Aortic Root: 2.7 cm.  Left Atrium: Normal left atrium.  LA volume index = 21  cc/m2.  Left Ventricle: Hyperdynamic left ventricular systolic  function. Normal left ventricular internal dimensions and  wall thicknesses. Normal diastolic function  Right Heart: Normal right atrium. Normal right ventricular  size and function. Normal tricuspid valve. Minimal  tricuspid regurgitation.Normal pulmonic valve. Minimal  pulmonic regurgitation.  Pericardium/Pleura: Normal pericardium with no pericardial  effusion.  Hemodynamic: Estimated right atrial pressure is 8 mm Hg.  Estimated right ventricular systolic pressure equals 26 mm  Hg, assuming right atrial pressure equals 8 mm Hg,  consistent with normal pulmonary pressures.  ------------------------------------------------------------------------  Conclusions:  1. Normal left ventricular internal dimensions and wall  thicknesses.  2. Hyperdynamic left ventricular systolic function.

## 2018-04-24 NOTE — CONSULT NOTE ADULT - ASSESSMENT
Patient is a 47 year old female with past medical history SVT s/p ablation 2012, post ablation and  migraine headaches. Recently admitted in March 2018 for SOB and left sided chest pain, and found with signficant sinus bradycardia and underwent a loop recorder placement. Today presented to ED with complaints of Near syncope accompanied with SOB and left sided chest pains, + headaches and blurred vision. While in ED at approximately 7pm, complained of SOB, On telemetry an episode of Atrial tachycardia with 's followed by sinus bradycardia. Pt now admitted to medicine team. Patient is a 47 year old female with past medical history SVT s/p ablation 2012, post ablation and  migraine headaches. Recently admitted in March 2018 for SOB and left sided chest pain, and found with signficant sinus bradycardia and underwent a loop recorder placement. Today presented to ED with complaints of Near syncope accompanied with SOB and left sided chest pains, + headaches and blurred vision. While in ED at approximately 7pm, complained of SOB and anxiety, On telemetry an episode of Atrial tachycardia with 's followed by sinus bradycardia. Pt now admitted to medicine team. Patient is a 47 year old female with past medical history SVT s/p ablation 2012, post ablation and  migraine headaches. Recently admitted in March 2018 for SOB and left sided chest pain, and found with signficant sinus bradycardia and underwent a loop recorder placement. Today presented to ED with complaints of Near syncope accompanied with SOB and left sided chest pains, + headaches and blurred vision. While in ED at approximately 7pm, complained of SOB and anxiety, On telemetry an episode of Atrial tachycardia with 's followed by sinus bradycardia. Pt now admitted to  CDU area.

## 2018-04-24 NOTE — ED CDU PROVIDER INITIAL DAY NOTE - ATTENDING CONTRIBUTION TO CARE
Attending MD BOOTH.  PT seen by EP who are recommending 8 hr trop cycle and EP monitoring overnight.  PT scheduled for cath/ablation next month.  EP following.  PT stable for tx to CDU overnight for Tachy/patria dysrythmia.

## 2018-04-24 NOTE — ED PROVIDER NOTE - MEDICAL DECISION MAKING DETAILS
Presentation c/w her established history. Nothing clinically evident to suggest any alternative emergent process.

## 2018-04-24 NOTE — ED ADULT TRIAGE NOTE - BP NONINVASIVE DIASTOLIC (MM HG)
67 Attending MD Rubi.  Agree with above.  P geneva a 61 yr old male with hx of HLD and recent R hip replacement on 10/9 who presents to ED acutely SOB today.  No CP but SOB is assoc with light-headedness.  He felt he would ‘pass out’.  Pt was d/c’d on Monday.  Tuesday night he had a fever to 102 and went back to hospital and was told it is ‘normal’ to have a fever. Pt was told to take Percocet.  Currently afebrile.  No cough but pt feels ‘something is stuck in his throat’.  Pt had spinal anesthesia only without intubation for the procedure.  Pt was coughing prior to the surgery with ‘some blood in it’.  Pt is a cigar smoker but has not smoked now in 2 wks.  No known pulm hx.  Pt has been on Attending MD Rubi.  Agree with above.  P geneva a 61 yr old male with hx of HLD and recent R hip replacement on 10/9 who presents to ED acutely SOB today.  No CP but SOB is assoc with light-headedness.  He felt he would ‘pass out’.  Pt was d/c’d on Monday.  Tuesday night he had a fever to 102 and went back to hospital and was told it is ‘normal’ to have a fever. Pt was told to take Percocet.  Currently afebrile.  No cough but pt feels ‘something is stuck in his throat’.  Pt had spinal anesthesia only without intubation for the procedure.  Pt was coughing prior to the surgery with ‘some blood in it’.  Pt is a cigar smoker but has not smoked now in 2 wks.  No known pulm hx.  Posterior oropharynx clear.  Breath sounds clear bilaterally.  Pt is very anxious and is 100% on 3L NC.  Was 93% ORA.  No known hx of COPD.  Pt endorsing intermittently light-headedness.  Surgeon who performed R hip surg has been notified.  No LE edema bilaterally.  Surg site well healed, no erythema, no drainage.  Pt is making urine.  Pt had recent procedure, tachy to 101 on arrival.  Pt with limited ambulation since procedure.  Concern for PE vs. PNA vs. unclear etiology.   No recent travel outside the country.

## 2018-04-25 VITALS — DIASTOLIC BLOOD PRESSURE: 69 MMHG | TEMPERATURE: 99 F | HEART RATE: 667 BPM | SYSTOLIC BLOOD PRESSURE: 102 MMHG

## 2018-04-25 DIAGNOSIS — R00.1 BRADYCARDIA, UNSPECIFIED: ICD-10-CM

## 2018-04-25 DIAGNOSIS — I47.1 SUPRAVENTRICULAR TACHYCARDIA: ICD-10-CM

## 2018-04-25 PROCEDURE — 80048 BASIC METABOLIC PNL TOTAL CA: CPT

## 2018-04-25 PROCEDURE — 85027 COMPLETE CBC AUTOMATED: CPT

## 2018-04-25 PROCEDURE — 71045 X-RAY EXAM CHEST 1 VIEW: CPT

## 2018-04-25 PROCEDURE — G0378: CPT

## 2018-04-25 PROCEDURE — 85730 THROMBOPLASTIN TIME PARTIAL: CPT

## 2018-04-25 PROCEDURE — 99217: CPT

## 2018-04-25 PROCEDURE — 80061 LIPID PANEL: CPT

## 2018-04-25 PROCEDURE — 84484 ASSAY OF TROPONIN QUANT: CPT

## 2018-04-25 PROCEDURE — 82553 CREATINE MB FRACTION: CPT

## 2018-04-25 PROCEDURE — 93005 ELECTROCARDIOGRAM TRACING: CPT

## 2018-04-25 PROCEDURE — 85610 PROTHROMBIN TIME: CPT

## 2018-04-25 PROCEDURE — 83036 HEMOGLOBIN GLYCOSYLATED A1C: CPT

## 2018-04-25 PROCEDURE — 99284 EMERGENCY DEPT VISIT MOD MDM: CPT | Mod: 25

## 2018-04-25 PROCEDURE — 83735 ASSAY OF MAGNESIUM: CPT

## 2018-04-25 RX ORDER — ESCITALOPRAM OXALATE 10 MG/1
10 TABLET, FILM COATED ORAL DAILY
Qty: 0 | Refills: 0 | Status: DISCONTINUED | OUTPATIENT
Start: 2018-04-25 | End: 2018-04-28

## 2018-04-25 RX ORDER — FLUTICASONE PROPIONATE 50 MCG
1 SPRAY, SUSPENSION NASAL
Qty: 0 | Refills: 0 | COMMUNITY

## 2018-04-25 RX ORDER — ASPIRIN/CALCIUM CARB/MAGNESIUM 324 MG
324 TABLET ORAL ONCE
Qty: 0 | Refills: 0 | Status: COMPLETED | OUTPATIENT
Start: 2018-04-25 | End: 2018-04-25

## 2018-04-25 RX ORDER — OMEGA-3 ACID ETHYL ESTERS 1 G
2 CAPSULE ORAL
Qty: 0 | Refills: 0 | COMMUNITY

## 2018-04-25 RX ADMIN — Medication 324 MILLIGRAM(S): at 06:51

## 2018-04-25 RX ADMIN — ESCITALOPRAM OXALATE 10 MILLIGRAM(S): 10 TABLET, FILM COATED ORAL at 01:30

## 2018-04-25 NOTE — PROGRESS NOTE ADULT - ASSESSMENT
This is a 48 y/o female with PMH of palpitations, migraine headaches, SVT s/p ablation 2012 from [facility]. Recently admitted in March 2018 for SOB and left sided chest pain, and to have found significant sinus bradycardia s/p loop recorder placement. Patient presented to ED on 4/24/18 with complaints of near syncope accompanied with SOB and left sided chest pains, + headaches and blurred vision. While in ED at approximately 7pm, complained of SOB and anxiety, On telemetry an episode of Atrial tachycardia with 's followed by sinus bradycardia. Pt overnight with tachy episodes to 130 while going to bathroom and patria to 50s. Patient with known dual AV node physiology but no AVNRT and hyperdynamic LV systolic function.

## 2018-04-25 NOTE — ED ADULT NURSE REASSESSMENT NOTE - NS ED NURSE REASSESS COMMENT FT1
Patient sleeping in bed comfortably at this time. She is a&o x3. She is awaiting dispo information and plan. VSS/ NAD.  denies complaints at this time. Will continue to monitor.
Pt sleeping in bed at this time. She denies complaints. States "I just feel tired". VSS/ NAD. Family at bedside. Will continue to monitor
pt experiencing acute onset sob and anxiety. spo2 >100% on room air. patient tachycardic. md gaines aware. repeat ekg completed. pt in NAD. VSS
15.45 hrs pt feeling better Reviewed by DR otero Pt discharged  ML out  pt stable togo home  PATY Allison  explained the pt follow up care  & gave the discharge instructions  pt has steady gait  stable vials   pt denies N/V/D fever chills  CP SOB  stable to go home
As per war room. Pt had an episode of SVT while walking to the bathroom. Pt also endorses SOB and feeling like she cant catch her breath. After lying in bed, pt had episode of chest discomfort described as a heaviness and mid back pain rated at 4/10. To be medicated as per MAR. PATY Lazo aware. Cardiology contacted. Will continue to monitor.
Pt received from SÁNCHEZ Jasmine. Pt oriented to CDU & plan of care was discussed. Pt denies any chest pain, SOB, dizziness or palpitations at the moment. Safety & comfort measures maintained. Call bell in reach. Will continue to monitor.
07.00 am Received report from SÁNCHEZ Zepeda   07.30 Pt reassessed . Pt is Awake. Tayler N/V/D fever chills Cp sob vital signs stable. Pt looks comfortable   Safety & comfort measures maintained. Call bell in reach. Will continue to monitor. IV No signs of infiltration noted  Pt is  awaiting for CDU Eval  & Decision by the MD at 9 am

## 2018-04-25 NOTE — ED CDU PROVIDER DISPOSITION NOTE - CLINICAL COURSE
48y/o F with PMH SVT s/p ablation, bradycardia, and migraines c/o CP x 1 day. pt has left-sided CP, non-radiating, pressure-like, onset after climbing flight of stairs at approx 1030am, constant, without relieving factor and a/w SOB, nausea, and dizziness/lightheadedness. Pt felt as if she was going to pass out. States she felt she had these symptoms 2/2 anxiety. Pt has had similar episodes in the past for quite some time; had been tachycardic in the past, now bradycardic; has a loop recorder and scheduled for EPS intervention next month.  In the ED, EP saw pt, interrogated loop recorder - no adjustment needed, trops negative x 2. Pt sent to CDU for continued telemetry. In CDU, pt sinus patria on telemetry. 48y/o F with PMH SVT s/p ablation, bradycardia, and migraines c/o CP x 1 day. pt has left-sided CP, non-radiating, pressure-like, onset after climbing flight of stairs at approx 1030am, constant, without relieving factor and a/w SOB, nausea, and dizziness/lightheadedness. Pt felt as if she was going to pass out. States she felt she had these symptoms 2/2 anxiety. Pt has had similar episodes in the past for quite some time; had been tachycardic in the past, now bradycardic; has a loop recorder and scheduled for EPS intervention next month.  In the ED, EP saw pt, interrogated loop recorder - no adjustment needed, trops negative x 2. Pt sent to CDU for continued telemetry. In CDU, pt sinus patria on telemetry. Patient with additional episode of SVT that resolved on own. Patient seen and evaluated by EP NP and Attending, recommending no acute intervention and outpatient f/u. Patient to keep scheduled ablation next month. Patient stable for d/c.

## 2018-04-25 NOTE — ED CDU PROVIDER SUBSEQUENT DAY NOTE - HISTORY
No interval changes since initial CDU provider note. Pt feels well without complaint. states CP, lightheadedness, and anxiety resolved. NAD VSS. sinus patria on tele. will continue monitoring. - PATY Car

## 2018-04-25 NOTE — ED CDU PROVIDER DISPOSITION NOTE - PLAN OF CARE
1. Recommend Aspirin 81mg over the counter daily until further evaluation.  Take all of your other medications as previously prescribed.   2. Follow up with your PMD and cardiologist within 48-72 hours. Show copies of your reports given to you.   3. Worsening or continued chest pain, shortness of breath, weakness, return to ED. 1. Stay well hydrated. Rest. Take all of your medications as previously prescribed.   2. Follow up with your Primary Care Provider, Cardiologist Dr. Orta, and EP Dr. Guthrie upon discharge within 48-72 hours. Show copies of your reports given to you.   3. Return to ER for any new or worsening chest pain, shortness of breath, weakness, dizziness, passing out, or any other concerns.

## 2018-04-25 NOTE — ED ADULT NURSE REASSESSMENT NOTE - COMFORT CARE
plan of care explained/po fluids offered
plan of care explained/side rails up/repositioned/wait time explained

## 2018-04-25 NOTE — PROGRESS NOTE ADULT - SUBJECTIVE AND OBJECTIVE BOX
1) Indication for loop recorder interrogation: c/o Near syncope   2) Presenting rhythm: V- sensed  3) Stored data revealed 6 episodes of nocturnal sinus bradycardia with heart rates 31- 55 bpm. No episodes to correlates with reported symptoms.     4) AT/AF burden: 0.0%  5) Changes made: none
24H hour events:   Comfortable in bed    MEDICATIONS:  escitalopram 10 milliGRAM(s) Oral daily      REVIEW OF SYSTEMS:  Constitutional: in NAD, well developed  Neuro: no dizziness or lightheadedness  Respiratory: no sob or cough  Cardiac: no chest pain  GI: denies N/V/abdominal pain  : no dysuria  Ext: no musculoskeletal pain    PHYSICAL EXAM:  T(C): 37 (04-25-18 @ 07:35), Max: 37.1 (04-24-18 @ 12:04)  HR: 63 (04-25-18 @ 09:36) (58 - 139)  BP: 108/71 (04-25-18 @ 09:36) (97/64 - 139/87)  RR: 18 (04-25-18 @ 07:35) (16 - 22)  SpO2: 96% (04-25-18 @ 07:35) (96% - 100%)  Wt(kg): --  I&O's Summary      Appearance: Normal	  HEENT:   Normal oral mucosa, PERRL, EOMI	  Lymphatic: No lymphadenopathy  Cardiovascular: Normal S1 S2, No JVD, No murmurs, No edema  Respiratory: Lungs clear to auscultation	  Psychiatry: A & O x 3, Mood & affect appropriate  Gastrointestinal:  Soft, Non-tender, + BS	  Skin: No rashes, No ecchymoses, No cyanosis	  Neurologic: Non-focal  Extremities: Normal range of motion, No clubbing, cyanosis or edema  Vascular: Peripheral pulses palpable 2+ bilaterally        LABS:	 	    CBC Full  -  ( 24 Apr 2018 13:29 )  WBC Count : 6.8 K/uL  Hemoglobin : 14.9 g/dL  Hematocrit : 44.6 %  Platelet Count - Automated : 192 K/uL  Mean Cell Volume : 94.5 fl  Mean Cell Hemoglobin : 31.6 pg  Mean Cell Hemoglobin Concentration : 33.4 gm/dL  Auto Neutrophil # : 4.3 K/uL  Auto Lymphocyte # : 1.6 K/uL  Auto Monocyte # : 0.8 K/uL  Auto Eosinophil # : 0.1 K/uL  Auto Basophil # : 0.1 K/uL  Auto Neutrophil % : 63.4 %  Auto Lymphocyte % : 23.3 %  Auto Monocyte % : 11.4 %  Auto Eosinophil % : 1.0 %  Auto Basophil % : 1.0 %    04-24    137  |  100  |  10  ----------------------------<  86  4.1   |  27  |  0.94    Ca    10.2      24 Apr 2018 13:29  Mg     1.9     04-24      CARDIAC MARKERS:  Troponin T, Serum: <0.01 ng/mL (04-24-18 @ 22:11)  Troponin T, Serum: <0.01 ng/mL (04-24-18 @ 13:29)      TELEMETRY: Sinus Bradycardia/NSR 51-80s

## 2018-04-25 NOTE — ED CDU PROVIDER SUBSEQUENT DAY NOTE - PROGRESS NOTE DETAILS
CDU progress note PATY Car: Patient sleeping comfortably. NAD. VSS. Sinus patria on telemetry. will continue monitoring. CDU NOTE PATY FABIAN: pt went into SVT up to 140's while walking and in bathroom. pt states she was SOB but denies CP/palpitations. states this happens every time she walks. HR then decreased to 100's sinus tachy. several minutes later pt went into NSR while resting and pt stated she did develop some 4/10 moderate chest pressure/heaviness. will give aspirin for CP. cardio called informed of telemetry events, someone from EP will come re-evaluate pt. will continue monitoring. Patient seen and evaluated at bedside, resting comfortably in NAD. Pt reports she feels well at rest. No new episodes of CP, palpitations, SOB. No events on telemetry over last 2 hours. VSS. Pending eval by cardiology. Spoke with EP NP taking over care of patient, reports patient will be seen in CDU. Patient seen and evaluated at bedside, resting comfortably in NAD. Pt reports she feels well at rest. No new episodes of CP, palpitations, SOB. No events on telemetry over last 2 hours. VSS. Pending eval by cardiology. Of note, patient reports scheduled ablation on 5/22. Attn- pt asympt now - had episode of palp when walked to bathroom earlier.  Pt NSR in 50s now.  Pt to be seen by EP Cardiology for recommendations. Patient seen and evaluated at bedside, resting comfortably in NAD. Pt reports she feels well at rest. No new episodes of CP, palpitations, SOB. No events on telemetry over last 2 hours, patient bradycardic in 50s. VSS. Pending eval by cardiology. Of note, patient reports scheduled ablation on 5/22. EP at bedside. EP NP at bedside, will d/w attending Dr. Guthrie and call back with plan. Likely d/c patient today. Patient with onset of dizziness and chest pressure while lying down in stretcher at 0945. VSS. No events on telemetry and repeat EKG unchanged. EP NP now at bedside, will d/w attending Dr. Guthrie and call back with plan. Likely d/c patient today. [facility], now reporting chest pressure and SOB similar to previous episodes. No events on telemetry, patient bradycardic to 56bpm. Will notify EP cards. Patient returned to stretcher after going to bathroom, now reporting chest pressure and SOB similar to previous episodes. No events on telemetry, patient bradycardic to 56bpm. EP NP made aware. Still pending final recs from attending. Patient evaluated by EP NP and attending in CDU. No acute intervention necessary at this time. Patient to keep scheduled ablation next month. VSS. Patient ambulatory with HR max 100 bpm on tele. Patient stable for discharge to f/u with PCP and Cardiology as outpatient. D/w Dr. Pina.

## 2018-05-21 ENCOUNTER — OUTPATIENT (OUTPATIENT)
Dept: OUTPATIENT SERVICES | Facility: HOSPITAL | Age: 48
LOS: 1 days | End: 2018-05-21
Payer: COMMERCIAL

## 2018-05-21 VITALS
HEART RATE: 45 BPM | HEIGHT: 70 IN | TEMPERATURE: 98 F | DIASTOLIC BLOOD PRESSURE: 68 MMHG | WEIGHT: 175.05 LBS | RESPIRATION RATE: 18 BRPM | SYSTOLIC BLOOD PRESSURE: 103 MMHG | OXYGEN SATURATION: 100 %

## 2018-05-21 DIAGNOSIS — I47.1 SUPRAVENTRICULAR TACHYCARDIA: ICD-10-CM

## 2018-05-21 DIAGNOSIS — Z01.818 ENCOUNTER FOR OTHER PREPROCEDURAL EXAMINATION: ICD-10-CM

## 2018-05-21 LAB
ALBUMIN SERPL ELPH-MCNC: 4 G/DL — SIGNIFICANT CHANGE UP (ref 3.3–5)
ALP SERPL-CCNC: 74 U/L — SIGNIFICANT CHANGE UP (ref 40–120)
ALT FLD-CCNC: 13 U/L — SIGNIFICANT CHANGE UP (ref 10–45)
APTT BLD: 30.9 SEC — SIGNIFICANT CHANGE UP (ref 27.5–37.4)
AST SERPL-CCNC: 17 U/L — SIGNIFICANT CHANGE UP (ref 10–40)
BILIRUB SERPL-MCNC: 0.4 MG/DL — SIGNIFICANT CHANGE UP (ref 0.2–1.2)
BLD GP AB SCN SERPL QL: NEGATIVE — SIGNIFICANT CHANGE UP
BUN SERPL-MCNC: 9 MG/DL — SIGNIFICANT CHANGE UP (ref 7–23)
CALCIUM SERPL-MCNC: 9.8 MG/DL — SIGNIFICANT CHANGE UP (ref 8.4–10.5)
CHLORIDE SERPL-SCNC: 107 MMOL/L — SIGNIFICANT CHANGE UP (ref 96–108)
CO2 SERPL-SCNC: 28 MMOL/L — SIGNIFICANT CHANGE UP (ref 22–31)
CREAT SERPL-MCNC: 0.96 MG/DL — SIGNIFICANT CHANGE UP (ref 0.5–1.3)
GLUCOSE SERPL-MCNC: 74 MG/DL — SIGNIFICANT CHANGE UP (ref 70–99)
HCT VFR BLD CALC: 41.5 % — SIGNIFICANT CHANGE UP (ref 34.5–45)
HGB BLD-MCNC: 13.5 G/DL — SIGNIFICANT CHANGE UP (ref 11.5–15.5)
INR BLD: 1.09 RATIO — SIGNIFICANT CHANGE UP (ref 0.88–1.16)
MCHC RBC-ENTMCNC: 30.7 PG — SIGNIFICANT CHANGE UP (ref 27–34)
MCHC RBC-ENTMCNC: 32.5 GM/DL — SIGNIFICANT CHANGE UP (ref 32–36)
MCV RBC AUTO: 94.4 FL — SIGNIFICANT CHANGE UP (ref 80–100)
PLATELET # BLD AUTO: 179 K/UL — SIGNIFICANT CHANGE UP (ref 150–400)
POTASSIUM SERPL-MCNC: 4.2 MMOL/L — SIGNIFICANT CHANGE UP (ref 3.5–5.3)
POTASSIUM SERPL-SCNC: 4.2 MMOL/L — SIGNIFICANT CHANGE UP (ref 3.5–5.3)
PROT SERPL-MCNC: 7.1 G/DL — SIGNIFICANT CHANGE UP (ref 6–8.3)
PROTHROM AB SERPL-ACNC: 11.8 SEC — SIGNIFICANT CHANGE UP (ref 9.8–12.7)
RBC # BLD: 4.4 M/UL — SIGNIFICANT CHANGE UP (ref 3.8–5.2)
RBC # FLD: 11.9 % — SIGNIFICANT CHANGE UP (ref 10.3–14.5)
RH IG SCN BLD-IMP: POSITIVE — SIGNIFICANT CHANGE UP
SODIUM SERPL-SCNC: 135 MMOL/L — SIGNIFICANT CHANGE UP (ref 135–145)
WBC # BLD: 3.8 K/UL — SIGNIFICANT CHANGE UP (ref 3.8–10.5)
WBC # FLD AUTO: 3.8 K/UL — SIGNIFICANT CHANGE UP (ref 3.8–10.5)

## 2018-05-21 PROCEDURE — 86901 BLOOD TYPING SEROLOGIC RH(D): CPT

## 2018-05-21 PROCEDURE — 86900 BLOOD TYPING SEROLOGIC ABO: CPT

## 2018-05-21 PROCEDURE — 80053 COMPREHEN METABOLIC PANEL: CPT

## 2018-05-21 PROCEDURE — 85027 COMPLETE CBC AUTOMATED: CPT

## 2018-05-21 PROCEDURE — 85610 PROTHROMBIN TIME: CPT

## 2018-05-21 PROCEDURE — 93010 ELECTROCARDIOGRAM REPORT: CPT | Mod: 76

## 2018-05-21 PROCEDURE — 85730 THROMBOPLASTIN TIME PARTIAL: CPT

## 2018-05-21 PROCEDURE — 86850 RBC ANTIBODY SCREEN: CPT

## 2018-05-21 NOTE — H&P CARDIOLOGY - VASCULAR
Nonrheumatic aortic valve stenosis
Equal and normal pulses (carotid, femoral, dorsalis pedis)

## 2018-05-21 NOTE — H&P CARDIOLOGY - HISTORY OF PRESENT ILLNESS
47 f h/o SVT s/p ablation, Migraine, history of atrial tachycardia from her magalis terminalis in 10/16/2012. Pt  was found to have dual AV nodes but no AVNRT.  Post procedure she had episodes of sinus tachycardia.     She had had ongoing symptoms since her procedure. She was recently admitted with shortness of breath and left sided chest pain. While admitted she had significant sinus bradycardia, but it was not clear that this was causing her dyspnea. She then underwent placement of an ILR.  Interrogation of her loop today shows a sinus pause at night and a symptoms episode where she has what appears to be either a slow AT or sinus exit block.     Pt is referred for EP study to better define her conduction and catheter ablation for an AT if needed. 47 f h/o SVT s/p ablation, Migraine, history of atrial tachycardia from her magalis terminalis in 10/16/2012. Pt  was found to have dual AV nodes but no AVNRT.  Post procedure she had episodes of sinus tachycardia.     She had had ongoing symptoms since her procedure. She was recently admitted with shortness of breath and left sided chest pain. While admitted she had significant sinus bradycardia, but it was not clear that this was causing her dyspnea. She then underwent placement of an ILR.  Interrogation of her loop today shows a sinus pause at night and a symptoms episode where she has what appears to be either a slow AT or sinus exit block.     Pt is referred for EP study to better define her conduction and catheter ablation for an AT if needed. Pt is scheduled for procedure on 5/22/18

## 2018-05-22 ENCOUNTER — OUTPATIENT (OUTPATIENT)
Dept: INPATIENT UNIT | Facility: HOSPITAL | Age: 48
LOS: 1 days | End: 2018-05-22
Payer: COMMERCIAL

## 2018-05-22 VITALS
RESPIRATION RATE: 17 BRPM | TEMPERATURE: 98 F | HEART RATE: 56 BPM | DIASTOLIC BLOOD PRESSURE: 63 MMHG | SYSTOLIC BLOOD PRESSURE: 101 MMHG | OXYGEN SATURATION: 98 %

## 2018-05-22 VITALS
OXYGEN SATURATION: 99 % | SYSTOLIC BLOOD PRESSURE: 108 MMHG | RESPIRATION RATE: 17 BRPM | TEMPERATURE: 98 F | DIASTOLIC BLOOD PRESSURE: 73 MMHG | HEART RATE: 59 BPM

## 2018-05-22 DIAGNOSIS — I47.1 SUPRAVENTRICULAR TACHYCARDIA: ICD-10-CM

## 2018-05-22 LAB
HCG SERPL QL: NEGATIVE — SIGNIFICANT CHANGE UP
RH IG SCN BLD-IMP: POSITIVE — SIGNIFICANT CHANGE UP

## 2018-05-22 PROCEDURE — C1730: CPT

## 2018-05-22 PROCEDURE — 93653 COMPRE EP EVAL TX SVT: CPT

## 2018-05-22 PROCEDURE — 93005 ELECTROCARDIOGRAM TRACING: CPT

## 2018-05-22 PROCEDURE — 84703 CHORIONIC GONADOTROPIN ASSAY: CPT

## 2018-05-22 PROCEDURE — C1894: CPT

## 2018-05-22 PROCEDURE — 93623 PRGRMD STIMJ&PACG IV RX NFS: CPT

## 2018-05-22 PROCEDURE — 86900 BLOOD TYPING SEROLOGIC ABO: CPT

## 2018-05-22 PROCEDURE — 93621 COMP EP EVL L PAC&REC C SINS: CPT

## 2018-05-22 PROCEDURE — 93010 ELECTROCARDIOGRAM REPORT: CPT

## 2018-05-22 PROCEDURE — 86901 BLOOD TYPING SEROLOGIC RH(D): CPT

## 2018-05-22 PROCEDURE — C1732: CPT

## 2018-05-22 PROCEDURE — 93613 INTRACARDIAC EPHYS 3D MAPG: CPT

## 2018-06-04 ENCOUNTER — CHART COPY (OUTPATIENT)
Age: 48
End: 2018-06-04

## 2018-06-06 ENCOUNTER — CHART COPY (OUTPATIENT)
Age: 48
End: 2018-06-06

## 2018-06-08 ENCOUNTER — APPOINTMENT (OUTPATIENT)
Dept: ELECTROPHYSIOLOGY | Facility: CLINIC | Age: 48
End: 2018-06-08

## 2018-06-08 ENCOUNTER — CHART COPY (OUTPATIENT)
Age: 48
End: 2018-06-08

## 2018-06-11 ENCOUNTER — OTHER (OUTPATIENT)
Age: 48
End: 2018-06-11

## 2018-06-13 ENCOUNTER — APPOINTMENT (OUTPATIENT)
Dept: ELECTROPHYSIOLOGY | Facility: CLINIC | Age: 48
End: 2018-06-13

## 2018-06-13 ENCOUNTER — OUTPATIENT (OUTPATIENT)
Dept: INPATIENT UNIT | Facility: HOSPITAL | Age: 48
LOS: 1 days | End: 2018-06-13
Payer: COMMERCIAL

## 2018-06-13 VITALS
DIASTOLIC BLOOD PRESSURE: 89 MMHG | HEART RATE: 66 BPM | HEIGHT: 70 IN | TEMPERATURE: 99 F | OXYGEN SATURATION: 100 % | RESPIRATION RATE: 16 BRPM | SYSTOLIC BLOOD PRESSURE: 146 MMHG | WEIGHT: 173.06 LBS

## 2018-06-13 DIAGNOSIS — R00.1 BRADYCARDIA, UNSPECIFIED: ICD-10-CM

## 2018-06-13 DIAGNOSIS — Z01.818 ENCOUNTER FOR OTHER PREPROCEDURAL EXAMINATION: ICD-10-CM

## 2018-06-13 DIAGNOSIS — Z98.890 OTHER SPECIFIED POSTPROCEDURAL STATES: Chronic | ICD-10-CM

## 2018-06-13 LAB
ANION GAP SERPL CALC-SCNC: 13 MMOL/L — SIGNIFICANT CHANGE UP (ref 5–17)
BLD GP AB SCN SERPL QL: NEGATIVE — SIGNIFICANT CHANGE UP
BUN SERPL-MCNC: 15 MG/DL — SIGNIFICANT CHANGE UP (ref 7–23)
CALCIUM SERPL-MCNC: 9.1 MG/DL — SIGNIFICANT CHANGE UP (ref 8.4–10.5)
CHLORIDE SERPL-SCNC: 103 MMOL/L — SIGNIFICANT CHANGE UP (ref 96–108)
CO2 SERPL-SCNC: 22 MMOL/L — SIGNIFICANT CHANGE UP (ref 22–31)
CREAT SERPL-MCNC: 0.94 MG/DL — SIGNIFICANT CHANGE UP (ref 0.5–1.3)
GLUCOSE SERPL-MCNC: 78 MG/DL — SIGNIFICANT CHANGE UP (ref 70–99)
HCG SERPL-ACNC: <2 MIU/ML — SIGNIFICANT CHANGE UP
HCT VFR BLD CALC: 42.9 % — SIGNIFICANT CHANGE UP (ref 34.5–45)
HGB BLD-MCNC: 14.4 G/DL — SIGNIFICANT CHANGE UP (ref 11.5–15.5)
MCHC RBC-ENTMCNC: 31.6 PG — SIGNIFICANT CHANGE UP (ref 27–34)
MCHC RBC-ENTMCNC: 33.7 GM/DL — SIGNIFICANT CHANGE UP (ref 32–36)
MCV RBC AUTO: 93.9 FL — SIGNIFICANT CHANGE UP (ref 80–100)
PLATELET # BLD AUTO: 202 K/UL — SIGNIFICANT CHANGE UP (ref 150–400)
POTASSIUM SERPL-MCNC: 5.2 MMOL/L — SIGNIFICANT CHANGE UP (ref 3.5–5.3)
POTASSIUM SERPL-SCNC: 5.2 MMOL/L — SIGNIFICANT CHANGE UP (ref 3.5–5.3)
RBC # BLD: 4.57 M/UL — SIGNIFICANT CHANGE UP (ref 3.8–5.2)
RBC # FLD: 12.1 % — SIGNIFICANT CHANGE UP (ref 10.3–14.5)
RH IG SCN BLD-IMP: POSITIVE — SIGNIFICANT CHANGE UP
SODIUM SERPL-SCNC: 138 MMOL/L — SIGNIFICANT CHANGE UP (ref 135–145)
WBC # BLD: 4.8 K/UL — SIGNIFICANT CHANGE UP (ref 3.8–10.5)
WBC # FLD AUTO: 4.8 K/UL — SIGNIFICANT CHANGE UP (ref 3.8–10.5)

## 2018-06-13 PROCEDURE — 93010 ELECTROCARDIOGRAM REPORT: CPT

## 2018-06-13 RX ORDER — ESCITALOPRAM OXALATE 10 MG/1
10 TABLET, FILM COATED ORAL DAILY
Qty: 0 | Refills: 0 | Status: DISCONTINUED | OUTPATIENT
Start: 2018-06-13 | End: 2018-06-14

## 2018-06-13 RX ORDER — CEFAZOLIN SODIUM 1 G
1000 VIAL (EA) INJECTION EVERY 8 HOURS
Qty: 0 | Refills: 0 | Status: COMPLETED | OUTPATIENT
Start: 2018-06-14 | End: 2018-06-14

## 2018-06-13 NOTE — H&P CARDIOLOGY - HISTORY OF PRESENT ILLNESS
47 f h/o SVT s/p ablation, Migraine, history of atrial tachycardia from her magalis terminalis in 10/16/2012. Pt  was found to have dual AV nodes but no AVNRT.  Post procedure she had episodes of sinus tachycardia.     She had had ongoing symptoms since her procedure. She was recently admitted with shortness of breath and left sided chest pain. While admitted she had significant sinus bradycardia, but it was not clear that this was causing her dyspnea. She then underwent placement of an ILR.  Interrogation of her loop today shows a sinus pause at night and a symptoms episode where she has what appears to be either a slow AT or sinus exit block.     Pt is referred for EP study to better define her conduction and catheter ablation for an AT if needed. Pt is scheduled for procedure on 5/22/18 48 y/o female with hx of Migraine, atrial tachycardia from her magalis terminalis in 10/16/2012 was found to have dual AV nodes but no AVNRT, SVT s/p ablation, bradycardia s/p ILR followed by Dr. ANNETTE Guthrie noted to have sinus pause with some episodes of tachycardia.  Pt presents for Loop recorder explant with PPM implant.  Pt denies symptoms of cp, sob or, lightheadedness or palpitations. 46 y/o female with hx of Migraine, Anxiety, atrial tachycardia from her magalis terminalis in 10/16/2012 was found to have dual AV nodes but no AVNRT, SVT s/p ablation, bradycardia s/p ILR followed by Dr. ANNETTE Guthrie noted to have sinus pause with some episodes of tachycardia.  Pt presents for Loop recorder explant with PPM implant.  Pt denies symptoms of cp, sob or, lightheadedness or palpitations.

## 2018-06-13 NOTE — H&P CARDIOLOGY - PMH
Migraine  with menstrual cycle  SVT (Supraventricular Tachycardia) Bradycardia    Migraine  with menstrual cycle  SVT (Supraventricular Tachycardia) Anxiety    Bradycardia    Migraine  with menstrual cycle  SVT (Supraventricular Tachycardia)

## 2018-06-14 ENCOUNTER — TRANSCRIPTION ENCOUNTER (OUTPATIENT)
Age: 48
End: 2018-06-14

## 2018-06-14 VITALS
DIASTOLIC BLOOD PRESSURE: 76 MMHG | OXYGEN SATURATION: 99 % | SYSTOLIC BLOOD PRESSURE: 103 MMHG | HEART RATE: 61 BPM | TEMPERATURE: 98 F | RESPIRATION RATE: 17 BRPM

## 2018-06-14 LAB
ANION GAP SERPL CALC-SCNC: 10 MMOL/L — SIGNIFICANT CHANGE UP (ref 5–17)
BASOPHILS # BLD AUTO: 0 K/UL — SIGNIFICANT CHANGE UP (ref 0–0.2)
BASOPHILS NFR BLD AUTO: 0.6 % — SIGNIFICANT CHANGE UP (ref 0–2)
BUN SERPL-MCNC: 18 MG/DL — SIGNIFICANT CHANGE UP (ref 7–23)
CALCIUM SERPL-MCNC: 8.5 MG/DL — SIGNIFICANT CHANGE UP (ref 8.4–10.5)
CHLORIDE SERPL-SCNC: 105 MMOL/L — SIGNIFICANT CHANGE UP (ref 96–108)
CO2 SERPL-SCNC: 24 MMOL/L — SIGNIFICANT CHANGE UP (ref 22–31)
CREAT SERPL-MCNC: 1.06 MG/DL — SIGNIFICANT CHANGE UP (ref 0.5–1.3)
EOSINOPHIL # BLD AUTO: 0.1 K/UL — SIGNIFICANT CHANGE UP (ref 0–0.5)
EOSINOPHIL NFR BLD AUTO: 1.5 % — SIGNIFICANT CHANGE UP (ref 0–6)
GLUCOSE SERPL-MCNC: 100 MG/DL — HIGH (ref 70–99)
HCT VFR BLD CALC: 38.1 % — SIGNIFICANT CHANGE UP (ref 34.5–45)
HGB BLD-MCNC: 12.6 G/DL — SIGNIFICANT CHANGE UP (ref 11.5–15.5)
LYMPHOCYTES # BLD AUTO: 1.3 K/UL — SIGNIFICANT CHANGE UP (ref 1–3.3)
LYMPHOCYTES # BLD AUTO: 22 % — SIGNIFICANT CHANGE UP (ref 13–44)
MCHC RBC-ENTMCNC: 30.8 PG — SIGNIFICANT CHANGE UP (ref 27–34)
MCHC RBC-ENTMCNC: 33.1 GM/DL — SIGNIFICANT CHANGE UP (ref 32–36)
MCV RBC AUTO: 93 FL — SIGNIFICANT CHANGE UP (ref 80–100)
MONOCYTES # BLD AUTO: 0.7 K/UL — SIGNIFICANT CHANGE UP (ref 0–0.9)
MONOCYTES NFR BLD AUTO: 11.5 % — SIGNIFICANT CHANGE UP (ref 2–14)
NEUTROPHILS # BLD AUTO: 3.9 K/UL — SIGNIFICANT CHANGE UP (ref 1.8–7.4)
NEUTROPHILS NFR BLD AUTO: 64.3 % — SIGNIFICANT CHANGE UP (ref 43–77)
PLATELET # BLD AUTO: 187 K/UL — SIGNIFICANT CHANGE UP (ref 150–400)
POTASSIUM SERPL-MCNC: 3.7 MMOL/L — SIGNIFICANT CHANGE UP (ref 3.5–5.3)
POTASSIUM SERPL-SCNC: 3.7 MMOL/L — SIGNIFICANT CHANGE UP (ref 3.5–5.3)
RBC # BLD: 4.09 M/UL — SIGNIFICANT CHANGE UP (ref 3.8–5.2)
RBC # FLD: 12 % — SIGNIFICANT CHANGE UP (ref 10.3–14.5)
SODIUM SERPL-SCNC: 139 MMOL/L — SIGNIFICANT CHANGE UP (ref 135–145)
WBC # BLD: 6 K/UL — SIGNIFICANT CHANGE UP (ref 3.8–10.5)
WBC # FLD AUTO: 6 K/UL — SIGNIFICANT CHANGE UP (ref 3.8–10.5)

## 2018-06-14 PROCEDURE — 93005 ELECTROCARDIOGRAM TRACING: CPT

## 2018-06-14 PROCEDURE — 33284: CPT

## 2018-06-14 PROCEDURE — 71046 X-RAY EXAM CHEST 2 VIEWS: CPT

## 2018-06-14 PROCEDURE — 86850 RBC ANTIBODY SCREEN: CPT

## 2018-06-14 PROCEDURE — 33208 INSRT HEART PM ATRIAL & VENT: CPT

## 2018-06-14 PROCEDURE — 85027 COMPLETE CBC AUTOMATED: CPT

## 2018-06-14 PROCEDURE — 71046 X-RAY EXAM CHEST 2 VIEWS: CPT | Mod: 26

## 2018-06-14 PROCEDURE — 93010 ELECTROCARDIOGRAM REPORT: CPT

## 2018-06-14 PROCEDURE — 86901 BLOOD TYPING SEROLOGIC RH(D): CPT

## 2018-06-14 PROCEDURE — 84702 CHORIONIC GONADOTROPIN TEST: CPT

## 2018-06-14 PROCEDURE — 80048 BASIC METABOLIC PNL TOTAL CA: CPT

## 2018-06-14 PROCEDURE — C1898: CPT

## 2018-06-14 PROCEDURE — 86900 BLOOD TYPING SEROLOGIC ABO: CPT

## 2018-06-14 PROCEDURE — C1892: CPT

## 2018-06-14 PROCEDURE — C1785: CPT

## 2018-06-14 RX ADMIN — Medication 100 MILLIGRAM(S): at 09:16

## 2018-06-14 RX ADMIN — Medication 100 MILLIGRAM(S): at 00:03

## 2018-06-14 NOTE — DISCHARGE NOTE ADULT - MEDICATION SUMMARY - MEDICATIONS TO TAKE
I will START or STAY ON the medications listed below when I get home from the hospital:    LORazepam 1 mg oral tablet  -- 1 tab(s) by mouth once a day  -- Indication: For Anxiety    escitalopram 10 mg oral tablet  -- 1 tab(s) by mouth once a day  -- Indication: For Depression

## 2018-06-14 NOTE — DISCHARGE NOTE ADULT - CARE PROVIDER_API CALL
Jen Guthrie), Cardiac Electrophysiology; Cardiovascular Disease  [address], [place] [id number]  Phone: [phone]  Fax: [phone]

## 2018-06-14 NOTE — DISCHARGE NOTE ADULT - PLAN OF CARE
Pt will not have any more episodes of Bradycardia. S/P PPM Implant, follow directions, keep appointments. Your heart rate will be controlled. No lifting of affected arm over your head on the side of pacemaker for 2 weeks. No lifting / pushing more than 10 lbs for 6 weeks. No drIving for 72 hours. Keep area dry for 48 hours, then may shower, but do not scrub the area. No submerging / swimming until the scar is formed. No golf for 6 weeks. Once the wound is completely healed (6 weeks), you can resume normal activities. If you discharged with prescription antibiotics, make sure to take these until the supply is gone.  Continue with follow-up visits to your electrophysiology team and with your home remote device monitoring (if applicable). Continue your medications as prescribed.

## 2018-06-14 NOTE — DISCHARGE NOTE ADULT - CARE PLAN
Principal Discharge DX:	Bradycardia  Goal:	Pt will not have any more episodes of Bradycardia.  Assessment and plan of treatment:	S/P PPM Implant, follow directions, keep appointments. Principal Discharge DX:	Bradycardia  Goal:	Your heart rate will be controlled.  Assessment and plan of treatment:	No lifting of affected arm over your head on the side of pacemaker for 2 weeks. No lifting / pushing more than 10 lbs for 6 weeks. No drIving for 72 hours. Keep area dry for 48 hours, then may shower, but do not scrub the area. No submerging / swimming until the scar is formed. No golf for 6 weeks. Once the wound is completely healed (6 weeks), you can resume normal activities. If you discharged with prescription antibiotics, make sure to take these until the supply is gone.  Continue with follow-up visits to your electrophysiology team and with your home remote device monitoring (if applicable). Continue your medications as prescribed.

## 2018-06-14 NOTE — DISCHARGE NOTE ADULT - HOSPITAL COURSE
48 y/o female with hx of Migraine, Anxiety, atrial tachycardia from her magalis terminalis in 10/16/2012 was found to have dual AV nodes but no AVNRT, SVT s/p ablation, bradycardia s/p ILR followed by Dr. ANNETTE Guthrie noted to have sinus pause with some episodes of tachycardia.  Pt presents for Loop recorder explant with PPM implant.  Pt denies symptoms of cp, sob or, lightheadedness or palpitations.     S/P PPM Implant, Pt tolerated procedure well with no post cath complications and hospitalization remained uneventful. Pt stable for discharge home. 46 y/o female with hx of Migraine, Anxiety, atrial tachycardia from her magalis terminalis in 10/16/2012 was found to have dual AV nodes but no AVNRT, SVT s/p ablation, bradycardia s/p ILR followed by Dr. ANNETTE Guthrie noted to have sinus pause with some episodes of tachycardia.  Pt presents for Loop recorder explant with PPM implant.  Pt denies symptoms of cp, sob or, lightheadedness or palpitations.     S/P PPM Implant, Pt tolerated procedure well with no post cath complications and hospitalization remained uneventful. Pt stable for discharge home.     Chest Xray Negative for Pneumothorax.

## 2018-06-14 NOTE — DISCHARGE NOTE ADULT - PATIENT PORTAL LINK FT
You can access the [facility] Patient Portal, offered by [facility], by registering with the following website: [email or web address]

## 2018-06-14 NOTE — PROGRESS NOTE ADULT - SUBJECTIVE AND OBJECTIVE BOX
INTERVAL HPI/OVERNIGHT EVENTS: Resting in bed c/o slight PPM site pain    MEDICATIONS  (STANDING):  escitalopram 10 milliGRAM(s) Oral daily    MEDICATIONS  (PRN):      Allergies    bacitracin (Rash)    Intolerances      ROS:  General: Pt denies fever/chills    Cardiovascular: denies chest pain/palpitations/leg edema    Respiratory and Thorax: denies SOB/cough/wheezing    Gastrointestinal: denies abdominal pain/diarrhea/constipation/bloody stool    Musculoskeletal: denies joint pain or swelling, denies restricted motion    Skin: denies rashes/sores    Hematologic: denies abnormal bleeding    Vital Signs Last 24 Hrs  T(C): 36.7 (14 Jun 2018 11:29), Max: 37 (13 Jun 2018 13:53)  T(F): 98.1 (14 Jun 2018 11:29), Max: 98.6 (13 Jun 2018 13:53)  HR: 61 (14 Jun 2018 11:29) (60 - 68)  BP: 103/76 (14 Jun 2018 11:29) (92/66 - 146/89)  BP(mean): 108 (13 Jun 2018 13:53) (108 - 108)  RR: 17 (14 Jun 2018 11:29) (16 - 17)  SpO2: 99% (14 Jun 2018 11:29) (97% - 100%)    Physical Exam:  Constitutional: well developed, well nourished,  and no acute distress    Neurological: Alert & Oriented x 3,  no focal deficits    HEENT:   Neck supple.    Respiratory: CTA B/L, No wheezing/crackles/rhonchi    Cardiovascular: (+) S1 & S2, RRR    Gastrointestinal: soft, NT, nondistended, (+) BS    Genitourinary: non distended bladder, voiding freely    Extremities: No pedal edema, No clubbing, No cyanosis    Skin:  normal skin color and pigmentation, no skin lesions          LABS:                        12.6   6.0   )-----------( 187      ( 14 Jun 2018 00:16 )             38.1     06-14    139  |  105  |  18  ----------------------------<  100<H>  3.7   |  24  |  1.06    Ca    8.5      14 Jun 2018 00:16            RADIOLOGY & ADDITIONAL TESTS:    TELE:    EKG: INTERVAL HPI/OVERNIGHT EVENTS: Resting in bed c/o slight PPM site pain. Tele: SR/ A- paced Hr 60- 70's.     MEDICATIONS  (STANDING):  escitalopram 10 milliGRAM(s) Oral daily    MEDICATIONS  (PRN):      Allergies    bacitracin (Rash)    Intolerances      ROS:  General: Pt denies fever/chills  Cardiovascular: denies chest pain/palpitations/leg edema  Respiratory and Thorax: denies SOB/cough/wheezing  Gastrointestinal: denies abdominal pain/diarrhea/constipation/bloody stool  Skin: denies rashes/sores  Hematologic: denies abnormal bleeding    Vital Signs Last 24 Hrs  T(C): 36.7 (14 Jun 2018 11:29), Max: 37 (13 Jun 2018 13:53)  T(F): 98.1 (14 Jun 2018 11:29), Max: 98.6 (13 Jun 2018 13:53)  HR: 61 (14 Jun 2018 11:29) (60 - 68)  BP: 103/76 (14 Jun 2018 11:29) (92/66 - 146/89)  BP(mean): 108 (13 Jun 2018 13:53) (108 - 108)  RR: 17 (14 Jun 2018 11:29) (16 - 17)  SpO2: 99% (14 Jun 2018 11:29) (97% - 100%)        Physical Exam:  Neurological: Alert & Oriented x 3  Respiratory: CTA B/L, No wheezing/crackles/rhonchi  Cardiovascular: (+) S1 & S2, RRR  Gastrointestinal: soft, NT, nondistended, (+) BS  Genitourinary: non distended bladder, voiding freely  Extremities: No pedal edema, No clubbing, No cyanosis  Skin:  normal skin color and pigmentation, no skin lesions  Mid chest wall s/p ILR explant site-  dried blood noted under dermabond. No swelling or active bleeding noted  Left infraclavicular PPM site- dried blood noted under dermabond. Slight swelling noted- pressure dressing applied.           LABS:                        12.6   6.0   )-----------( 187      ( 14 Jun 2018 00:16 )             38.1     06-14    139  |  105  |  18  ----------------------------<  100<H>  3.7   |  24  |  1.06    Ca    8.5      14 Jun 2018 00:16

## 2018-06-14 NOTE — PROGRESS NOTE ADULT - SUBJECTIVE AND OBJECTIVE BOX
Patient is a 47y old  Female who presents for PPM implant and loop recorder explant        Allergies    bacitracin (Rash)    Intolerances        Medications:  ceFAZolin   IVPB 1000 milliGRAM(s) IV Intermittent every 8 hours  escitalopram 10 milliGRAM(s) Oral daily      Vitals:  T(C): 36.8 (18 @ 19:45), Max: 37 (18 @ 13:53)  HR: 60 (18 @ 23:45) (60 - 68)  BP: 109/75 (18 @ 23:45) (92/66 - 146/89)  BP(mean): 108 (18 @ 13:53) (108 - 108)  RR: 16 (18 @ 23:45) (16 - 16)  SpO2: 99% (18 @ 23:45) (99% - 100%)  Wt(kg): --  Daily Height in cm: 177.8 (2018 19:45)    Daily Weight in k.5 (2018 13:53)  I&O's Summary        Physical Exam:  Procedural Access Site: Left anterior chest wall. No hematoma, Non-tender to palpation, 2+ pulse, No bruit, No Ecchymosis  Neurologic: Non-focal  Psychiatry: AAOx3, Mood & affect appropriate  Skin: No rashes, No ecchymoses, No cyanosis        139  |  105  |  18  ----------------------------<  100<H>  3.7   |  24  |  1.06    Ca    8.5      2018 00:16      Interpretation of Telemetry: TOSHA 68bpm'    ASSESSMENT/PLAN  Patient is a 47y old  Female who presents for PPM implant and loop recorder explant. Pt tolerated the procedure well. Site benign. Post-procedure discharge instructions discussed and questions addressed

## 2018-06-14 NOTE — PROGRESS NOTE ADULT - ASSESSMENT
48 y/o female with hx of Migraine, Anxiety, atrial tachycardia from her magalis terminalis in 10/16/2012 was found to have dual AV nodes but no AVNRT, SVT s/p ablation, bradycardia s/p ILR followed by Dr. ANNETTE Guthrie noted to have sinus pause with some episodes of tachycardia.  Pt is now S/P  Loop recorder explant with PPM implant done on 6/13.  - Mid chest wall s/p ILR explant site-  dried blood noted under dermabond. No swelling or active bleeding noted  -Left infraclavicular PPM site- dried blood noted under dermabond. Slight swelling noted- pressure dressing applied.   - Chest X-ray reviewed, lead tips in place, no PTX noted.   -ID card, booklet and post PPM instructions provided  - Discharge planning today, F/U with EP clinic on 6/27 @ 9am for wound/ device check.

## 2018-06-27 ENCOUNTER — OUTPATIENT (OUTPATIENT)
Dept: OUTPATIENT SERVICES | Facility: HOSPITAL | Age: 48
LOS: 1 days | End: 2018-06-27
Payer: COMMERCIAL

## 2018-06-27 ENCOUNTER — APPOINTMENT (OUTPATIENT)
Dept: ELECTROPHYSIOLOGY | Facility: CLINIC | Age: 48
End: 2018-06-27
Payer: COMMERCIAL

## 2018-06-27 ENCOUNTER — NON-APPOINTMENT (OUTPATIENT)
Age: 48
End: 2018-06-27

## 2018-06-27 VITALS
BODY MASS INDEX: 24.91 KG/M2 | HEIGHT: 70 IN | WEIGHT: 174 LBS | HEART RATE: 65 BPM | SYSTOLIC BLOOD PRESSURE: 120 MMHG | OXYGEN SATURATION: 99 % | DIASTOLIC BLOOD PRESSURE: 85 MMHG

## 2018-06-27 DIAGNOSIS — I25.10 ATHEROSCLEROTIC HEART DISEASE OF NATIVE CORONARY ARTERY WITHOUT ANGINA PECTORIS: ICD-10-CM

## 2018-06-27 DIAGNOSIS — Z98.890 OTHER SPECIFIED POSTPROCEDURAL STATES: Chronic | ICD-10-CM

## 2018-06-27 PROCEDURE — 93306 TTE W/DOPPLER COMPLETE: CPT

## 2018-06-27 PROCEDURE — 99024 POSTOP FOLLOW-UP VISIT: CPT

## 2018-06-27 PROCEDURE — 93306 TTE W/DOPPLER COMPLETE: CPT | Mod: 26

## 2018-07-06 ENCOUNTER — APPOINTMENT (OUTPATIENT)
Dept: ELECTROPHYSIOLOGY | Facility: CLINIC | Age: 48
End: 2018-07-06

## 2018-09-26 ENCOUNTER — APPOINTMENT (OUTPATIENT)
Dept: ELECTROPHYSIOLOGY | Facility: CLINIC | Age: 48
End: 2018-09-26
Payer: COMMERCIAL

## 2018-09-26 VITALS — HEART RATE: 77 BPM | OXYGEN SATURATION: 100 % | SYSTOLIC BLOOD PRESSURE: 116 MMHG | DIASTOLIC BLOOD PRESSURE: 79 MMHG

## 2018-09-26 DIAGNOSIS — I49.5 SICK SINUS SYNDROME: ICD-10-CM

## 2018-09-26 PROBLEM — R00.1 BRADYCARDIA, UNSPECIFIED: Chronic | Status: ACTIVE | Noted: 2018-04-25

## 2018-09-26 PROBLEM — F41.9 ANXIETY DISORDER, UNSPECIFIED: Chronic | Status: ACTIVE | Noted: 2018-06-13

## 2018-09-26 PROCEDURE — 93280 PM DEVICE PROGR EVAL DUAL: CPT

## 2018-10-16 ENCOUNTER — APPOINTMENT (OUTPATIENT)
Dept: ELECTROPHYSIOLOGY | Facility: CLINIC | Age: 48
End: 2018-10-16
Payer: COMMERCIAL

## 2018-10-16 VITALS
HEART RATE: 68 BPM | DIASTOLIC BLOOD PRESSURE: 87 MMHG | WEIGHT: 173 LBS | SYSTOLIC BLOOD PRESSURE: 131 MMHG | HEIGHT: 70 IN | BODY MASS INDEX: 24.77 KG/M2 | OXYGEN SATURATION: 99 %

## 2018-10-16 PROCEDURE — 93280 PM DEVICE PROGR EVAL DUAL: CPT

## 2018-12-13 ENCOUNTER — APPOINTMENT (OUTPATIENT)
Dept: ELECTROPHYSIOLOGY | Facility: CLINIC | Age: 48
End: 2018-12-13

## 2019-03-27 ENCOUNTER — APPOINTMENT (OUTPATIENT)
Dept: ELECTROPHYSIOLOGY | Facility: CLINIC | Age: 49
End: 2019-03-27

## 2019-03-27 ENCOUNTER — APPOINTMENT (OUTPATIENT)
Dept: ELECTROPHYSIOLOGY | Facility: CLINIC | Age: 49
End: 2019-03-27
Payer: COMMERCIAL

## 2019-03-27 VITALS
HEART RATE: 73 BPM | HEIGHT: 70 IN | OXYGEN SATURATION: 97 % | SYSTOLIC BLOOD PRESSURE: 122 MMHG | DIASTOLIC BLOOD PRESSURE: 82 MMHG

## 2019-03-27 PROCEDURE — 93280 PM DEVICE PROGR EVAL DUAL: CPT

## 2019-04-02 NOTE — ED PROVIDER NOTE - AXIS
[facility]   Progress Note and Procedure Note      Scar Schilling  AGE: 79 y.o. GENDER: female  : 1938  TODAY'S DATE:  2019    Subjective:        HISTORY of PRESENT ILLNESS HPI   cSar Schilling is a 79 y.o. female who presents today for wound evaluation. Wound Type:venous and traumatic  Wound Location:left lower leg  Modifying factors:edema, venous stasis, lymphedema and obesity    Patient Active Problem List   Diagnosis Code    Non-pressure chronic ulcer of left lower leg, with fat layer exposed ([address] L97.922    Lymphedema I89.0    Morbid obesity (HCC) E66.01    Venous stasis dermatitis of both lower extremities I87.2       ALLERGIES    Allergies   Allergen Reactions    Aminophylline     Ampicillin     Cephalosporins     Ciprofloxacin     Erythromycin     Other      Sodium      Penicillamine     Penicillins     Spironolactone     Sulfa Antibiotics     Sulfacetamide             Objective:      BP (!) 141/97   Pulse 62   Temp 98.1 °F (36.7 °C) (Temporal)   Resp 20   Ht 4' 11\" (1.499 m)   Wt 190 lb (86.2 kg)   BMI 38.38 kg/m²     Post Debridement Measurements and Assessment:  Wound 19 Knee Left; Anterior wound 1 - left knee  (Active)   Wound Image   2019  1:44 PM   Wound Venous 2019  1:44 PM   Dressing Status Old drainage 2019  1:44 PM   Dressing Changed Changed/New 3/18/2019 10:40 AM   Dressing/Treatment Packing 3/18/2019 10:40 AM   Wound Cleansed Rinsed/Irrigated with saline 2019  1:44 PM   Wound Length (cm) 0.7 cm 2019  1:44 PM   Wound Width (cm) 1.2 cm 2019  1:44 PM   Wound Depth (cm) 2 cm 2019  1:44 PM   Wound Surface Area (cm^2) 0.84 cm^2 2019  1:44 PM   Change in Wound Size % (l*w) 72 2019  1:44 PM   Wound Volume (cm^3) 1.68 cm^3 2019  1:44 PM   Wound Healing % 63 2019  1:44 PM   Post-Procedure Length (cm) 0.7 cm 2019  2:14 PM   Post-Procedure Width (cm) 1.2 cm 2019  2:14 PM   Post-Procedure Depth (cm) 2 cm 4/2/2019  2:14 PM   Post-Procedure Surface Area (cm^2) 0.84 cm^2 4/2/2019  2:14 PM   Post-Procedure Volume (cm^3) 1.68 cm^3 4/2/2019  2:14 PM   Distance Tunneling (cm) 0 cm 3/18/2019 10:40 AM   Tunneling Position ___ O'Clock 0 3/18/2019 10:40 AM   Undermining Starts ___ O'Clock 12 4/2/2019  1:44 PM   Undermining Ends___ O'Clock 12 4/2/2019  1:44 PM   Undermining Maxium Distance (cm) 0.9 4/2/2019  1:44 PM   Wound Assessment Red;Pink 4/2/2019  1:44 PM   Drainage Amount Moderate 4/2/2019  1:44 PM   Drainage Description Clear 4/2/2019  1:44 PM   Odor None 4/2/2019  1:44 PM   Margins Attached edges 4/2/2019  1:44 PM   Exposed structure Fascia 4/2/2019  1:44 PM   Anisha-wound Assessment Pink;Red 4/2/2019  1:44 PM   Non-staged Wound Description Full thickness 4/2/2019  1:44 PM   [place]%Wound Bed 75 4/2/2019  1:44 PM   Red%Wound Bed 25 4/2/2019  1:44 PM   Yellow%Wound Bed 0 4/2/2019  1:44 PM   Black%Wound Bed 0 4/2/2019  1:44 PM   Purple%Wound Bed 0 4/2/2019  1:44 PM   Other%Wound Bed 0 3/18/2019 10:40 AM   Culture Taken No 3/18/2019 10:40 AM   Number of days: 15        The patients pain isPain Level: 0  . Wound(s) is unchanged. Please refer to nursing measurements and assessment regarding wound pre and post debridement. Procedure Note:    Wound #: 1     Debridement: Excisional Debridement    Anesthetic: Anesthetic: 2% Lidocaine Gel Topical  Using curette and #15 blade scalpel the wound was sharply debrided    down through and including the removal of epidermis, dermis and subcutaneous tissue. Total Surface Area Debrided:  0.84 sq cm   Devitalized Tissue Debrided:  fibrin, biofilm and slough    Percent of Wound Debrided: 100%      Bleeding: Minimal    Hemostasis:   by silver nitrate stick      Response to treatment:  Well tolerated by patient.       Assessment:      Patient Active Problem List   Diagnosis    Non-pressure chronic ulcer of left lower leg, with fat layer exposed ([address]    Lymphedema    Morbid obesity Normal

## 2019-08-27 ENCOUNTER — EMERGENCY (EMERGENCY)
Facility: HOSPITAL | Age: 49
LOS: 1 days | Discharge: HOME | End: 2019-08-27
Attending: EMERGENCY MEDICINE
Payer: COMMERCIAL

## 2019-08-27 VITALS
TEMPERATURE: 99 F | RESPIRATION RATE: 18 BRPM | WEIGHT: 179.9 LBS | DIASTOLIC BLOOD PRESSURE: 79 MMHG | OXYGEN SATURATION: 100 % | HEART RATE: 62 BPM | HEIGHT: 70 IN | SYSTOLIC BLOOD PRESSURE: 123 MMHG

## 2019-08-27 VITALS
RESPIRATION RATE: 16 BRPM | HEART RATE: 60 BPM | OXYGEN SATURATION: 100 % | SYSTOLIC BLOOD PRESSURE: 137 MMHG | DIASTOLIC BLOOD PRESSURE: 79 MMHG | TEMPERATURE: 99 F

## 2019-08-27 DIAGNOSIS — Z98.890 OTHER SPECIFIED POSTPROCEDURAL STATES: Chronic | ICD-10-CM

## 2019-08-27 LAB
ALBUMIN SERPL ELPH-MCNC: 4.2 G/DL — SIGNIFICANT CHANGE UP (ref 3.3–5)
ALP SERPL-CCNC: 87 U/L — SIGNIFICANT CHANGE UP (ref 40–120)
ALT FLD-CCNC: 17 U/L — SIGNIFICANT CHANGE UP (ref 10–45)
ANION GAP SERPL CALC-SCNC: 12 MMOL/L — SIGNIFICANT CHANGE UP (ref 5–17)
AST SERPL-CCNC: 35 U/L — SIGNIFICANT CHANGE UP (ref 10–40)
BASOPHILS # BLD AUTO: 0.1 K/UL — SIGNIFICANT CHANGE UP (ref 0–0.2)
BASOPHILS NFR BLD AUTO: 0.7 % — SIGNIFICANT CHANGE UP (ref 0–2)
BILIRUB SERPL-MCNC: 0.5 MG/DL — SIGNIFICANT CHANGE UP (ref 0.2–1.2)
BUN SERPL-MCNC: 10 MG/DL — SIGNIFICANT CHANGE UP (ref 7–23)
CALCIUM SERPL-MCNC: 10 MG/DL — SIGNIFICANT CHANGE UP (ref 8.4–10.5)
CHLORIDE SERPL-SCNC: 102 MMOL/L — SIGNIFICANT CHANGE UP (ref 96–108)
CO2 SERPL-SCNC: 22 MMOL/L — SIGNIFICANT CHANGE UP (ref 22–31)
CREAT SERPL-MCNC: 0.9 MG/DL — SIGNIFICANT CHANGE UP (ref 0.5–1.3)
EOSINOPHIL # BLD AUTO: 0.1 K/UL — SIGNIFICANT CHANGE UP (ref 0–0.5)
EOSINOPHIL NFR BLD AUTO: 0.9 % — SIGNIFICANT CHANGE UP (ref 0–6)
GLUCOSE SERPL-MCNC: 81 MG/DL — SIGNIFICANT CHANGE UP (ref 70–99)
HCT VFR BLD CALC: 44.7 % — SIGNIFICANT CHANGE UP (ref 34.5–45)
HGB BLD-MCNC: 14.6 G/DL — SIGNIFICANT CHANGE UP (ref 11.5–15.5)
LYMPHOCYTES # BLD AUTO: 1.6 K/UL — SIGNIFICANT CHANGE UP (ref 1–3.3)
LYMPHOCYTES # BLD AUTO: 22.9 % — SIGNIFICANT CHANGE UP (ref 13–44)
MCHC RBC-ENTMCNC: 29.4 PG — SIGNIFICANT CHANGE UP (ref 27–34)
MCHC RBC-ENTMCNC: 32.6 GM/DL — SIGNIFICANT CHANGE UP (ref 32–36)
MCV RBC AUTO: 90.1 FL — SIGNIFICANT CHANGE UP (ref 80–100)
MONOCYTES # BLD AUTO: 0.7 K/UL — SIGNIFICANT CHANGE UP (ref 0–0.9)
MONOCYTES NFR BLD AUTO: 9.8 % — SIGNIFICANT CHANGE UP (ref 2–14)
NEUTROPHILS # BLD AUTO: 4.7 K/UL — SIGNIFICANT CHANGE UP (ref 1.8–7.4)
NEUTROPHILS NFR BLD AUTO: 65.7 % — SIGNIFICANT CHANGE UP (ref 43–77)
PLATELET # BLD AUTO: 183 K/UL — SIGNIFICANT CHANGE UP (ref 150–400)
POTASSIUM SERPL-MCNC: 4.5 MMOL/L — SIGNIFICANT CHANGE UP (ref 3.5–5.3)
POTASSIUM SERPL-SCNC: 4.5 MMOL/L — SIGNIFICANT CHANGE UP (ref 3.5–5.3)
PROT SERPL-MCNC: 8.2 G/DL — SIGNIFICANT CHANGE UP (ref 6–8.3)
RBC # BLD: 4.96 M/UL — SIGNIFICANT CHANGE UP (ref 3.8–5.2)
RBC # FLD: 13.3 % — SIGNIFICANT CHANGE UP (ref 10.3–14.5)
SODIUM SERPL-SCNC: 136 MMOL/L — SIGNIFICANT CHANGE UP (ref 135–145)
TROPONIN T, HIGH SENSITIVITY RESULT: <6 NG/L — SIGNIFICANT CHANGE UP (ref 0–51)
WBC # BLD: 7.1 K/UL — SIGNIFICANT CHANGE UP (ref 3.8–10.5)
WBC # FLD AUTO: 7.1 K/UL — SIGNIFICANT CHANGE UP (ref 3.8–10.5)

## 2019-08-27 PROCEDURE — 71046 X-RAY EXAM CHEST 2 VIEWS: CPT

## 2019-08-27 PROCEDURE — 99285 EMERGENCY DEPT VISIT HI MDM: CPT

## 2019-08-27 PROCEDURE — 85027 COMPLETE CBC AUTOMATED: CPT

## 2019-08-27 PROCEDURE — 93010 ELECTROCARDIOGRAM REPORT: CPT

## 2019-08-27 PROCEDURE — 99283 EMERGENCY DEPT VISIT LOW MDM: CPT | Mod: 25

## 2019-08-27 PROCEDURE — 71046 X-RAY EXAM CHEST 2 VIEWS: CPT | Mod: 26

## 2019-08-27 PROCEDURE — 93005 ELECTROCARDIOGRAM TRACING: CPT

## 2019-08-27 PROCEDURE — 80053 COMPREHEN METABOLIC PANEL: CPT

## 2019-08-27 PROCEDURE — 84484 ASSAY OF TROPONIN QUANT: CPT

## 2019-08-27 RX ORDER — IBUPROFEN 200 MG
600 TABLET ORAL ONCE
Refills: 0 | Status: COMPLETED | OUTPATIENT
Start: 2019-08-27 | End: 2019-08-27

## 2019-08-27 RX ADMIN — Medication 600 MILLIGRAM(S): at 15:30

## 2019-08-27 RX ADMIN — Medication 600 MILLIGRAM(S): at 15:31

## 2019-08-27 NOTE — ED PROVIDER NOTE - ATTENDING CONTRIBUTION TO CARE
Ronit Yung MD - Attending Physician: I have personally seen and examined this patient with the resident/fellow.  I have fully participated in the care of this patient. I have reviewed all pertinent clinical information, including history, physical exam, plan and the Resident/Fellow’s note and agree except as noted. See MDM

## 2019-08-27 NOTE — ED PROVIDER NOTE - NSFOLLOWUPINSTRUCTIONS_ED_ALL_ED_FT
Thank you for visiting our Emergency Department, it has been a pleasure taking part in your healthcare.    Please follow up with your Primary Doctor in 2-3 days. If you have persistent pain, please see your doctor sooner. Please follow-up with your Cardiologist    Ibuprofen 600mg every 6 hours as needed for pain. Avoid heavy lifting.      Nonspecific Chest Pain  Chest pain can be caused by many different conditions. It can be caused by a condition that is life-threatening and requires treatment right away. It can also be caused by something that is not life-threatening. If you have chest pain, it can be hard to know the difference, so it is important to get help right away to make sure that you do not have a serious condition.    Some life-threatening causes of chest pain include:  Heart attack.  A tear in the body's main blood vessel (aortic dissection).  Inflammation around your heart (pericarditis).  A problem in the lungs, such as a blood clot (pulmonary embolism) or a collapsed lung (pneumothorax).  Some non life-threatening causes of chest pain include:  Heartburn.  Anxiety or stress.  Damage to the bones, muscles, and cartilage that make up your chest wall.  Pneumonia or bronchitis.  Shingles infection (varicella-zoster virus).  Chest pain can feel like:  Pain or discomfort on the surface of your chest or deep in your chest.  Crushing, pressure, aching, or squeezing pain.  Burning or tingling.  Dull or sharp pain that is worse when you move, cough, or take a deep breath.  Pain or discomfort that is also felt in your back, neck, jaw, shoulder, or arm, or pain that spreads to any of these areas.  Your chest pain may come and go. It may also be constant. Your health care provider will do lab tests and other studies to find the cause of your pain. Treatment will depend on the cause of your chest pain.    Follow these instructions at home:  Pay attention to any changes in your symptoms. Tell your health care provider about them or any new symptoms. Follow these instructions from your health care provider.    Medicines     Take over-the-counter and prescription medicines only as told by your health care provider.  If you were prescribed an antibiotic, take it as told by your health care provider. Do not stop taking the antibiotic even if you start to feel better.  Lifestyle     Rest as directed by your health care provider.  Do not use any products that contain nicotine or tobacco, such as cigarettes and e-cigarettes. If you need help quitting, ask your health care provider.  Do not drink alcohol.  Make healthy lifestyle choices as recommended. These may include:  Getting regular exercise. Ask your health care provider to suggest some activities that are safe for you.  Eating a heart-healthy diet. This includes plenty of fresh fruits and vegetables, whole grains, low-fat (lean) protein, and low-fat dairy products. A dietitian can help you find healthy eating options.  Maintaining a healthy weight.  Managing any other health conditions you have, such as hypertension or diabetes.  Reducing stress, such as with yoga or relaxation techniques.  General instructions     Avoid any activities that bring on chest pain.  Keep all follow-up visits as told by your health care provider. This is important. This includes visits for any further testing if your chest pain does not go away.  Contact a health care provider if:  Your chest pain does not go away.  You feel depressed.  You have a fever.  Get help right away if:  Your chest pain gets worse.  You have a cough that gets worse, or you cough up blood.  You have severe pain in your abdomen.  You faint.  You have sudden, unexplained chest discomfort.  You have sudden, unexplained discomfort in your arms, back, neck, or jaw.  You have shortness of breath at any time.  You suddenly start to sweat, or your skin gets clammy.  You feel nausea or you vomit.  You suddenly feel lightheaded or dizzy.  You have severe weakness, or unexplained weakness or fatigue.  Your heart begins to beat quickly, or it feels like it is skipping beats.  These symptoms may represent a serious problem that is an emergency. Do not wait to see if the symptoms will go away. Get medical help right away. Call your local emergency services (911 in the [place] Do not drive yourself to the hospital.     Summary  Chest pain can be caused by a condition that is serious and requires urgent treatment. It may also be caused by something that is not life-threatening.  If you have chest pain, it is very important to see your health care provider. Your health care provider may do lab tests and other studies to find the cause of your pain.  Follow your health care provider's instructions on taking medicines, making lifestyle changes, and getting emergency treatment if symptoms become worse.  Keep all follow-up visits as told by your health care provider. This includes visits for any further testing if your chest pain does not go away.  This information is not intended to replace advice given to you by your health care provider. Make sure you discuss any questions you have with your health care provider.

## 2019-08-27 NOTE — ED ADULT NURSE NOTE - OBJECTIVE STATEMENT
49 yrs old female with h/o pacemaker present to the ER for left sided chest discomfort, SOB on exertion and tiredness. As per pt she started to experience tiredness and chest discomfort on Friday which resolved. Pt report symptoms again started again this morning . Reports pain level of 8/10 on pain scale on exertion and 6/10 on rest. Co worker also report that pt was unsteady on her feet, hence the reason she drove her here to the ER. Denies recent travel/ sick contact, fever, chills and dizziness.

## 2019-08-27 NOTE — ED ADULT NURSE NOTE - NSIMPLEMENTINTERV_GEN_ALL_ED
Implemented All Fall with Harm Risk Interventions:  [place] to call system. Call bell, personal items and telephone within reach. Instruct patient to call for assistance. Room bathroom lighting operational. Non-slip footwear when patient is off stretcher. Physically safe environment: no spills, clutter or unnecessary equipment. Stretcher in lowest position, wheels locked, appropriate side rails in place. Provide visual cue, wrist band, yellow gown, etc. Monitor gait and stability. Monitor for mental status changes and reorient to person, place, and time. Review medications for side effects contributing to fall risk. Reinforce activity limits and safety measures with patient and family. Provide visual clues: red socks.

## 2019-08-27 NOTE — ED PROVIDER NOTE - OBJECTIVE STATEMENT
48 y/o female with hx of SVT (s/p ablation now with pacemaker for symptomatic bradycardia placed 06/2015) here now with CP and feeling like heart is "jumping out of her chest" since this morning. +tachycardia (HR 120bpm), O2sat reported by pt in mid to high 70s, and left-sided arm numbness. Described as throbbing in left sternum; states pain moves when taking a breath. Denies N/V, dizziness. Pt does report fully body weakness. Denies any other sxs. NKDA. No daily medications. 48 y/o female with hx of SVT (s/p ablation now with pacemaker for symptomatic bradycardia placed 06/2015) here now with CP and feeling like heart is "jumping out of her chest" since this morning. left-sided arm numbness. Described as throbbing in left sternum; states pain moves when taking a breath. Pain also worsened with moving her arm. Denies N/V, dizziness. Pt does report fully body weakness. Denies any other sxs. NKDA. No daily medications. Reports it feels similar to after she had her pacemaker placed.

## 2019-08-27 NOTE — ED PROVIDER NOTE - NS_ ATTENDINGSCRIBEDETAILS _ED_A_ED_FT
Ronit Yung MD - Attending Physician: The scribe's documentation has been prepared under my direction and personally reviewed by me in its entirety. I confirm that the note above accurately reflects all work, treatment, procedures, and medical decision making performed by me.

## 2019-08-27 NOTE — ED PROVIDER NOTE - CHPI ED SYMPTOMS POS
CHEST PAIN/heart "Jumping out of chest" sensation, low reported O2sat, left-sided arm numbness, full body weakness

## 2019-08-27 NOTE — ED PROVIDER NOTE - PROGRESS NOTE DETAILS
Labs, Xr, EKG wnl. Likely MSK. Pt reports she was told this by her Cardiologist, but just wanted to be sure. Feels comfortable with dc. F/u outpatient. Return precautions discussed

## 2019-08-27 NOTE — ED PROVIDER NOTE - PHYSICAL EXAMINATION
GEN: Well-appearing, NAD.  EYES: EOMI, PERRL  LUNGS: CTA, no wheezes, rhonchi, or rales.  HEART: RRR, no murmurs, rubs, or gallops.  ABD: Soft, nontender, nondistended  MSK: Full ROM in bilateral upper extremities. Strength 5/5 in bilateral upper extremities. Sensation intact in bilateral upper extremities.

## 2019-08-27 NOTE — ED PROVIDER NOTE - MUSCULOSKELETAL, MLM
Full ROM in bilateral upper extremities. Strength 5/5 in bilateral upper extremities. Sensation intact in bilateral upper extremities.

## 2019-08-28 ENCOUNTER — APPOINTMENT (OUTPATIENT)
Dept: ELECTROPHYSIOLOGY | Facility: CLINIC | Age: 49
End: 2019-08-28
Payer: COMMERCIAL

## 2019-08-28 ENCOUNTER — NON-APPOINTMENT (OUTPATIENT)
Age: 49
End: 2019-08-28

## 2019-08-28 VITALS
HEART RATE: 83 BPM | HEIGHT: 70 IN | DIASTOLIC BLOOD PRESSURE: 80 MMHG | WEIGHT: 179.45 LBS | SYSTOLIC BLOOD PRESSURE: 112 MMHG | BODY MASS INDEX: 25.69 KG/M2 | OXYGEN SATURATION: 100 %

## 2019-08-28 PROCEDURE — 93280 PM DEVICE PROGR EVAL DUAL: CPT

## 2019-09-08 NOTE — ED PROVIDER NOTE - PATIENT PORTAL LINK FT
Pt admitted for UTI. DM 2. Pt was afebrile at earlier time. BC x 2 done, pt was started on 2 new antibiotics received tylenol 1gm IV. You can access the FollowMyHealth Patient Portal offered by [facility] by registering at the following website: [email or web address]. By joining [employer]’s FollowMyHealth portal, you will also be able to view your health information using other applications (apps) compatible with our system.

## 2019-09-16 ENCOUNTER — APPOINTMENT (OUTPATIENT)
Dept: ELECTROPHYSIOLOGY | Facility: CLINIC | Age: 49
End: 2019-09-16
Payer: COMMERCIAL

## 2019-09-16 VITALS
BODY MASS INDEX: 25.05 KG/M2 | HEART RATE: 67 BPM | HEIGHT: 70 IN | OXYGEN SATURATION: 99 % | DIASTOLIC BLOOD PRESSURE: 77 MMHG | SYSTOLIC BLOOD PRESSURE: 113 MMHG | WEIGHT: 175 LBS

## 2019-09-16 PROCEDURE — 99214 OFFICE O/P EST MOD 30 MIN: CPT

## 2019-09-16 PROCEDURE — 93000 ELECTROCARDIOGRAM COMPLETE: CPT

## 2019-09-16 RX ORDER — ESCITALOPRAM OXALATE 10 MG/1
10 TABLET ORAL
Qty: 30 | Refills: 0 | Status: DISCONTINUED | COMMUNITY
Start: 2017-12-18 | End: 2019-09-16

## 2019-09-16 NOTE — DISCUSSION/SUMMARY
[FreeTextEntry1] : Ms Slaughter is a 49 year old female with a history of atrial tachycardia from her magalis terminalis in 10/16/2012 with subsequent worsening sinus node dysfunction now s/p dual chamber pacemaker. SHe had recent palpitations in the setting of caffeine consumption, without event on device interrogation. An event monitor ordered by the ER is pending. I will call her with the results and she can follow up in device clinic in 3 months. She will abstain from further caffeine intake.\par \par I appreciate the opportunity to be involved in her care. \par

## 2019-09-16 NOTE — PHYSICAL EXAM
[General Appearance - Well Developed] : well developed [General Appearance - Well Nourished] : well nourished [Normal Conjunctiva] : the conjunctiva exhibited no abnormalities [No Oral Pallor] : no oral pallor [No Oral Cyanosis] : no oral cyanosis [Normal Jugular Venous V Waves Present] : normal jugular venous V waves present [Respiration, Rhythm And Depth] : normal respiratory rhythm and effort [Exaggerated Use Of Accessory Muscles For Inspiration] : no accessory muscle use [Auscultation Breath Sounds / Voice Sounds] : lungs were clear to auscultation bilaterally [Heart Rate And Rhythm] : heart rate and rhythm were normal [Heart Sounds] : normal S1 and S2 [Murmurs] : no murmurs present [Abdomen Soft] : soft [Abdomen Tenderness] : non-tender [Abdomen Mass (___ Cm)] : no abdominal mass palpated [Abnormal Walk] : normal gait [Gait - Sufficient For Exercise Testing] : the gait was sufficient for exercise testing [Nail Clubbing] : no clubbing of the fingernails [Skin Color & Pigmentation] : normal skin color and pigmentation [Cyanosis, Localized] : no localized cyanosis [] : no rash [No Skin Ulcers] : no skin ulcer [No Xanthoma] : no  xanthoma was observed [Oriented To Time, Place, And Person] : oriented to person, place, and time [No Anxiety] : not feeling anxious [FreeTextEntry1] : 2+ carotids

## 2019-09-16 NOTE — HISTORY OF PRESENT ILLNESS
[FreeTextEntry1] : Dear Dr. Orta, \par \par As you know, Ms Slaughter is a 47 year old female with a history of atrial tachycardia from her magalis terminalis s/p EP study/ablation in 10/16/2012. She was found to have dual AV nodes but no AVNRT. Post procedure she had episodes of sinus tachycardia. \par \par She was subsequently noted to have episodes of bradycardia and had an ILR implanted. Follow up EP study evidenced marked sinus node dysfunction and a dual chamber pacemaker. Earlier this month she \par drank a cup of coffee and soon thereafter developed several minutes of palpitations. She came to the emergency room and was given an event monitor, which has yet to return. \par \par Device interrogation today shows normal pace/sense function and battery life. There are no events from that day but several brief episodes of non-sustained arrhythmia (likely AVNRT) several days subsequently. SHe has had no recurrence since discontinuing coffee.

## 2019-10-02 ENCOUNTER — APPOINTMENT (OUTPATIENT)
Dept: ELECTROPHYSIOLOGY | Facility: CLINIC | Age: 49
End: 2019-10-02

## 2019-11-12 NOTE — PATIENT PROFILE ADULT. - MEDICATION ADMINISTRATION INFO, PROFILE
Today's date: 2019  Patient name: José Miguel Pantoja  : 1972  MRN: [id number]  Referring provider: James Hernandez DO  Dx:   Encounter Diagnosis     ICD-10-CM    1  Acute left-sided low back pain without sciatica M54 5    2  Chronic pain of right knee M25 561     G89 29    3  Difficulty walking R26 2    4  Contusion of right knee, subsequent encounter S80  01XD    5  Chronic left-sided low back pain without sciatica M54 5     G89 29      Subjective:  Cristy states her back is sore today  She does feel better than when she started but is still painful  Objective: See treatment log below    Assessment: Tolerated treatment well  Patient exhibited good technique with therapeutic exercises and would benefit from continued PT    Plan: Continue per plan of care  Progress treatment as tolerated         Precautions:  Left Anterior Hip Pain / Right Anterior Medial Patellar Region Pain    Daily Treatment Log  Manual       MT, ROM 15' 30' 30'     HEP        Exercise Log      Balance Board 30x 30x ea 30x     Chair Squats 30x       P-Bar-GT-Forward, Backward,Side-Even & Dips        BOSU-Walk 5' 5' 5'     Foam Pad SLR,Hip/KneeFl,Step Ups 30x 30x ea 30x     Foam Beam 15x 12x ea 15x     Monster Steps        T/G-Squats PF 30x 30x ea 30x     W/P-Hip-Abd,Add,Flex,Ext 10#-30x 10# 30x ea 10#-30x     WP-Squats        Trimax-TKE        Uwafiej-CB-Ut 2x2' 2x2' 2x2'     NK Table Exer        StandingBackExtCounter  30x  30x     SeatedTrunkFlexionGreen  TBall  3x ea 3x     Bike        ME, PE 15' 15' 15'             Modalities      MH&ES 20' 20' 20'     US 10' NT no concerns

## 2020-01-22 ENCOUNTER — APPOINTMENT (OUTPATIENT)
Dept: ELECTROPHYSIOLOGY | Facility: CLINIC | Age: 50
End: 2020-01-22
Payer: COMMERCIAL

## 2020-01-22 VITALS
OXYGEN SATURATION: 96 % | HEART RATE: 86 BPM | SYSTOLIC BLOOD PRESSURE: 119 MMHG | HEIGHT: 70 IN | DIASTOLIC BLOOD PRESSURE: 80 MMHG

## 2020-01-22 PROCEDURE — 93280 PM DEVICE PROGR EVAL DUAL: CPT

## 2020-02-24 ENCOUNTER — APPOINTMENT (OUTPATIENT)
Dept: ELECTROPHYSIOLOGY | Facility: CLINIC | Age: 50
End: 2020-02-24
Payer: COMMERCIAL

## 2020-02-24 VITALS
WEIGHT: 178 LBS | HEART RATE: 99 BPM | SYSTOLIC BLOOD PRESSURE: 126 MMHG | OXYGEN SATURATION: 98 % | DIASTOLIC BLOOD PRESSURE: 89 MMHG | HEIGHT: 70 IN | BODY MASS INDEX: 25.48 KG/M2

## 2020-02-24 PROCEDURE — 93280 PM DEVICE PROGR EVAL DUAL: CPT

## 2020-05-26 ENCOUNTER — APPOINTMENT (OUTPATIENT)
Dept: ELECTROPHYSIOLOGY | Facility: CLINIC | Age: 50
End: 2020-05-26
Payer: COMMERCIAL

## 2020-05-26 PROCEDURE — 93294 REM INTERROG EVL PM/LDLS PM: CPT

## 2020-05-26 PROCEDURE — 93296 REM INTERROG EVL PM/IDS: CPT

## 2020-06-01 ENCOUNTER — APPOINTMENT (OUTPATIENT)
Dept: ELECTROPHYSIOLOGY | Facility: CLINIC | Age: 50
End: 2020-06-01

## 2020-08-20 PROBLEM — I49.5 SINUS NODE DYSFUNCTION: Status: ACTIVE | Noted: 2018-06-27

## 2020-09-07 ENCOUNTER — FORM ENCOUNTER (OUTPATIENT)
Age: 50
End: 2020-09-07

## 2020-11-04 ENCOUNTER — APPOINTMENT (OUTPATIENT)
Dept: ELECTROPHYSIOLOGY | Facility: CLINIC | Age: 50
End: 2020-11-04
Payer: COMMERCIAL

## 2020-11-04 PROCEDURE — 93296 REM INTERROG EVL PM/IDS: CPT

## 2020-11-04 PROCEDURE — 93294 REM INTERROG EVL PM/LDLS PM: CPT

## 2021-02-04 ENCOUNTER — APPOINTMENT (OUTPATIENT)
Dept: ELECTROPHYSIOLOGY | Facility: CLINIC | Age: 51
End: 2021-02-04
Payer: COMMERCIAL

## 2021-02-04 PROCEDURE — 93294 REM INTERROG EVL PM/LDLS PM: CPT

## 2021-02-04 PROCEDURE — 93296 REM INTERROG EVL PM/IDS: CPT

## 2021-02-22 ENCOUNTER — APPOINTMENT (OUTPATIENT)
Dept: ELECTROPHYSIOLOGY | Facility: CLINIC | Age: 51
End: 2021-02-22

## 2021-05-06 ENCOUNTER — NON-APPOINTMENT (OUTPATIENT)
Age: 51
End: 2021-05-06

## 2021-05-06 ENCOUNTER — APPOINTMENT (OUTPATIENT)
Dept: ELECTROPHYSIOLOGY | Facility: CLINIC | Age: 51
End: 2021-05-06
Payer: COMMERCIAL

## 2021-05-06 PROCEDURE — 93294 REM INTERROG EVL PM/LDLS PM: CPT

## 2021-05-06 PROCEDURE — 93296 REM INTERROG EVL PM/IDS: CPT

## 2021-08-05 ENCOUNTER — APPOINTMENT (OUTPATIENT)
Dept: ELECTROPHYSIOLOGY | Facility: CLINIC | Age: 51
End: 2021-08-05
Payer: COMMERCIAL

## 2021-08-05 ENCOUNTER — NON-APPOINTMENT (OUTPATIENT)
Age: 51
End: 2021-08-05

## 2021-08-05 PROCEDURE — 93294 REM INTERROG EVL PM/LDLS PM: CPT

## 2021-08-05 PROCEDURE — 93296 REM INTERROG EVL PM/IDS: CPT

## 2021-08-08 ENCOUNTER — TRANSCRIPTION ENCOUNTER (OUTPATIENT)
Age: 51
End: 2021-08-08

## 2021-08-08 ENCOUNTER — INPATIENT (INPATIENT)
Facility: HOSPITAL | Age: 51
LOS: 2 days | Discharge: HOME | DRG: 206 | End: 2021-08-11
Attending: INTERNAL MEDICINE | Admitting: INTERNAL MEDICINE
Payer: COMMERCIAL

## 2021-08-08 VITALS
HEIGHT: 70 IN | WEIGHT: 192.02 LBS | DIASTOLIC BLOOD PRESSURE: 89 MMHG | SYSTOLIC BLOOD PRESSURE: 126 MMHG | TEMPERATURE: 98 F | OXYGEN SATURATION: 96 % | HEART RATE: 80 BPM | RESPIRATION RATE: 20 BRPM

## 2021-08-08 DIAGNOSIS — Z98.890 OTHER SPECIFIED POSTPROCEDURAL STATES: Chronic | ICD-10-CM

## 2021-08-08 LAB
ALBUMIN SERPL ELPH-MCNC: 3.9 G/DL — SIGNIFICANT CHANGE UP (ref 3.3–5)
ALP SERPL-CCNC: 92 U/L — SIGNIFICANT CHANGE UP (ref 40–120)
ALT FLD-CCNC: 13 U/L — SIGNIFICANT CHANGE UP (ref 10–45)
ANION GAP SERPL CALC-SCNC: 11 MMOL/L — SIGNIFICANT CHANGE UP (ref 5–17)
AST SERPL-CCNC: 22 U/L — SIGNIFICANT CHANGE UP (ref 10–40)
BILIRUB SERPL-MCNC: 0.2 MG/DL — SIGNIFICANT CHANGE UP (ref 0.2–1.2)
BUN SERPL-MCNC: 10 MG/DL — SIGNIFICANT CHANGE UP (ref 7–23)
CALCIUM SERPL-MCNC: 9.1 MG/DL — SIGNIFICANT CHANGE UP (ref 8.4–10.5)
CHLORIDE SERPL-SCNC: 104 MMOL/L — SIGNIFICANT CHANGE UP (ref 96–108)
CK SERPL-CCNC: 83 U/L — SIGNIFICANT CHANGE UP (ref 25–170)
CO2 SERPL-SCNC: 23 MMOL/L — SIGNIFICANT CHANGE UP (ref 22–31)
CREAT SERPL-MCNC: 0.95 MG/DL — SIGNIFICANT CHANGE UP (ref 0.5–1.3)
GLUCOSE SERPL-MCNC: 86 MG/DL — SIGNIFICANT CHANGE UP (ref 70–99)
HCT VFR BLD CALC: 39.1 % — SIGNIFICANT CHANGE UP (ref 34.5–45)
HGB BLD-MCNC: 12.6 G/DL — SIGNIFICANT CHANGE UP (ref 11.5–15.5)
MAGNESIUM SERPL-MCNC: 1.9 MG/DL — SIGNIFICANT CHANGE UP (ref 1.6–2.6)
MCHC RBC-ENTMCNC: 29.6 PG — SIGNIFICANT CHANGE UP (ref 27–34)
MCHC RBC-ENTMCNC: 32.2 GM/DL — SIGNIFICANT CHANGE UP (ref 32–36)
MCV RBC AUTO: 91.8 FL — SIGNIFICANT CHANGE UP (ref 80–100)
NRBC # BLD: 0 /100 WBCS — SIGNIFICANT CHANGE UP (ref 0–0)
PHOSPHATE SERPL-MCNC: 2.3 MG/DL — LOW (ref 2.5–4.5)
PLATELET # BLD AUTO: 201 K/UL — SIGNIFICANT CHANGE UP (ref 150–400)
POTASSIUM SERPL-MCNC: 3.6 MMOL/L — SIGNIFICANT CHANGE UP (ref 3.5–5.3)
POTASSIUM SERPL-SCNC: 3.6 MMOL/L — SIGNIFICANT CHANGE UP (ref 3.5–5.3)
PROT SERPL-MCNC: 7 G/DL — SIGNIFICANT CHANGE UP (ref 6–8.3)
RBC # BLD: 4.26 M/UL — SIGNIFICANT CHANGE UP (ref 3.8–5.2)
RBC # FLD: 13.2 % — SIGNIFICANT CHANGE UP (ref 10.3–14.5)
SODIUM SERPL-SCNC: 138 MMOL/L — SIGNIFICANT CHANGE UP (ref 135–145)
TROPONIN T, HIGH SENSITIVITY RESULT: <6 NG/L — SIGNIFICANT CHANGE UP (ref 0–51)
WBC # BLD: 5.07 K/UL — SIGNIFICANT CHANGE UP (ref 3.8–10.5)
WBC # FLD AUTO: 5.07 K/UL — SIGNIFICANT CHANGE UP (ref 3.8–10.5)

## 2021-08-08 PROCEDURE — 93010 ELECTROCARDIOGRAM REPORT: CPT

## 2021-08-08 PROCEDURE — 71045 X-RAY EXAM CHEST 1 VIEW: CPT | Mod: 26

## 2021-08-08 PROCEDURE — 99285 EMERGENCY DEPT VISIT HI MDM: CPT

## 2021-08-08 RX ORDER — ACETAMINOPHEN 500 MG
650 TABLET ORAL ONCE
Refills: 0 | Status: COMPLETED | OUTPATIENT
Start: 2021-08-08 | End: 2021-08-08

## 2021-08-08 RX ORDER — IBUPROFEN 200 MG
500 TABLET ORAL ONCE
Refills: 0 | Status: COMPLETED | OUTPATIENT
Start: 2021-08-08 | End: 2021-08-08

## 2021-08-08 NOTE — ED PROVIDER NOTE - CLINICAL SUMMARY MEDICAL DECISION MAKING FREE TEXT BOX
50yo F with hx of bradycardia s/p PPM (2018), SVT s/p ablation presents with left sided chest pain likely MSK vs ACS vs GERD. PE notable for diffuse left chest wall tendeness but non cellulitic appearing. Pending cardiac enzymes, EP consult for PPM interogation. 50yo F with hx of bradycardia s/p PPM (2018), SVT s/p ablation presents with left sided chest pain likely MSK vs ACS vs GERD. PE notable for diffuse left chest wall tenderness but non cellulitic appearing. Pending cardiac enzymes 50yo F with hx of bradycardia s/p PPM (2018), SVT s/p ablation presents with left sided chest pain likely MSK vs ACS vs GERD. PE notable for diffuse left chest wall tenderness but non cellulitic appearing. Pending cardiac enzymes    CORNELIUS Cardona MD: Agree with resident MDM, assessment and plan as above. Ddx includes, however, is not limited to: ACS. Low risk for PE as lack risk factors. If cardiac enzymes negative, will keep for stress test or other provocative testing

## 2021-08-08 NOTE — ED PROVIDER NOTE - NS ED ROS FT
Review of Systems:  Constitutional: No fever, No weight loss, good appetite/po intake  Head: No headache or dizziness   Eyes: No blurry vision, No diplopia  Neuro: No tremors, No muscle weakness   Cardiovascular: + chest pain, No palpitations  Respiratory: + SOB, No cough  GI: +nausea, No vomiting, No diarrhea  : No dysuria, No hematuria  Skin: No rash or lesions  MSK: No joint pain or swelling  Psych: No depression or mood changes

## 2021-08-08 NOTE — ED ADULT TRIAGE NOTE - INTERNATIONAL TRAVEL
HPI Comments: 81-year-old female arrives via EMS with complaint of elevated blood sugar at home earlier today. Patient states that she ate  20 count nugget meal with large wilkinson earlier today from [employer]. States she took metformin earlier this morning. States her sugars have been running in 100s-200s. States that EMS told her blood glucose was over 300. Patient reportedly was in her bathroom where she had episode of diarrhea and felt nauseated and reportedly laid down on the bathroom floor. Patient states she did not pass out or lose consciousness. Denies hitting her head. Patient denies abdominal pain, chest pain, fever, chills, dysuria, hematuria, shortness of breath. LMP current. Patient is a 53 y.o. female presenting with blood sugar problem. The history is provided by the patient. No  was used. Blood sugar problem    This is a new problem. The current episode started 12 to 24 hours ago. The problem occurs constantly. The problem has not changed since onset. The pain is at a severity of 0/10. The patient is experiencing no pain. Pertinent negatives include no fever, no diarrhea, no hematochezia, no melena, no vomiting, no constipation, no dysuria, no hematuria, no headaches, no chest pain and no back pain. Nothing worsens the pain. The pain is relieved by nothing. Past Medical History:   Diagnosis Date    Arthritis     hands, arms and legs    Asthma     uses inhalers    CAD (coronary artery disease) 2008     nonobstructive Cad, no stents    Diabetes ([facility] [address]     oral hypoglycemics, avg. fasting -130, no symptoms of low BS    GERD (gastroesophageal reflux disease)     takes Alkaselzier     Hypertension     monitoring closely with diet control    Neuropathy     Obesity (BMI 30-39. 9)     BMI 38.8. Nurys Pummel Nurys Pummel Nurys Pummel     Palpitations     occassionally with no treatment    PUD (peptic ulcer disease) 2011       Past Surgical History:   Procedure Laterality Date    CARDIAC SURG PROCEDURE UNLIST      cath 4 yrs ago, no stents, has blockages    HX GYN       x2    HX TUBAL LIGATION           Family History:   Problem Relation Age of Onset    Diabetes Mother     Heart Disease Mother     Heart Disease Father     Asthma Father     Hypertension Father        Social History     Social History    Marital status: SINGLE     Spouse name: N/A    Number of children: N/A    Years of education: N/A     Occupational History    Not on file. Social History Main Topics    Smoking status: Current Every Day Smoker    Smokeless tobacco: Never Used      Comment: smokes 1 to 2 cigarettes a day/ currently cutting back    Alcohol use No      Comment: rarely    Drug use: No    Sexual activity: Not on file     Other Topics Concern    Not on file     Social History Narrative         ALLERGIES: Pcn [penicillins]    Review of Systems   Constitutional: Negative for chills, fatigue and fever. HENT: Negative for congestion and rhinorrhea. Respiratory: Negative for cough and shortness of breath. Cardiovascular: Negative for chest pain, palpitations and leg swelling. Gastrointestinal: Negative for abdominal pain, constipation, diarrhea, hematochezia, melena and vomiting. Genitourinary: Negative for dysuria, flank pain and hematuria. Musculoskeletal: Negative for back pain, gait problem, joint swelling, neck pain and neck stiffness. Skin: Negative for pallor and rash. Neurological: Negative for dizziness, seizures, syncope, weakness, numbness and headaches. Vitals:    18 1459   BP: 126/79   Pulse: 64   Resp: 16   Temp: 98.1 °F (36.7 °C)   SpO2: 100%   Weight: 89.8 kg (198 lb)   Height: 5' 4\" (1.626 m)            Physical Exam   Constitutional: She is oriented to person, place, and time. She appears well-developed and well-nourished. Patient sitting up in chair conversing with another patient in the hallway. HENT:   Head: Normocephalic and atraumatic. Mouth/Throat: Oropharynx is clear and moist. No oropharyngeal exudate. Eyes: Conjunctivae and EOM are normal. Pupils are equal, round, and reactive to light. Neck: Normal range of motion. No JVD present. No tracheal deviation present. Cardiovascular: Normal rate, regular rhythm, normal heart sounds and intact distal pulses. No murmur heard. Radial pulse 2+ and equal bilaterally. Pulmonary/Chest: Effort normal and breath sounds normal. No respiratory distress. She has no wheezes. She has no rales. She exhibits no tenderness. Abdominal: Soft. She exhibits no distension. There is no tenderness. There is no rebound and no guarding. Soft. NTND. No CVAT. Musculoskeletal: Normal range of motion. She exhibits no edema, tenderness or deformity. Neurological: She is alert and oriented to person, place, and time. No cranial nerve deficit. Coordination normal.   Skin: Skin is warm and dry. No rash noted. No erythema. No pallor. Nursing note and vitals reviewed. MDM  Number of Diagnoses or Management Options  Acute cystitis with hematuria: new and requires workup  Hyperglycemia: new and requires workup  Diagnosis management comments: Blood glucose 248 after 1 L fluid. No AGMA. Pt not in DKA. UA with evidence of UTI. Will discharge home with Macrobid and Diflucan. Will have patient follow with primary care physician in 1 to 2 days.        Amount and/or Complexity of Data Reviewed  Clinical lab tests: ordered and reviewed  Tests in the medicine section of CPT®: reviewed and ordered  Review and summarize past medical records: yes  Independent visualization of images, tracings, or specimens: yes    Risk of Complications, Morbidity, and/or Mortality  Diagnostic procedures: low  Management options: low    Patient Progress  Patient progress: stable        ED Course       EKG  Date/Time: 2/27/2018 6:32 PM  Performed by: Rachel Watkins, [address] [place] [address]  Authorized by: Alpesh Auguste     ECG reviewed by ED Physician in the absence of a cardiologist: yes    Previous ECG:     Previous ECG:  Compared to current    Similarity:  No change  Rate:     ECG rate:  61    ECG rate assessment: normal    Rhythm:     Rhythm: sinus rhythm    Ectopy:     Ectopy: none    QRS:     QRS axis:  Normal    QRS intervals:  Normal  Conduction:     Conduction: normal    ST segments:     ST segments:  Normal  T waves:     T waves: inverted      Inverted:  II, III, aVF, V4, V5 and V6 No

## 2021-08-08 NOTE — ED ADULT TRIAGE NOTE - CHIEF COMPLAINT QUOTE
left sided chest pain beginning last night while at rest. Pt has pacemaker in place. Pt received 324mg ASA via EMS PTA, with little relief.

## 2021-08-08 NOTE — ED PROVIDER NOTE - PROGRESS NOTE DETAILS
Given exertion dyspnea, will admit to medicine for cardiac evaluation (CT coronaries vs stress test). Unable to go CDU. Will admit to medicine.

## 2021-08-08 NOTE — ED PROVIDER NOTE - PHYSICAL EXAMINATION
PHYSICAL EXAM  GENERAL: NAD, lying comfortably in bed   HEAD:  Atraumatic, Normocephalic  EYES: EOMI b/l, conjunctiva and sclera clear  NECK: Supple, No LAD   CHEST/LUNG: Clear to auscultation bilaterally; No wheeze or ronchi  HEART: Regular rate and rhythm; No murmurs. Diffuse left chest wall tenderness including substernal. No erythema or warmth around PPM. Left back scalupa tenderness  ABDOMEN: Soft, Nontender, Nondistended; Bowel sounds present  EXTREMITIES:  2+ Peripheral Pulses, No edema  NEURO: AAOx3, non-focal   SKIN: No rashes or lesions

## 2021-08-08 NOTE — ED ADULT NURSE NOTE - OBJECTIVE STATEMENT
The pt is a 50y/o F PMH pacemaker, anxiety presenting to the ED c/o The patient reports. Upon assessment, pt is AO x 4, speaking in full and complete sentences, s1 s2 heart sounds, abd soft and nontender, bowel sounds present in all four quadrants, equal strength bilaterally, skin warm, dry and intact, present peripheral pulses, PERRL. Pt denies fever, chills, n/v, urinary symptoms, CP, dizziness, weakness, HA, SOB, abd pain. Pt on cardiac monitor, appropriate side rails raised, wheels locked, bed in lowest position, pt denies needs at this time. The pt is a 52y/o F PMH pacemaker, anxiety presenting to the ED c/o CP, left shoulder/neck pain, back pain x 1 day. The patient reports she felt musculoskeletal pain at pacemaker site one day ago, which she treated at home with 3 tablets of motrin, with no relief. Pt reports increased fatigue and worsening SHEETS since yesterday. Upon assessment, pt is AO x 4, speaking in full and complete sentences, s1 s2 heart sounds, abd soft and nontender, bowel sounds present in all four quadrants, equal strength bilaterally, skin warm, dry and intact, present peripheral pulses, PERRL. Pt denies fever, chills, n/v, urinary symptoms, dizziness, weakness, HA, SOB, abd pain. Pt paced on cardiac monitor, appropriate side rails raised, wheels locked, bed in lowest position, pt denies needs at this time.

## 2021-08-09 DIAGNOSIS — R07.9 CHEST PAIN, UNSPECIFIED: ICD-10-CM

## 2021-08-09 LAB
CK MB CFR SERPL CALC: 1 NG/ML — SIGNIFICANT CHANGE UP (ref 0–3.8)
HCG SERPL-ACNC: <2 MIU/ML — SIGNIFICANT CHANGE UP
SARS-COV-2 RNA SPEC QL NAA+PROBE: SIGNIFICANT CHANGE UP

## 2021-08-09 RX ORDER — ENOXAPARIN SODIUM 100 MG/ML
40 INJECTION SUBCUTANEOUS DAILY
Refills: 0 | Status: DISCONTINUED | OUTPATIENT
Start: 2021-08-09 | End: 2021-08-11

## 2021-08-09 RX ORDER — ESCITALOPRAM OXALATE 10 MG/1
1 TABLET, FILM COATED ORAL
Qty: 0 | Refills: 0 | DISCHARGE

## 2021-08-09 RX ORDER — ALPRAZOLAM 0.25 MG
0.5 TABLET ORAL DAILY
Refills: 0 | Status: DISCONTINUED | OUTPATIENT
Start: 2021-08-09 | End: 2021-08-11

## 2021-08-09 RX ORDER — ASPIRIN/CALCIUM CARB/MAGNESIUM 324 MG
81 TABLET ORAL DAILY
Refills: 0 | Status: DISCONTINUED | OUTPATIENT
Start: 2021-08-09 | End: 2021-08-11

## 2021-08-09 RX ORDER — ESCITALOPRAM OXALATE 10 MG/1
20 TABLET, FILM COATED ORAL DAILY
Refills: 0 | Status: DISCONTINUED | OUTPATIENT
Start: 2021-08-09 | End: 2021-08-11

## 2021-08-09 RX ADMIN — ENOXAPARIN SODIUM 40 MILLIGRAM(S): 100 INJECTION SUBCUTANEOUS at 18:08

## 2021-08-09 RX ADMIN — Medication 81 MILLIGRAM(S): at 18:08

## 2021-08-09 RX ADMIN — ESCITALOPRAM OXALATE 20 MILLIGRAM(S): 10 TABLET, FILM COATED ORAL at 18:09

## 2021-08-09 RX ADMIN — Medication 1 TABLET(S): at 18:09

## 2021-08-09 NOTE — CONSULT NOTE ADULT - ASSESSMENT
Echo 6/27/18L EF 60-65%, min MR, nl LV sys fx     a/p     52yo F with hx of  atrial tachycardia from her magalis terminalis in 10/16/2012 was found to have dual AV nodes but no AVNRT, SVT s/p ablation, bradycardia, s/p PPM (6/2018 -[facility]),  presents with left sided chest pain.    # atypical chest pain   -pleuritic and reproducible on exam   -check ecg , trop x 1 neg   -?msk. r/o pericarditis   -check d-dimer- consider rule out PE given atypical symptoms   -check Echo   -chest xray with trace left pleural effusion   -EP eval to interrogate device   -will consider stress pending the above   -trail NSAIDs   -add low dose asa

## 2021-08-09 NOTE — ED ADULT NURSE REASSESSMENT NOTE - NS ED NURSE REASSESS COMMENT FT1
Pt clicked RTM, awaiting tele bed. Pt updated on plan of care, fall and safety precautions in place, call bell within reach, pt on cardiac monitor.

## 2021-08-09 NOTE — CONSULT NOTE ADULT - TIME BILLING
Agree with above NP note.  51 year old F with hx of magalis term atrial tachycardia 10/16/2012, SVT s/p ablation, bradycardia, s/p PPM (6/2018 -[facility]) admitted with chest pain    #atypical chest pain   -pleuritic and reproducible   -trop negative, trend trop   -check Echo   -EP eval to interrogate device   -consider stress pending the above   -trail NSAIDs for poss msk etiology

## 2021-08-09 NOTE — PATIENT PROFILE ADULT - NSASFALLATTEMPTOOB_GEN_A_NUR
no Mild elevation of LFTs  - likely in setting of infection  - trend CMP, especially if starting remdesevir pending COVID PCR

## 2021-08-09 NOTE — H&P ADULT - HISTORY OF PRESENT ILLNESS
52yo F with hx of bradycardia s/p PPM (2018), SVT s/p ablation presents with left sided chest pain. CP is stabbing and pressure likely, radiating to back on Saturday. Endorsed SOB with exertion with dizziness. Today, CP started also radiating to left arm and left neck. Both Sat and Sun, she took 3 tabs of motrin, assuming MSK related but minimal relief. No diaphoresis or PPM firing. SOB even when speaking and states her symptoms similar in 2018 when she was bradycardiac. No hx of CAD, MI, stroke, nonsmoker

## 2021-08-09 NOTE — CONSULT NOTE ADULT - SUBJECTIVE AND OBJECTIVE BOX
CARDIOLOGY CONSULT - Dr. Marie         HPI:   52yo F with hx of  atrial tachycardia from her magalis terminalis in 10/16/2012 was found to have dual AV nodes but no AVNRT, SVT s/p ablation, bradycardia, s/p PPM (2018 -[facility]),  presents with left sided chest pain. CP started a few days ago. Pain described as  pressure, sharp around her PPM site- worse when sitting up, radiating to back on Saturday- First episode started at rest- with no aggravating factors. She Endorsed SOB with exertion with dizziness. Today, CP started also radiating to left arm and left neck. She initially thought is was MSK (similar episodes in the past usually relieved with NSAIDs). She took 3 tabs of motrin, with minimal relief. No hx of CAD, MI, stroke, nonsmoker . no recent  illness, denies fever chill. Pain is also pleuritic. Echo 18L EF 60-65%, min MR, nl LV sys fx . Sees Dr. Orta as oupt   ROS otherwise negative       PAST MEDICAL & SURGICAL HISTORY:  SVT (Supraventricular Tachycardia)    Migraine  with menstrual cycle    Bradycardia    Anxiety     delivery delivered  16 years ago    History of loop recorder            PREVIOUS DIAGNOSTIC TESTING:    Echo 18L EF 60-65%, min MR, nl LV sys fx     MEDICATIONS:  Home Medications:  escitalopram 20 mg oral tablet: 1 tab(s) orally once a day (09 Aug 2021 14:39)  multivitamin: 1 tab(s) orally once a day (09 Aug 2021 14:39)  Xanax 0.5 mg oral tablet: 1 tab(s) orally , As Needed (09 Aug 2021 14:39)      MEDICATIONS  (STANDING):  enoxaparin Injectable 40 milliGRAM(s) SubCutaneous daily  escitalopram 20 milliGRAM(s) Oral daily  multivitamin 1 Tablet(s) Oral daily      FAMILY HISTORY:  No pertinent family history in first degree relatives        SOCIAL HISTORY:    [ x] Non-smoker  [ ] Smoker  [ ] Alcohol    Allergies    bacitracin (Rash)    Intolerances    	    REVIEW OF SYSTEMS:  CONSTITUTIONAL: No fever, weight loss, or fatigue  EYES: No eye pain, visual disturbances, or discharge  ENMT:  No difficulty hearing, tinnitus, vertigo; No sinus or throat pain  NECK: No pain or stiffness  RESPIRATORY: No cough, wheezing, chills or hemoptysis; No Shortness of Breath  CARDIOVASCULAR:  see above    GASTROINTESTINAL: No abdominal or epigastric pain. No nausea, vomiting, or hematemesis; No diarrhea or constipation. No melena or hematochezia.  GENITOURINARY: No dysuria, frequency, hematuria, or incontinence  NEUROLOGICAL: No headaches, memory loss, loss of strength, numbness, or tremors  SKIN: No itching, burning, rashes, or lesions   	    [ x] All others negative	  [ ] Unable to obtain    PHYSICAL EXAM:  T(C): 37.2 (21 @ 11:59), Max: 37.2 (21 @ 11:59)  HR: 67 (21 @ 11:59) (67 - 88)  BP: 118/79 (21 @ 11:59) (108/73 - 140/78)  RR: 18 (21 @ 11:59) (18 - 22)  SpO2: 97% (21 @ 11:59) (96% - 100%)  Wt(kg): --  I&O's Summary      Appearance: Normal	  Psychiatry: A & O x 3, Mood & affect appropriate  HEENT:   Normal oral mucosa, PERRL, EOMI	  Lymphatic: No lymphadenopathy  Cardiovascular: Normal S1 S2,RRR, No JVD, No murmurs  Respiratory: Lungs clear to auscultation	  Gastrointestinal:  Soft, Non-tender, + BS	  Skin: No rashes, No ecchymoses, No cyanosis	  Neurologic: Non-focal  Extremities: Normal range of motion, No clubbing, cyanosis or edema  Vascular: Peripheral pulses palpable 2+ bilaterally    TELEMETRY: a paced 	    ECG:  	pending   RADIOLOGY:    < from: Xray Chest 1 View AP/PA (21 @ 23:12) >  COMPARISON: Chest radiograph from 2019.    FINDINGS:    Left-sided dual-chamber pacemaker.    The heart is normal in size.    The lungs are clear.    IMPRESSION:    Clear lungs.    Trace left pleural effusion.        < end of copied text >    OTHER: 	  	  LABS:	 	    CARDIAC MARKERS:  Troponin T, High Sensitivity Result: <6 ng/L ( @ 23:16)                                  12.6   5.07  )-----------( 201      ( 08 Aug 2021 23:16 )             39.1     08-08    138  |  104  |  10  ----------------------------<  86  3.6   |  23  |  0.95    Ca    9.1      08 Aug 2021 23:16  Phos  2.3     08-08  Mg     1.9         TPro  7.0  /  Alb  3.9  /  TBili  0.2  /  DBili  x   /  AST  22  /  ALT  13  /  AlkPhos  92        proBNP:   Lipid Profile:   HgA1c:   TSH:

## 2021-08-09 NOTE — H&P ADULT - NSHPLABSRESULTS_GEN_ALL_CORE
LABS:                        12.6   5.07  )-----------( 201      ( 08 Aug 2021 23:16 )             39.1     08-08    138  |  104  |  10  ----------------------------<  86  3.6   |  23  |  0.95    Ca    9.1      08 Aug 2021 23:16  Phos  2.3     08-08  Mg     1.9     08-08    TPro  7.0  /  Alb  3.9  /  TBili  0.2  /  DBili  x   /  AST  22  /  ALT  13  /  AlkPhos  92  08-08              RADIOLOGY & ADDITIONAL TESTS:

## 2021-08-09 NOTE — ED ADULT TRIAGE NOTE - DATE OF LAST VACCINATION
08-Apr-2021 Dapsone Pregnancy And Lactation Text: This medication is Pregnancy Category C and is not considered safe during pregnancy or breast feeding.

## 2021-08-09 NOTE — H&P ADULT - ASSESSMENT
50yo F with hx of bradycardia s/p PPM (2018), SVT s/p ablation presents with left sided chest pain. CP is stabbing and pressure likely, radiating to back on Saturday. Endorsed SOB with exertion with dizziness. Today, CP started also radiating to left arm and left neck. Both Sat and Sun, she took 3 tabs of motrin, assuming MSK related but minimal relief. No diaphoresis or PPM firing. SOB even when speaking and states her symptoms similar in 2018 when she was bradycardiac. No hx of CAD, MI, stroke, nonsmoker    chest pain  - tele monitor  - echo  - cardio consult for poss stress test vs cath    bradycardia  - s/p ppm    anxiety/depression  - cont lexapro  - xanax prn    dvt px

## 2021-08-09 NOTE — H&P ADULT - NSICDXPASTMEDICALHX_GEN_ALL_CORE_FT
PAST MEDICAL HISTORY:  Anxiety     Bradycardia     Migraine with menstrual cycle    SVT (Supraventricular Tachycardia)

## 2021-08-10 LAB
ANION GAP SERPL CALC-SCNC: 9 MMOL/L — SIGNIFICANT CHANGE UP (ref 5–17)
BUN SERPL-MCNC: 15 MG/DL — SIGNIFICANT CHANGE UP (ref 7–23)
CALCIUM SERPL-MCNC: 8.8 MG/DL — SIGNIFICANT CHANGE UP (ref 8.4–10.5)
CHLORIDE SERPL-SCNC: 107 MMOL/L — SIGNIFICANT CHANGE UP (ref 96–108)
CHOLEST SERPL-MCNC: 163 MG/DL — SIGNIFICANT CHANGE UP
CK MB CFR SERPL CALC: <1 NG/ML — SIGNIFICANT CHANGE UP (ref 0–3.8)
CK SERPL-CCNC: 64 U/L — SIGNIFICANT CHANGE UP (ref 25–170)
CO2 SERPL-SCNC: 21 MMOL/L — LOW (ref 22–31)
COVID-19 SPIKE DOMAIN AB INTERP: POSITIVE
COVID-19 SPIKE DOMAIN ANTIBODY RESULT: >250 U/ML — HIGH
CREAT SERPL-MCNC: 0.93 MG/DL — SIGNIFICANT CHANGE UP (ref 0.5–1.3)
FOLATE SERPL-MCNC: >20 NG/ML — SIGNIFICANT CHANGE UP
GLUCOSE SERPL-MCNC: 90 MG/DL — SIGNIFICANT CHANGE UP (ref 70–99)
HCT VFR BLD CALC: 38.1 % — SIGNIFICANT CHANGE UP (ref 34.5–45)
HDLC SERPL-MCNC: 54 MG/DL — SIGNIFICANT CHANGE UP
HGB BLD-MCNC: 12.2 G/DL — SIGNIFICANT CHANGE UP (ref 11.5–15.5)
LIPID PNL WITH DIRECT LDL SERPL: 96 MG/DL — SIGNIFICANT CHANGE UP
MAGNESIUM SERPL-MCNC: 2 MG/DL — SIGNIFICANT CHANGE UP (ref 1.6–2.6)
MCHC RBC-ENTMCNC: 28.8 PG — SIGNIFICANT CHANGE UP (ref 27–34)
MCHC RBC-ENTMCNC: 32 GM/DL — SIGNIFICANT CHANGE UP (ref 32–36)
MCV RBC AUTO: 89.9 FL — SIGNIFICANT CHANGE UP (ref 80–100)
NON HDL CHOLESTEROL: 109 MG/DL — SIGNIFICANT CHANGE UP
NRBC # BLD: 0 /100 WBCS — SIGNIFICANT CHANGE UP (ref 0–0)
PHOSPHATE SERPL-MCNC: 3.7 MG/DL — SIGNIFICANT CHANGE UP (ref 2.5–4.5)
PLATELET # BLD AUTO: 172 K/UL — SIGNIFICANT CHANGE UP (ref 150–400)
POTASSIUM SERPL-MCNC: 3.9 MMOL/L — SIGNIFICANT CHANGE UP (ref 3.5–5.3)
POTASSIUM SERPL-SCNC: 3.9 MMOL/L — SIGNIFICANT CHANGE UP (ref 3.5–5.3)
RBC # BLD: 4.24 M/UL — SIGNIFICANT CHANGE UP (ref 3.8–5.2)
RBC # FLD: 13.5 % — SIGNIFICANT CHANGE UP (ref 10.3–14.5)
SARS-COV-2 IGG+IGM SERPL QL IA: >250 U/ML — HIGH
SARS-COV-2 IGG+IGM SERPL QL IA: POSITIVE
SODIUM SERPL-SCNC: 137 MMOL/L — SIGNIFICANT CHANGE UP (ref 135–145)
TRIGL SERPL-MCNC: 64 MG/DL — SIGNIFICANT CHANGE UP
TROPONIN T, HIGH SENSITIVITY RESULT: <6 NG/L — SIGNIFICANT CHANGE UP (ref 0–51)
TSH SERPL-MCNC: 1.55 UIU/ML — SIGNIFICANT CHANGE UP (ref 0.27–4.2)
VIT B12 SERPL-MCNC: 970 PG/ML — SIGNIFICANT CHANGE UP (ref 232–1245)
WBC # BLD: 5.39 K/UL — SIGNIFICANT CHANGE UP (ref 3.8–10.5)
WBC # FLD AUTO: 5.39 K/UL — SIGNIFICANT CHANGE UP (ref 3.8–10.5)

## 2021-08-10 PROCEDURE — 71275 CT ANGIOGRAPHY CHEST: CPT | Mod: 26

## 2021-08-10 PROCEDURE — 93306 TTE W/DOPPLER COMPLETE: CPT | Mod: 26

## 2021-08-10 PROCEDURE — 93010 ELECTROCARDIOGRAM REPORT: CPT

## 2021-08-10 RX ORDER — KETOROLAC TROMETHAMINE 30 MG/ML
15 SYRINGE (ML) INJECTION ONCE
Refills: 0 | Status: DISCONTINUED | OUTPATIENT
Start: 2021-08-10 | End: 2021-08-10

## 2021-08-10 RX ORDER — KETOROLAC TROMETHAMINE 30 MG/ML
30 SYRINGE (ML) INJECTION EVERY 6 HOURS
Refills: 0 | Status: DISCONTINUED | OUTPATIENT
Start: 2021-08-10 | End: 2021-08-10

## 2021-08-10 RX ORDER — ACETAMINOPHEN 500 MG
1000 TABLET ORAL ONCE
Refills: 0 | Status: COMPLETED | OUTPATIENT
Start: 2021-08-10 | End: 2021-08-10

## 2021-08-10 RX ADMIN — Medication 15 MILLIGRAM(S): at 14:20

## 2021-08-10 RX ADMIN — Medication 400 MILLIGRAM(S): at 21:16

## 2021-08-10 RX ADMIN — Medication 15 MILLIGRAM(S): at 13:50

## 2021-08-10 RX ADMIN — ENOXAPARIN SODIUM 40 MILLIGRAM(S): 100 INJECTION SUBCUTANEOUS at 11:31

## 2021-08-10 RX ADMIN — Medication 81 MILLIGRAM(S): at 11:36

## 2021-08-10 RX ADMIN — ESCITALOPRAM OXALATE 20 MILLIGRAM(S): 10 TABLET, FILM COATED ORAL at 21:16

## 2021-08-10 RX ADMIN — Medication 1 TABLET(S): at 11:36

## 2021-08-10 RX ADMIN — Medication 1000 MILLIGRAM(S): at 22:16

## 2021-08-10 NOTE — PROGRESS NOTE ADULT - TIME BILLING
Patient seen and examined, agree with the above assessment and plan by ANA Ceron.  - chest pain pleuritic and reproducible on exam   -ecg paced with no acute ischemia , trop x 1 neg   -?msk. r/o pericarditis   -check d-dimer- would check CT chest w contrast to r/o PE given atypical symptoms   -check Echo   -chest xray with trace left pleural effusion   -EP eval to interrogate device   -will consider stress pending the above   -trial NSAIDs   -C/W ASA

## 2021-08-10 NOTE — CHART NOTE - NSCHARTNOTEFT_GEN_A_CORE
Reported by RN patient's c/o chest pain  Seen patient at bedside - pt reports pain started after pt used the BR for wash up. Pain is on left chest radiating to back constant sharp pain 8/10 and reproducible - worse on palpation and laying on her back - improved when laying on her right side. Denies SOB, nausea, vomiting, diaphoresis    Vital Signs Last 24 Hrs  T(C): 36.8 (10 Aug 2021 04:24), Max: 37.2 (09 Aug 2021 11:59)  HR: 67 (10 Aug 2021 04:24) (64 - 70)  BP: 115/71 (10 Aug 2021 04:24) (108/73 - 118/79)  RR: 18 (10 Aug 2021 04:24) (17 - 18)  SpO2: 99% (10 Aug 2021 04:24) (97% - 99%)                          12.2   5.39  )-----------( 172      ( 10 Aug 2021 05:19 )             38.1   08-10    137  |  107  |  15  ----------------------------<  90  3.9   |  21<L>  |  0.93    Ca    8.8      10 Aug 2021 05:22  Phos  3.7     08-10  Mg     2.0     08-10    TPro  7.0  /  Alb  3.9  /  TBili  0.2  /  DBili  x   /  AST  22  /  ALT  13  /  AlkPhos  92  08-08    PHYSICAL EXAM:  GENERAL: NAD, well-developed, AAOx3  HEAD:  Atraumatic, Normocephalic  EYES: EOMI, PERRLA, conjunctiva and sclera clear  NECK: Supple, No JVD  CHEST/LUNG: Clear to auscultation bilaterally; No wheeze, tender on left anterior chest wall, reproducible on exam   HEART: Regular rate and rhythm; No murmurs, rubs, or gallops  ABDOMEN: Soft, Nontender, Nondistended; Bowel sounds present  EXTREMITIES:  2+ Peripheral Pulses, No clubbing, cyanosis, or edema    52yo F with hx of  atrial tachycardia from her magalis terminalis in 10/16/2012 was found to have dual AV nodes but no AVNRT, SVT s/p ablation, bradycardia, s/p PPM (6/2018 -[facility]),  admitted with chest pain r/o ACS  Now with recurrence of chest pain likely Musculoskeletal    Plan - EKG - paced rate 66/mt            Follow up CE           ECHO pending    [id number]

## 2021-08-11 ENCOUNTER — TRANSCRIPTION ENCOUNTER (OUTPATIENT)
Age: 51
End: 2021-08-11

## 2021-08-11 VITALS
HEART RATE: 76 BPM | OXYGEN SATURATION: 97 % | RESPIRATION RATE: 18 BRPM | TEMPERATURE: 99 F | DIASTOLIC BLOOD PRESSURE: 91 MMHG | SYSTOLIC BLOOD PRESSURE: 133 MMHG

## 2021-08-11 PROCEDURE — 82550 ASSAY OF CK (CPK): CPT

## 2021-08-11 PROCEDURE — 80053 COMPREHEN METABOLIC PANEL: CPT

## 2021-08-11 PROCEDURE — 80061 LIPID PANEL: CPT

## 2021-08-11 PROCEDURE — 71045 X-RAY EXAM CHEST 1 VIEW: CPT

## 2021-08-11 PROCEDURE — 82607 VITAMIN B-12: CPT

## 2021-08-11 PROCEDURE — 82746 ASSAY OF FOLIC ACID SERUM: CPT

## 2021-08-11 PROCEDURE — 80048 BASIC METABOLIC PNL TOTAL CA: CPT

## 2021-08-11 PROCEDURE — 93005 ELECTROCARDIOGRAM TRACING: CPT

## 2021-08-11 PROCEDURE — 85027 COMPLETE CBC AUTOMATED: CPT

## 2021-08-11 PROCEDURE — 84702 CHORIONIC GONADOTROPIN TEST: CPT

## 2021-08-11 PROCEDURE — 96372 THER/PROPH/DIAG INJ SC/IM: CPT

## 2021-08-11 PROCEDURE — 84443 ASSAY THYROID STIM HORMONE: CPT

## 2021-08-11 PROCEDURE — 86769 SARS-COV-2 COVID-19 ANTIBODY: CPT

## 2021-08-11 PROCEDURE — 82553 CREATINE MB FRACTION: CPT

## 2021-08-11 PROCEDURE — 87635 SARS-COV-2 COVID-19 AMP PRB: CPT

## 2021-08-11 PROCEDURE — 84100 ASSAY OF PHOSPHORUS: CPT

## 2021-08-11 PROCEDURE — 71275 CT ANGIOGRAPHY CHEST: CPT

## 2021-08-11 PROCEDURE — 99285 EMERGENCY DEPT VISIT HI MDM: CPT

## 2021-08-11 PROCEDURE — 93306 TTE W/DOPPLER COMPLETE: CPT

## 2021-08-11 PROCEDURE — 83735 ASSAY OF MAGNESIUM: CPT

## 2021-08-11 PROCEDURE — 84484 ASSAY OF TROPONIN QUANT: CPT

## 2021-08-11 RX ORDER — IBUPROFEN 200 MG
600 TABLET ORAL THREE TIMES A DAY
Refills: 0 | Status: DISCONTINUED | OUTPATIENT
Start: 2021-08-11 | End: 2021-08-11

## 2021-08-11 RX ORDER — IBUPROFEN 200 MG
1 TABLET ORAL
Qty: 42 | Refills: 0
Start: 2021-08-11 | End: 2021-08-24

## 2021-08-11 RX ORDER — ASPIRIN/CALCIUM CARB/MAGNESIUM 324 MG
1 TABLET ORAL
Qty: 30 | Refills: 0
Start: 2021-08-11 | End: 2021-09-09

## 2021-08-11 RX ADMIN — ENOXAPARIN SODIUM 40 MILLIGRAM(S): 100 INJECTION SUBCUTANEOUS at 12:29

## 2021-08-11 RX ADMIN — Medication 81 MILLIGRAM(S): at 12:29

## 2021-08-11 RX ADMIN — ESCITALOPRAM OXALATE 20 MILLIGRAM(S): 10 TABLET, FILM COATED ORAL at 12:29

## 2021-08-11 RX ADMIN — Medication 1 TABLET(S): at 12:29

## 2021-08-11 NOTE — PROGRESS NOTE ADULT - SUBJECTIVE AND OBJECTIVE BOX
CARDIOLOGY FOLLOW UP - Dr. Marie  DATE OF SERVICE: 08-11-21     CC no cp/sob  chest pain/ improved with Toradol     REVIEW OF SYSTEMS:   CONSTITUTIONAL: No fever, weight loss, or fatigue   RESPIRATORY:  No cough, wheezing, chills or hemoptysis; No SOB  CARDIOVASCULAR: No chest pain, palpitations, passing out, dizziness, or leg swelling   GASTROINTESTINAL: No abdominal or epigastric pain. No nausea, vomiting, or hematemesis, no diarreha, or constipation, No melena or hematochezia   VASCULAR: no edema.       PHYSICAL EXAM:  T(C): 37.4 (08-11-21 @ 11:38), Max: 37.4 (08-11-21 @ 11:38)  HR: 76 (08-11-21 @ 11:38) (70 - 78)  BP: 133/91 (08-11-21 @ 11:38) (117/79 - 133/91)  RR: 18 (08-11-21 @ 11:38) (18 - 18)  SpO2: 97% (08-11-21 @ 11:38) (97% - 97%)  Wt(kg): --  I&O's Summary    10 Aug 2021 07:01  -  11 Aug 2021 07:00  --------------------------------------------------------  IN: 420 mL / OUT: 0 mL / NET: 420 mL        Appearance: Normal	  Cardiovascular: Normal S1 S2,RRR, No JVD, No murmurs  Respiratory: Lungs clear to auscultation	  Gastrointestinal:  Soft, Non-tender, + BS	  Extremities: Normal range of motion, No clubbing, cyanosis or edema      HOME MEDICATIONS:  escitalopram 20 mg oral tablet: 1 tab(s) orally once a day (09 Aug 2021 14:39)  multivitamin: 1 tab(s) orally once a day (09 Aug 2021 14:39)  Xanax 0.5 mg oral tablet: 1 tab(s) orally , As Needed (09 Aug 2021 14:39)      MEDICATIONS  (STANDING):  aspirin  chewable 81 milliGRAM(s) Oral daily  enoxaparin Injectable 40 milliGRAM(s) SubCutaneous daily  escitalopram 20 milliGRAM(s) Oral daily  multivitamin 1 Tablet(s) Oral daily      TELEMETRY: paced     ECG:  	  RADIOLOGY:   DIAGNOSTIC TESTING:  [ ] Echocardiogram: < from: Transthoracic Echocardiogram (08.10.21 @ 13:40) >  Conclusions:  1. Normal mitral valve. Minimal mitral regurgitation.  2. Normal trileaflet aortic valve. No aortic valve  regurgitation seen.  3. Normal left ventricular systolic function. No segmental  wall motion abnormalities. Septal motion consistentwith  paced rhythm.  4. Normal right ventricular size and function. A device  wire is noted in the right heart.  *** Compared with echocardiogram of 6/27/2018, no  significant changes noted.    < end of copied text >    [ ]  Catheterization:  [ ] Stress Test:    OTHER: 	    LABS:	 	                                12.2   5.39  )-----------( 172      ( 10 Aug 2021 05:19 )             38.1     08-10    137  |  107  |  15  ----------------------------<  90  3.9   |  21<L>  |  0.93    Ca    8.8      10 Aug 2021 05:22  Phos  3.7     08-10  Mg     2.0     08-10        
DATE OF SERVICE: 08-11-21 @ 16:35    Patient is a 51y old  Female who presents with a chief complaint of chest pain and sob (11 Aug 2021 13:50)      SUBJECTIVE / OVERNIGHT EVENTS:   feeling much better    MEDICATIONS  (STANDING):  aspirin  chewable 81 milliGRAM(s) Oral daily  enoxaparin Injectable 40 milliGRAM(s) SubCutaneous daily  escitalopram 20 milliGRAM(s) Oral daily  multivitamin 1 Tablet(s) Oral daily    MEDICATIONS  (PRN):  ALPRAZolam 0.5 milliGRAM(s) Oral daily PRN anxiety  ibuprofen  Tablet. 600 milliGRAM(s) Oral three times a day PRN Mild Pain (1 - 3)      Vital Signs Last 24 Hrs  T(C): 37.4 (11 Aug 2021 11:38), Max: 37.4 (11 Aug 2021 11:38)  T(F): 99.3 (11 Aug 2021 11:38), Max: 99.3 (11 Aug 2021 11:38)  HR: 76 (11 Aug 2021 11:38) (70 - 78)  BP: 133/91 (11 Aug 2021 11:38) (117/79 - 133/91)  BP(mean): --  RR: 18 (11 Aug 2021 11:38) (18 - 18)  SpO2: 97% (11 Aug 2021 11:38) (97% - 97%)  CAPILLARY BLOOD GLUCOSE        I&O's Summary    10 Aug 2021 07:01  -  11 Aug 2021 07:00  --------------------------------------------------------  IN: 420 mL / OUT: 0 mL / NET: 420 mL        PHYSICAL EXAM:  GENERAL: NAD, well-developed  HEAD:  Atraumatic, Normocephalic  EYES: EOMI, PERRLA, conjunctiva and sclera clear  NECK: Supple, No JVD  CHEST/LUNG: Clear to auscultation bilaterally; No wheeze  HEART: Regular rate and rhythm; No murmurs, rubs, or gallops  ABDOMEN: Soft, Nontender, Nondistended; Bowel sounds present  EXTREMITIES:  2+ Peripheral Pulses, No clubbing, cyanosis, or edema  PSYCH: AAOx3  NEUROLOGY: non-focal  SKIN: No rashes or lesions    LABS:                        12.2   5.39  )-----------( 172      ( 10 Aug 2021 05:19 )             38.1     08-10    137  |  107  |  15  ----------------------------<  90  3.9   |  21<L>  |  0.93    Ca    8.8      10 Aug 2021 05:22  Phos  3.7     08-10  Mg     2.0     08-10        CARDIAC MARKERS ( 10 Aug 2021 10:10 )  x     / x     / 64 U/L / x     / <1.0 ng/mL      ct< from: CT Angio Chest PE Protocol w/ IV Cont (08.10.21 @ 17:00) >  PROCEDURE:  CT Angiography of the Chest.  Sagittal and coronal reformats were performed as well as 3D (MIP) reconstructions.    FINDINGS:    LUNGS AND AIRWAYS: Patent central airways.  Left lower lobe linear atelectasis    PLEURA: No pleural effusion.    MEDIASTINUM AND VINH: No lymphadenopathy.    VESSELS: No pulmonary embolus    HEART: Cardiomegaly. No pericardial effusion.    CHEST WALL AND LOWER NECK: Pectus excavatum deformity. Left chest wall pacemaker with leads in the right atrium and right ventricle.      VISUALIZED UPPER ABDOMEN: Within normal limits.  BONES: Within normal limits.    IMPRESSION:    No pulmonary embolus .      < end of copied text >  < from: Transthoracic Echocardiogram (08.10.21 @ 13:40) >  PROCEDURE: Transthoracic echocardiogram with 2-D, M-Mode  and complete spectral and color flow Doppler.  INDICATION: Dyspnea, unspecified (R06.00)  ------------------------------------------------------------------------  Dimensions:    Normal Values:  LA:     2.4    2.0 - 4.0 cm  Ao:     2.3    2.0 - 3.8 cm  SEPTUM: 0.9    0.6 - 1.2 cm  PWT:    0.9    0.6 - 1.1 cm  LVIDd:  3.5    3.0 - 5.6 cm  LVIDs:  2.1    1.8 - 4.0 cm  Derived variables:  LVMI: 43 g/m2  RWT: 0.51  Fractional short: 40 %  EF (Visual Estimate): 70-75 %  ------------------------------------------------------------------------  Observations:  Mitral Valve: Normal mitral valve. Minimal mitral  regurgitation.  Aortic Valve/Aorta: Normal trileaflet aortic valve. No  aortic valve regurgitation seen.  Aortic Root: 2.3 cm.  Left Atrium: Normal left atrium.  LA volume index = 10  cc/m2.  Left Ventricle: Normal left ventricular systolic function.  No segmental wall motion abnormalities. Septal motion  consistent with paced rhythm. Normal left ventricular  internal dimensions and wall thicknesses. Normal diastolic  function.  Right Heart: Normal right atrium. Normal right ventricular  size and function. A device wire is noted in the right  heart. Normal tricuspid valve. Mild tricuspid  regurgitation. Normal pulmonic valve.  Pericardium/Pleura: Normal pericardium with no pericardial  effusion.  Hemodynamic: Estimated right ventricular systolic pressure  equals 17 mm Hg, assuming right atrial pressure equals 3 mm  Hg, consistent with normal pulmonary pressures.  ------------------------------------------------------------------------  Conclusions:  1. Normal mitral valve. Minimal mitral regurgitation.  2. Normal trileaflet aortic valve. No aortic valve  regurgitation seen.  3. Normal left ventricular systolic function. No segmental  wall motion abnormalities. Septal motion consistentwith  paced rhythm.  4. Normal right ventricular size and function. A device  wire is noted in the right heart.  *** Compared with echocardiogram of 6/27/2018, no  significant changes noted.  ------------------------------------------------------------------------    < end of copied text >      RADIOLOGY & ADDITIONAL TESTS:    Imaging Personally Reviewed:    Consultant(s) Notes Reviewed:      Care Discussed with Consultants/Other Providers:  
CARDIOLOGY FOLLOW UP - Dr. Marie  DATE OF SERVICE: 08-10-21     CC pt. complaining of reproducible chest pain   worse on palpation and laying on her back , also complains of fatigue, no SOB .   ekg is paced 66 with no acute chages.     REVIEW OF SYSTEMS:   CONSTITUTIONAL: No fever, weight loss, or fatigue   RESPIRATORY:  No cough, wheezing, chills or hemoptysis; No SOB  CARDIOVASCULAR: + chest pain, palpitations, passing out, dizziness, or leg swelling   GASTROINTESTINAL: No abdominal or epigastric pain. No nausea, vomiting, or hematemesis, no diarreha, or constipation, No melena or hematochezia   VASCULAR: no edema.       PHYSICAL EXAM:  T(C): 36.8 (08-10-21 @ 04:24), Max: 37.2 (08-09-21 @ 11:59)  HR: 67 (08-10-21 @ 04:24) (64 - 70)  BP: 115/71 (08-10-21 @ 04:24) (108/73 - 118/79)  RR: 18 (08-10-21 @ 04:24) (17 - 18)  SpO2: 99% (08-10-21 @ 04:24) (97% - 99%)  Wt(kg): --  I&O's Summary    09 Aug 2021 07:01  -  10 Aug 2021 07:00  --------------------------------------------------------  IN: 100 mL / OUT: 0 mL / NET: 100 mL        Appearance: Normal	  Cardiovascular: Normal S1 S2,RRR, No JVD, No murmurs  Respiratory: Lungs clear to auscultation	  Gastrointestinal:  Soft, Non-tender, + BS	  Extremities: Normal range of motion, No clubbing, cyanosis or edema      HOME MEDICATIONS:  escitalopram 20 mg oral tablet: 1 tab(s) orally once a day (09 Aug 2021 14:39)  multivitamin: 1 tab(s) orally once a day (09 Aug 2021 14:39)  Xanax 0.5 mg oral tablet: 1 tab(s) orally , As Needed (09 Aug 2021 14:39)      MEDICATIONS  (STANDING):  aspirin  chewable 81 milliGRAM(s) Oral daily  enoxaparin Injectable 40 milliGRAM(s) SubCutaneous daily  escitalopram 20 milliGRAM(s) Oral daily  multivitamin 1 Tablet(s) Oral daily      TELEMETRY: NSR, 60, apaced     ECG:  	  RADIOLOGY:   DIAGNOSTIC TESTING:  [ ] Echocardiogram:   [ ]  Catheterization:   [ ] Stress Test:    OTHER: 	    LABS:	 	                                12.2   5.39  )-----------( 172      ( 10 Aug 2021 05:19 )             38.1     08-10    137  |  107  |  15  ----------------------------<  90  3.9   |  21<L>  |  0.93    Ca    8.8      10 Aug 2021 05:22  Phos  3.7     08-10  Mg     2.0     08-10    TPro  7.0  /  Alb  3.9  /  TBili  0.2  /  DBili  x   /  AST  22  /  ALT  13  /  AlkPhos  92  08-08      
DATE OF SERVICE: 08-10-21 @ 12:57    Patient is a 51y old  Female who presents with a chief complaint of chest pain and sob (10 Aug 2021 11:19)      SUBJECTIVE / OVERNIGHT EVENTS:  worsening pain worse on movement and palpation of left chest wall    MEDICATIONS  (STANDING):  aspirin  chewable 81 milliGRAM(s) Oral daily  enoxaparin Injectable 40 milliGRAM(s) SubCutaneous daily  escitalopram 20 milliGRAM(s) Oral daily  multivitamin 1 Tablet(s) Oral daily    MEDICATIONS  (PRN):  ALPRAZolam 0.5 milliGRAM(s) Oral daily PRN anxiety  ketorolac   Injectable 30 milliGRAM(s) IV Push every 6 hours PRN Moderate Pain (4 - 6)      Vital Signs Last 24 Hrs  T(C): 36.8 (10 Aug 2021 12:00), Max: 37.1 (09 Aug 2021 15:03)  T(F): 98.2 (10 Aug 2021 12:00), Max: 98.7 (09 Aug 2021 15:03)  HR: 71 (10 Aug 2021 12:00) (64 - 71)  BP: 116/81 (10 Aug 2021 12:00) (108/73 - 116/81)  BP(mean): --  RR: 18 (10 Aug 2021 12:00) (17 - 18)  SpO2: 97% (10 Aug 2021 12:00) (97% - 99%)  CAPILLARY BLOOD GLUCOSE        I&O's Summary    09 Aug 2021 07:01  -  10 Aug 2021 07:00  --------------------------------------------------------  IN: 100 mL / OUT: 0 mL / NET: 100 mL        PHYSICAL EXAM:  GENERAL: NAD, well-developed  HEAD:  Atraumatic, Normocephalic  EYES: EOMI, PERRLA, conjunctiva and sclera clear  NECK: Supple, No JVD  CHEST/LUNG: Clear to auscultation bilaterally; No wheeze  HEART: Regular rate and rhythm; No murmurs, rubs, or gallops  ABDOMEN: Soft, Nontender, Nondistended; Bowel sounds present  EXTREMITIES:  2+ Peripheral Pulses, No clubbing, cyanosis, or edema  PSYCH: AAOx3  NEUROLOGY: non-focal  SKIN: No rashes or lesions    LABS:                        12.2   5.39  )-----------( 172      ( 10 Aug 2021 05:19 )             38.1     08-10    137  |  107  |  15  ----------------------------<  90  3.9   |  21<L>  |  0.93    Ca    8.8      10 Aug 2021 05:22  Phos  3.7     08-10  Mg     2.0     08-10    TPro  7.0  /  Alb  3.9  /  TBili  0.2  /  DBili  x   /  AST  22  /  ALT  13  /  AlkPhos  92  08-08      CARDIAC MARKERS ( 10 Aug 2021 10:10 )  x     / x     / 64 U/L / x     / <1.0 ng/mL  CARDIAC MARKERS ( 08 Aug 2021 23:16 )  x     / x     / 83 U/L / x     / 1.0 ng/mL          RADIOLOGY & ADDITIONAL TESTS:    Imaging Personally Reviewed:    Consultant(s) Notes Reviewed:      Care Discussed with Consultants/Other Providers:

## 2021-08-11 NOTE — DISCHARGE NOTE PROVIDER - NSDCMRMEDTOKEN_GEN_ALL_CORE_FT
aspirin 81 mg oral tablet, chewable: 1 tab(s) orally once a day  escitalopram 20 mg oral tablet: 1 tab(s) orally once a day  ibuprofen 800 mg oral tablet: 1 tab(s) orally every 8 hours   multivitamin: 1 tab(s) orally once a day  Xanax 0.5 mg oral tablet: 1 tab(s) orally , As Needed

## 2021-08-11 NOTE — DISCHARGE NOTE PROVIDER - CARE PROVIDER_API CALL
CAYDEN ARGUETA  Internal Medicine  [address] [place] [id number]  Phone: ([phone]  Fax: ()-  Follow Up Time: 2 weeks

## 2021-08-11 NOTE — DISCHARGE NOTE PROVIDER - HOSPITAL COURSE
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 CP is stabbing and pressure likely, radiating to back on Saturday. Endorsed SOB with exertion with dizziness. Today, CP started also radiating to left arm and left neck. Both Sat and Sun, she took 3 tabs of motrin, assuming MSK related but minimal relief. No diaphoresis or PPM firing. SOB even when speaking and states her symptoms similar in 2018 when she was bradycardiac. No hx of CAD, MI, stroke, nonsmoker.   Troponin, high sensitivity <6 x2. EKG paced with no acute ischemia. CT Chest negative for PE.   TTE with normal LV function. CXR with trace L pleural effusion.   Pain reproducible on exam.   PPM recently interrogated. No need for repeat per EP.   No further ischemic inpatient workup.   c/w ASA and Motrin.   Pt medically stable for discharge.

## 2021-08-11 NOTE — DISCHARGE NOTE PROVIDER - NSDCCPCAREPLAN_GEN_ALL_CORE_FT
PRINCIPAL DISCHARGE DIAGNOSIS  Diagnosis: Costochondritis  Assessment and Plan of Treatment: Please take Motrin 800 mg 3 x/day as needed for pain. You can alternate with Tylenol 1000mg every 8 hours as needed. Avoid heavy lifting or straining and can use heat alternating with ice for increased pain relief. As discussed, can use Lidocaine over the counter (Icy Hot) for additional pain relief as needed. Follow up with your physician Dr. Orta within 1-2 weeks of discharge.

## 2021-08-11 NOTE — DISCHARGE NOTE PROVIDER - INSTRUCTIONS
I couldn't make it to the peer to peer as my previous meeting rant late    No fluid restrictions. Follow low sodium/salt and low cholesterol/fat diet. Avoid added salt, frozen dinners, canned soups and broths, foods fried in oil, salted/cured meats including ham, rodarte, and other deli meats high in sodium. Eat plenty of fruits, vegetables and whole grains.

## 2021-08-11 NOTE — PROGRESS NOTE ADULT - ASSESSMENT
50yo F with hx of bradycardia s/p PPM (2018), SVT s/p ablation presents with left sided chest pain. CP is stabbing and pressure likely, radiating to back on Saturday. Endorsed SOB with exertion with dizziness. Today, CP started also radiating to left arm and left neck. Both Sat and Sun, she took 3 tabs of motrin, assuming MSK related but minimal relief. No diaphoresis or PPM firing. SOB even when speaking and states her symptoms similar in 2018 when she was bradycardiac. No hx of CAD, MI, stroke, nonsmoker    chest pain  - tele monitor  - echo  - cardio consult following  - toradol for pain  - ct chest to r/o pe vs chest pathology    bradycardia  - s/p ppm    anxiety/depression  - cont lexapro  - xanax prn    dvt px
 52yo F with hx of bradycardia s/p PPM (2018), SVT s/p ablation presents with left sided chest pain. CP is stabbing and pressure likely, radiating to back on Saturday. Endorsed SOB with exertion with dizziness. Today, CP started also radiating to left arm and left neck. Both Sat and Sun, she took 3 tabs of motrin, assuming MSK related but minimal relief. No diaphoresis or PPM firing. SOB even when speaking and states her symptoms similar in 2018 when she was bradycardiac. No hx of CAD, MI, stroke, nonsmoker    chest pain  - tele monitor  - echo done  - cardio consult cleared for d/c  - motrin for pain  - ct chest done    bradycardia  - s/p ppm    anxiety/depression  - cont lexapro  - xanax prn    dvt px    f/o with dr Orta
Echo 6/27/18L EF 60-65%, min MR, nl LV sys fx     a/p     52yo F with hx of  atrial tachycardia from her magalis terminalis in 10/16/2012 was found to have dual AV nodes but no AVNRT, SVT s/p ablation, bradycardia, s/p PPM (6/2018 -[employer]tr[employer]),  presents with left sided chest pain.    # atypical chest pain   -pleuritic and reproducible on exam   -ecg paced with no acute ischemia , trop x 1 neg   -?msk. r/o pericarditis   -check d-dimer- would check CT chest w contrast to r/o PE given atypical symptoms   -check Echo   -chest xray with trace left pleural effusion   -EP eval to interrogate device   -will consider stress pending the above   -trial NSAIDs   -C/W ASA    
Echo 6/27/18L EF 60-65%, min MR, nl LV sys fx   echo 8/10/21 normal LV function.     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    # atypical chest pain   -pleuritic and reproducible on exam   -ecg paced with no acute ischemia, trop x 1 neg   -likely musculoskeletal  -CT chest negative for PE  -echo with normal LV function.    -chest xray with trace left pleural effusion   -NO device interrogation per EP recently interrogated .   -no further inpatient w/u    -C/W ASA      no cv objection to dc planning.

## 2021-09-07 ENCOUNTER — OUTPATIENT (OUTPATIENT)
Dept: OUTPATIENT SERVICES | Facility: HOSPITAL | Age: 51
LOS: 1 days | End: 2021-09-07
Payer: COMMERCIAL

## 2021-09-07 ENCOUNTER — APPOINTMENT (OUTPATIENT)
Dept: ULTRASOUND IMAGING | Facility: CLINIC | Age: 51
End: 2021-09-07
Payer: COMMERCIAL

## 2021-09-07 DIAGNOSIS — Z98.890 OTHER SPECIFIED POSTPROCEDURAL STATES: Chronic | ICD-10-CM

## 2021-09-07 DIAGNOSIS — R07.9 CHEST PAIN, UNSPECIFIED: ICD-10-CM

## 2021-09-07 DIAGNOSIS — R05 COUGH: ICD-10-CM

## 2021-09-07 PROCEDURE — 76604 US EXAM CHEST: CPT | Mod: 26

## 2021-09-07 PROCEDURE — 76604 US EXAM CHEST: CPT

## 2021-11-01 ENCOUNTER — APPOINTMENT (OUTPATIENT)
Dept: ELECTROPHYSIOLOGY | Facility: CLINIC | Age: 51
End: 2021-11-01

## 2021-11-03 ENCOUNTER — NON-APPOINTMENT (OUTPATIENT)
Age: 51
End: 2021-11-03

## 2021-11-04 ENCOUNTER — APPOINTMENT (OUTPATIENT)
Dept: ELECTROPHYSIOLOGY | Facility: CLINIC | Age: 51
End: 2021-11-04
Payer: COMMERCIAL

## 2021-11-04 PROCEDURE — 93296 REM INTERROG EVL PM/IDS: CPT | Mod: NC

## 2021-11-04 PROCEDURE — 93294 REM INTERROG EVL PM/LDLS PM: CPT | Mod: NC

## 2022-01-19 NOTE — ED CDU PROVIDER INITIAL DAY NOTE - CPE EDP RESP NORM
F: none, HD  E: Replete as needed   N: consistent carb/renal diet  Ppx: hep subq  Dispo: [facility] --> per PT cleared for home. Now pending shelter package approval. normal...

## 2022-02-03 ENCOUNTER — NON-APPOINTMENT (OUTPATIENT)
Age: 52
End: 2022-02-03

## 2022-02-03 ENCOUNTER — APPOINTMENT (OUTPATIENT)
Dept: ELECTROPHYSIOLOGY | Facility: CLINIC | Age: 52
End: 2022-02-03
Payer: COMMERCIAL

## 2022-02-03 PROCEDURE — 93294 REM INTERROG EVL PM/LDLS PM: CPT

## 2022-02-03 PROCEDURE — 93296 REM INTERROG EVL PM/IDS: CPT

## 2022-05-05 ENCOUNTER — APPOINTMENT (OUTPATIENT)
Dept: ELECTROPHYSIOLOGY | Facility: CLINIC | Age: 52
End: 2022-05-05
Payer: COMMERCIAL

## 2022-05-05 ENCOUNTER — NON-APPOINTMENT (OUTPATIENT)
Age: 52
End: 2022-05-05

## 2022-05-05 PROCEDURE — 93296 REM INTERROG EVL PM/IDS: CPT

## 2022-05-05 PROCEDURE — 93294 REM INTERROG EVL PM/LDLS PM: CPT

## 2022-08-04 ENCOUNTER — NON-APPOINTMENT (OUTPATIENT)
Age: 52
End: 2022-08-04

## 2022-08-04 ENCOUNTER — APPOINTMENT (OUTPATIENT)
Dept: ELECTROPHYSIOLOGY | Facility: CLINIC | Age: 52
End: 2022-08-04

## 2022-08-04 PROCEDURE — 93296 REM INTERROG EVL PM/IDS: CPT

## 2022-08-04 PROCEDURE — 93294 REM INTERROG EVL PM/LDLS PM: CPT

## 2022-08-16 ENCOUNTER — RESULT REVIEW (OUTPATIENT)
Age: 52
End: 2022-08-16

## 2022-08-16 NOTE — PROGRESS NOTE ADULT - ASSESSMENT
"You should get the new shingles vaccine series \"SHINGRIX\" (not Zostavax) at a pharmacy.     "
47 female with h/o SVT s/p ablation,  migraine admitted with worsening sob/ chest pain / bradycardia     1. Chest pain   with associated SOB likely symptomatic from bradycardia  no evidence of ACS   cardiac enzymes negative   chest xray clear   Ct angio negaive PE   CT coronaries is normal  ECHO w normal LVEF  start low dose ASA     2. Bradycardia , symptomatic  HR improved  can continue to hold    No objection to DC  Recommend outpt neuro eval for left side heaviness
47 female with h/o SVT s/p ablation,  migraine admitted with worsening sob/ chest pain / bradycardia     1. Chest pain   with associated SOB likely symptomatic from bradycardia  no evidence of ACS   cardiac enzymes negative   chest xray clear   Ct angio negaive PE   CT coronaries is normal  ECHO w normal LVEF  start low dose ASA     2. Bradycardia , symptomatic  can continue to hold BB  EP f.u plan for loop recorder     3. Neuro f/u   c.o  left side heaviness  MRI head unremarkable   MRI spine C2-3 - C6-7 mild buldges, + sml disc herniation   neuro f/u - plan for outpt imaging
47 female with h/o SVT s/p ablation,  migraine admitted with worsening sob/ chest pain / bradycardia     1. Chest pain   with associated SOB likely symptomatic from bradycardia  no evidence of ACS   cardiac enzymes negative   chest xray clear   Ct angio negaive PE   CT coronaries is normal  ECHO w normal LVEF  start low dose ASA     2. Bradycardia , symptomatic  can continue to hold BB  per documentation hr as low as 26 over night  c.o symptoms this am   EP eval symptomatic bradycardia     3. Neuro f/u   c.o  left side heaviness  nuero work up in progress
47 year old female with past medical history SVT s/p ablation 2012, on acebutolol post ablation and  migraine headaches  Referred to ED by Dr. Orta (Cardiologist )for complaint of worsening SOB over the last several days and left sided chest pain and numbness radiating to left arm. EKG done in office found heart rate down in the 30's. (States her usual baseline HR 40's)  Pt reports taking last taking Acebutolol last on 3/5, in which she took 2 tabs for thinking her HR was elevated.  Denies any smoking, ETOH use or illicit drug use.   Denies any syncope, CAD, DM or HTN.  Cardiac enzymes x 3 negative. ECHO  WNL.  CTA of the chest is without pulmonary emboli or intrinsic lung disease, noted to have significant pectus excavatum deformity. Lab work WNL. EP consulted for sinus bradycardia.
48yo F with intermittent neck pain, LUE weakness likley secondary to cervical radiculopathy, though it does not explain the lle symptoms. MRI brain negative for ischemia, demyel  Plan  1. may fu with me as outpt for further neuro imaging of spine (thoracic/lumbar) as well as EMG  2. MRI brain and c spine reviewed.
ASSESSMENT    multifactorial shortness of breath    1) chronotropic incompetence due to beta blocker therapy for post SVT ablation tachycardia - nocturnal bradycardia may be exacerbated by obstructive sleep apnea  2) restrictive lung disease due to pectus excavatum - i think that patient's heart beat is accentuated due to the close proximity of the heart to her left anterior rib cage  3) no evidence of myocardial ischemia, valvular heart disease, pulmonary emboli, etc    PLAN/RECOMMENDATIONS    stable oxygenation on room air  bedside spirometry c/w mild restrictive lung disease  observe off pulmonary medications  outpatient full PFTs and sleep study  loop recorder placed - cardiology follow-up  MRI brain normal; MRI cervical spine perhaps c/w cervical radiculopathy    Will follow with you. No pulmonary objection to discharge  Patient will follow-up with Dr. Chinyere Bowser of our practice.    Humberto Ferris MD, [facility] - [phone]  Pulmonary Medicine
47 f a/w cp and sob

## 2022-11-03 ENCOUNTER — APPOINTMENT (OUTPATIENT)
Dept: ELECTROPHYSIOLOGY | Facility: CLINIC | Age: 52
End: 2022-11-03

## 2022-11-03 ENCOUNTER — NON-APPOINTMENT (OUTPATIENT)
Age: 52
End: 2022-11-03

## 2022-11-03 PROCEDURE — 93296 REM INTERROG EVL PM/IDS: CPT

## 2022-11-03 PROCEDURE — 93294 REM INTERROG EVL PM/LDLS PM: CPT

## 2023-01-14 ENCOUNTER — TRANSCRIPTION ENCOUNTER (OUTPATIENT)
Age: 53
End: 2023-01-14

## 2023-01-16 ENCOUNTER — INPATIENT (INPATIENT)
Facility: HOSPITAL | Age: 53
LOS: 1 days | Discharge: HOME | DRG: 313 | End: 2023-01-18
Attending: INTERNAL MEDICINE | Admitting: INTERNAL MEDICINE
Payer: COMMERCIAL

## 2023-01-16 VITALS
HEART RATE: 64 BPM | WEIGHT: 192.02 LBS | DIASTOLIC BLOOD PRESSURE: 74 MMHG | TEMPERATURE: 98 F | OXYGEN SATURATION: 100 % | HEIGHT: 70 IN | SYSTOLIC BLOOD PRESSURE: 108 MMHG | RESPIRATION RATE: 20 BRPM

## 2023-01-16 DIAGNOSIS — R07.9 CHEST PAIN, UNSPECIFIED: ICD-10-CM

## 2023-01-16 DIAGNOSIS — Z98.890 OTHER SPECIFIED POSTPROCEDURAL STATES: Chronic | ICD-10-CM

## 2023-01-16 DIAGNOSIS — F41.9 ANXIETY DISORDER, UNSPECIFIED: ICD-10-CM

## 2023-01-16 DIAGNOSIS — Z95.0 PRESENCE OF CARDIAC PACEMAKER: ICD-10-CM

## 2023-01-16 DIAGNOSIS — R00.1 BRADYCARDIA, UNSPECIFIED: ICD-10-CM

## 2023-01-16 LAB
ALBUMIN SERPL ELPH-MCNC: 4.3 G/DL — SIGNIFICANT CHANGE UP (ref 3.3–5)
ALP SERPL-CCNC: 90 U/L — SIGNIFICANT CHANGE UP (ref 40–120)
ALT FLD-CCNC: 12 U/L — SIGNIFICANT CHANGE UP (ref 10–45)
ANION GAP SERPL CALC-SCNC: 6 MMOL/L — SIGNIFICANT CHANGE UP (ref 5–17)
AST SERPL-CCNC: 16 U/L — SIGNIFICANT CHANGE UP (ref 10–40)
BASOPHILS # BLD AUTO: 0.06 K/UL — SIGNIFICANT CHANGE UP (ref 0–0.2)
BASOPHILS NFR BLD AUTO: 0.9 % — SIGNIFICANT CHANGE UP (ref 0–2)
BILIRUB SERPL-MCNC: 0.3 MG/DL — SIGNIFICANT CHANGE UP (ref 0.2–1.2)
BUN SERPL-MCNC: 14 MG/DL — SIGNIFICANT CHANGE UP (ref 7–23)
CALCIUM SERPL-MCNC: 9.3 MG/DL — SIGNIFICANT CHANGE UP (ref 8.4–10.5)
CHLORIDE SERPL-SCNC: 105 MMOL/L — SIGNIFICANT CHANGE UP (ref 96–108)
CO2 SERPL-SCNC: 26 MMOL/L — SIGNIFICANT CHANGE UP (ref 22–31)
CREAT SERPL-MCNC: 0.91 MG/DL — SIGNIFICANT CHANGE UP (ref 0.5–1.3)
EGFR: 76 ML/MIN/1.73M2 — SIGNIFICANT CHANGE UP
EOSINOPHIL # BLD AUTO: 0.09 K/UL — SIGNIFICANT CHANGE UP (ref 0–0.5)
EOSINOPHIL NFR BLD AUTO: 1.4 % — SIGNIFICANT CHANGE UP (ref 0–6)
FLUAV AG NPH QL: SIGNIFICANT CHANGE UP
FLUBV AG NPH QL: SIGNIFICANT CHANGE UP
GLUCOSE SERPL-MCNC: 84 MG/DL — SIGNIFICANT CHANGE UP (ref 70–99)
HCG SERPL-ACNC: <2 MIU/ML — SIGNIFICANT CHANGE UP
HCT VFR BLD CALC: 37.2 % — SIGNIFICANT CHANGE UP (ref 34.5–45)
HGB BLD-MCNC: 11.7 G/DL — SIGNIFICANT CHANGE UP (ref 11.5–15.5)
IMM GRANULOCYTES NFR BLD AUTO: 0.3 % — SIGNIFICANT CHANGE UP (ref 0–0.9)
LYMPHOCYTES # BLD AUTO: 1.56 K/UL — SIGNIFICANT CHANGE UP (ref 1–3.3)
LYMPHOCYTES # BLD AUTO: 24.4 % — SIGNIFICANT CHANGE UP (ref 13–44)
MCHC RBC-ENTMCNC: 26.8 PG — LOW (ref 27–34)
MCHC RBC-ENTMCNC: 31.5 GM/DL — LOW (ref 32–36)
MCV RBC AUTO: 85.1 FL — SIGNIFICANT CHANGE UP (ref 80–100)
MONOCYTES # BLD AUTO: 0.81 K/UL — SIGNIFICANT CHANGE UP (ref 0–0.9)
MONOCYTES NFR BLD AUTO: 12.7 % — SIGNIFICANT CHANGE UP (ref 2–14)
NEUTROPHILS # BLD AUTO: 3.86 K/UL — SIGNIFICANT CHANGE UP (ref 1.8–7.4)
NEUTROPHILS NFR BLD AUTO: 60.3 % — SIGNIFICANT CHANGE UP (ref 43–77)
NRBC # BLD: 0 /100 WBCS — SIGNIFICANT CHANGE UP (ref 0–0)
PLATELET # BLD AUTO: 257 K/UL — SIGNIFICANT CHANGE UP (ref 150–400)
POTASSIUM SERPL-MCNC: 5.3 MMOL/L — SIGNIFICANT CHANGE UP (ref 3.5–5.3)
POTASSIUM SERPL-SCNC: 5.3 MMOL/L — SIGNIFICANT CHANGE UP (ref 3.5–5.3)
PROT SERPL-MCNC: 7.4 G/DL — SIGNIFICANT CHANGE UP (ref 6–8.3)
RBC # BLD: 4.37 M/UL — SIGNIFICANT CHANGE UP (ref 3.8–5.2)
RBC # FLD: 14.6 % — HIGH (ref 10.3–14.5)
RSV RNA NPH QL NAA+NON-PROBE: SIGNIFICANT CHANGE UP
SARS-COV-2 RNA SPEC QL NAA+PROBE: SIGNIFICANT CHANGE UP
SODIUM SERPL-SCNC: 137 MMOL/L — SIGNIFICANT CHANGE UP (ref 135–145)
TROPONIN T, HIGH SENSITIVITY RESULT: <6 NG/L — SIGNIFICANT CHANGE UP (ref 0–51)
TROPONIN T, HIGH SENSITIVITY RESULT: <6 NG/L — SIGNIFICANT CHANGE UP (ref 0–51)
TSH SERPL-MCNC: 0.94 UIU/ML — SIGNIFICANT CHANGE UP (ref 0.27–4.2)
WBC # BLD: 6.4 K/UL — SIGNIFICANT CHANGE UP (ref 3.8–10.5)
WBC # FLD AUTO: 6.4 K/UL — SIGNIFICANT CHANGE UP (ref 3.8–10.5)

## 2023-01-16 PROCEDURE — 71275 CT ANGIOGRAPHY CHEST: CPT | Mod: 26,MA

## 2023-01-16 PROCEDURE — 71046 X-RAY EXAM CHEST 2 VIEWS: CPT | Mod: 26

## 2023-01-16 PROCEDURE — 99285 EMERGENCY DEPT VISIT HI MDM: CPT

## 2023-01-16 PROCEDURE — 99222 1ST HOSP IP/OBS MODERATE 55: CPT

## 2023-01-16 RX ORDER — ONDANSETRON 8 MG/1
4 TABLET, FILM COATED ORAL EVERY 8 HOURS
Refills: 0 | Status: DISCONTINUED | OUTPATIENT
Start: 2023-01-16 | End: 2023-01-18

## 2023-01-16 RX ORDER — ACETAMINOPHEN 500 MG
650 TABLET ORAL EVERY 6 HOURS
Refills: 0 | Status: DISCONTINUED | OUTPATIENT
Start: 2023-01-16 | End: 2023-01-18

## 2023-01-16 RX ORDER — LANOLIN ALCOHOL/MO/W.PET/CERES
3 CREAM (GRAM) TOPICAL AT BEDTIME
Refills: 0 | Status: DISCONTINUED | OUTPATIENT
Start: 2023-01-16 | End: 2023-01-18

## 2023-01-16 RX ORDER — ALPRAZOLAM 0.25 MG
0.5 TABLET ORAL
Refills: 0 | Status: DISCONTINUED | OUTPATIENT
Start: 2023-01-16 | End: 2023-01-18

## 2023-01-16 RX ORDER — ESCITALOPRAM OXALATE 10 MG/1
1 TABLET, FILM COATED ORAL
Qty: 0 | Refills: 0 | DISCHARGE

## 2023-01-16 RX ORDER — KETOROLAC TROMETHAMINE 30 MG/ML
15 SYRINGE (ML) INJECTION ONCE
Refills: 0 | Status: DISCONTINUED | OUTPATIENT
Start: 2023-01-16 | End: 2023-01-16

## 2023-01-16 RX ORDER — ALPRAZOLAM 0.25 MG
1 TABLET ORAL
Qty: 0 | Refills: 0 | DISCHARGE

## 2023-01-16 RX ORDER — ENOXAPARIN SODIUM 100 MG/ML
40 INJECTION SUBCUTANEOUS EVERY 24 HOURS
Refills: 0 | Status: DISCONTINUED | OUTPATIENT
Start: 2023-01-16 | End: 2023-01-18

## 2023-01-16 RX ORDER — ESCITALOPRAM OXALATE 10 MG/1
20 TABLET, FILM COATED ORAL DAILY
Refills: 0 | Status: DISCONTINUED | OUTPATIENT
Start: 2023-01-16 | End: 2023-01-18

## 2023-01-16 RX ADMIN — Medication 15 MILLIGRAM(S): at 16:08

## 2023-01-16 NOTE — ED ADULT NURSE REASSESSMENT NOTE - NS ED NURSE REASSESS COMMENT FT1
Report received from RN Joselin. Pt received A&Ox3, IV intact and patent, VSS, pt endorsing intermittent chest pain radiating to back, bed locked and in lowest position, safety and comfort measures maintained, pt on continuous cardiac monitor at this time, BP measurement performed on B/L UE and PATY Lei made aware [place] [phone] and as per PA order no RN interventions required at this time, neuro check performed and intact, pt DIAZ and strong in all extremities w/o difficulty, pupils 3mm equal, round, and reactive to light, pt provided food and drink as per MD admit order, pending bed assignment at this time.

## 2023-01-16 NOTE — H&P ADULT - HISTORY OF PRESENT ILLNESS
53yo F with hx of  atrial tachycardia  SVT s/p ablation, bradycardia, s/p PPM (6/2018 -medtronic), depression on xanax and lexapro, presents to the ED after taking a shower felt tired, and chest discomfort, and tingling on the arm, went to [facility] and there an ambulance was called and patient did not go.     ED :  Patient was hemodynamically stable , CTA of chest was done with no PE, CXR was clear, Trop neg X1  , she received Ketorolax 10 mg IV once and admitted for further work up.   53yo F with hx of  atrial tachycardia  SVT s/p ablation, bradycardia, s/p PPM (6/2018 -medtronic), depression on xanax and lexapro, presents to the ED after taking a shower felt tired, and chest discomfort which she described as a sharp pain as she cannot rate , but feels on the anterior chest and feels on the back as well, unable to rate the pain and pain remains for a long time until she rest.  Pain is exacerbated by exertion and states to feel short on breath once pressure is placed on the left anterior chest .  POs nausea and no vomiting .  Pos tingling sensation of the left arm.  POs fatigue and lightheadeness , no cough , unsure of palpitation .  NO fever , no sick contact , no leg swelling , pos recent drip to [facility] for 6 days with no known exposure .   Patient states that she had something similar in 2021 and was referred to a pulm specialist with no acute found.      ED :  Patient was hemodynamically stable , CTA of chest was done with no PE, CXR was clear, Trop neg X1  , she received Ketorolax 10 mg IV once and admitted for further work up.

## 2023-01-16 NOTE — ED PROVIDER NOTE - PHYSICAL EXAMINATION
Gen: WNWD NAD  HEENT: NCAT  EOMI   Neck: supple  CV: RRR, no murmur, pain on palp of L chest wall along sternal border   Lung: CTA BL  Abd: +BS soft NTND  Ext: wwp, palp pulses, no cce, no calf swelling or TTP   Neuro: Awake alert no FND   SKin warm dry no observable rashes

## 2023-01-16 NOTE — ED PROVIDER NOTE - RAPID ASSESSMENT
Amada Tan MD  52 yr old with hx. of pacemaker 2018, for bradycardia, depression on xanx and lexapro, presents to the ED on Saturday after taking a shower felt tired, and chest discomfort, and tingling on the arm, went to [facility] and there an ambulance was called and pateint did not go.  *** I, Amada Tan MD, performed an initial face to face bedside interview with this patient regarding history of present illness and determined that the patient should be evaluated in the main ED. A physical exam was not performed due to private space availability. This patient's evaluation is NOT COMPLETE and only preliminary. The full assessment, management, and reassessment of this patient, including but not limited to the follow up of ordered laboratory and radiologic testing, is deferred to the main ED provider. *** Amada Tan MD  52 yr old with hx. of pacemaker 2018, for bradycardia, depression on xanx and lexapro, presents to the ED on Saturday after taking a shower felt tired, and chest discomfort, and tingling on the arm, went to [facility] and there an ambulance was called and pateint did not go. Recent trip to [place].  *** I, Amada Tan MD, performed an initial face to face bedside interview with this patient regarding history of present illness and determined that the patient should be evaluated in the main ED. A physical exam was not performed due to private space availability. This patient's evaluation is NOT COMPLETE and only preliminary. The full assessment, management, and reassessment of this patient, including but not limited to the follow up of ordered laboratory and radiologic testing, is deferred to the main ED provider. ***

## 2023-01-16 NOTE — ED PROVIDER NOTE - OBJECTIVE STATEMENT
Reason for Call:  Medication or medication refill:    Do you use a [facility]?  Name of the pharmacy and phone number for the current request:  [facility] - [phone]    Name of the medication requested: Zoloft and levothyroxine     Other request: Patient had an appt for tomorrow 03/19/2018 but she started her menstrual cycle today and rescheduled for next week. She was hoping to get enough of a refill to get her through until she sees the doctor.     Can we leave a detailed message on this number? YES    Phone number patient can be reached at: Cell number on file:    Telephone Information:   Mobile [phone]       Best Time: any    Call taken on 3/15/2018 at 5:04 PM by Kenzie Ponce     52F hx of svt s/p ablation, bradycardia w medtronic ppm here w c/o L sided CP, SOB, intermittent x2-3 days. Recent return from [place]. No hx of VTE. Not tachy or hypoxic. No LE edema or pain. Sx relieved w lying down. Sx reproducible on palp of L chest wall. Triage EKG A paced. No recent URI/infectious sx. Similar presentation in past with neg CTA, normal echo and neg trop x2, told "just inflammation."   Charles Marie

## 2023-01-16 NOTE — H&P ADULT - PROBLEM SELECTOR PLAN 1
unclear etiology of chest pain   Could be related to the PPM lead VS severe sunburn Vs costochondritis  or other etiologies , doubt pericarditis  ACS is less likely as there is no change in EKG as compared to ECG in 2021   Trop X 2   But will monitor in telemetry   TTE Is ordered   Cardio consult in AM   WIll get BP in both arm , I doubt aortic pathology , but will closely monitor and f/up TTE .   Neg  CTA of chest for PE   will f/up inflammatory markers   Neg RVP and Neg  COVID unclear etiology of chest pain   Could be related to the PPM lead VS severe sunburn Vs costochondritis  or other etiologies , doubt pericarditis  ACS is less likely as there is no change in EKG as compared to ECG in 2021   Trop X 2 , Repeat Cardiac enzymes at 10pm  But will monitor in telemetry   TTE Is ordered   Cardio consult in AM   WIll get BP in both arm , I doubt aortic pathology , but will closely monitor and f/up TTE .   Neg  CTA of chest for PE   will f/up inflammatory markers   Neg RVP and Neg  COVID

## 2023-01-16 NOTE — ED ADULT NURSE NOTE - OBJECTIVE STATEMENT
Received pt in assigned area a&ox3, states since Saturday she began having chest heaviness, fatigue & " winded when walking", denies any dizziness, cough, fever or palpitations, pt has a pacemaker, skin intact resps even and non la bored, pt on cardiac monitor, Received pt in assigned area a&ox3, states since Saturday she began having chest heaviness, fatigue & " winded when walking", denies any dizziness, cough, fever or palpitations, pt has a pacemaker, skin intact resps even and non la bored, pt on cardiac monitor, VS are stable, IVL intact, labs drawn, pending dispo , pt in nad, will continue to monitor pt

## 2023-01-16 NOTE — ED ADULT NURSE NOTE - NS ED NURSE LEVEL OF CONSCIOUSNESS SPEECH
Speaking Coherently
(1) other risk factor (includes escalating BMI, pack-years of smoking, diabetes requiring insulin, chemotherapy, female gender and length of surgery)/(1) serious lung disease, including pneumonia (within 1 month)/(1) abnormal pulmonary function (COPD)

## 2023-01-16 NOTE — ED PROVIDER NOTE - PROGRESS NOTE DETAILS
spoke with Dr. Rubio, would like to adm for provocative testing. they will interrogate pacer on inpatient stay - Leah Edwards PA-C

## 2023-01-16 NOTE — H&P ADULT - ASSESSMENT
51yo F with hx of  atrial tachycardia  SVT s/p ablation, bradycardia, s/p PPM (6/2018 -medtronic), depression on xanax and lexapro, presents to the ED after taking a shower felt tired, and chest discomfort, and tingling on the arm, went to [facility] and there an ambulance was called and patient did not go.     ED :  Patient was hemodynamically stable , CTA of chest was done with no PE, CXR was clear, Trop neg X1  , she received Ketorolax 10 mg IV once and admitted for further work up.   53yo F with hx of  atrial tachycardia  SVT s/p ablation, bradycardia, s/p PPM (6/2018 -medtronic), depression on xanax and lexapro, presents to the ED after taking a shower felt tired, and chest discomfort which she described as a sharp pain as she cannot rate , but feels on the anterior chest and feels on the back as well, unable to rate the pain and pain remains for a long time until she rest.  Pain is exacerbated by exertion and states to feel short on breath once pressure is placed on the left anterior chest .  POs nausea and no vomiting .  Pos tingling sensation of the left arm.  POs fatigue and lightheadeness , no cough , unsure of palpitation .  NO fever , no sick contact , no leg swelling , pos recent drip to [employer] for 6 days with no known exposure .   Patient states that she had something similar in 2021 and was referred to a pulm specialist with no acute found.    ED :  Patient was hemodynamically stable , CTA of chest was done with no PE, CXR was clear, Trop neg X1  , she received Ketorolax 10 mg IV once and admitted for further work up.

## 2023-01-16 NOTE — ED PROVIDER NOTE - CLINICAL SUMMARY MEDICAL DECISION MAKING FREE TEXT BOX
52F hx of svt s/p ablation, bradycardia w medtronic ppm here w c/o L sided CP, SOB, intermittent x2-3 days. Recent return from [place]. No hx of VTE. Not tachy or hypoxic. No LE edema or pain. Sx relieved w lying down. Sx reproducible on palp of L chest wall. EKG A paced. No recent URI/infectious sx. Similar presentation with neg CTA, normal echo and neg trop x2, told "just inflammation." GIven recent travel and almost pleuritic nature, PE is possible. Has never had stress testing however initial trop here neg and does not sound classic for ACS. Likley low enough risk for ACS, w/u could be done as an OP. Pericarditis is possible, though pain is relieved w lying supine, no EKG finidngs suggestive for pericarditis though is paced. FU labs, imaging, can attempt to get data from PPM during sx episodes. Likely DC W OP cards FU. 52F hx of svt s/p ablation, bradycardia w medtronic ppm here w c/o L sided CP, SOB, intermittent x2-3 days. Recent return from [place]. No hx of VTE. Not tachy or hypoxic. No LE edema or pain. Sx relieved w lying down. Sx reproducible on palp of L chest wall. EKG A paced. No recent URI/infectious sx. Similar presentation with neg CTA, normal echo and neg trop x2, told "just inflammation." GIven recent travel and almost pleuritic nature, PE is possible. Has never had stress testing however initial trop here neg and does not sound classic for ACS. Likley low enough risk for ACS, w/u could be done as an OP. Pericarditis is possible, though pain is relieved w lying supine, no EKG findings suggestive for pericarditis though is paced. FU labs, imaging, can attempt to get data from PPM during sx episodes. Likely DC W OP cards FU.

## 2023-01-16 NOTE — H&P ADULT - NSHPADDITIONALINFOADULT_GEN_ALL_CORE
Maribell Camargo   Hospitalist   [phone] / Available via TEAMS Lovenox for DVT prophylaxis       Maribell Camargo   Hospitalist   [phone] / Available via TEAMS Lovenox for DVT prophylaxis   Signed out to ACP , Noelle Camargo   Hospitalist   [phone] / Available via TEAMS

## 2023-01-16 NOTE — ED ADULT NURSE REASSESSMENT NOTE - NS ED NURSE REASSESS COMMENT FT1
medicated per MD orders, awaiting results, no new complaints, resting aqt this time, cardiac monitor remains on pt,

## 2023-01-17 ENCOUNTER — TRANSCRIPTION ENCOUNTER (OUTPATIENT)
Age: 53
End: 2023-01-17

## 2023-01-17 LAB
ALBUMIN SERPL ELPH-MCNC: 3.5 G/DL — SIGNIFICANT CHANGE UP (ref 3.3–5)
ALP SERPL-CCNC: 80 U/L — SIGNIFICANT CHANGE UP (ref 40–120)
ALT FLD-CCNC: 12 U/L — SIGNIFICANT CHANGE UP (ref 10–45)
ANION GAP SERPL CALC-SCNC: 11 MMOL/L — SIGNIFICANT CHANGE UP (ref 5–17)
ANION GAP SERPL CALC-SCNC: 12 MMOL/L — SIGNIFICANT CHANGE UP (ref 5–17)
AST SERPL-CCNC: 14 U/L — SIGNIFICANT CHANGE UP (ref 10–40)
BASOPHILS # BLD AUTO: 0.06 K/UL — SIGNIFICANT CHANGE UP (ref 0–0.2)
BASOPHILS NFR BLD AUTO: 1 % — SIGNIFICANT CHANGE UP (ref 0–2)
BILIRUB SERPL-MCNC: 0.4 MG/DL — SIGNIFICANT CHANGE UP (ref 0.2–1.2)
BUN SERPL-MCNC: 15 MG/DL — SIGNIFICANT CHANGE UP (ref 7–23)
BUN SERPL-MCNC: 16 MG/DL — SIGNIFICANT CHANGE UP (ref 7–23)
CALCIUM SERPL-MCNC: 8.6 MG/DL — SIGNIFICANT CHANGE UP (ref 8.4–10.5)
CALCIUM SERPL-MCNC: 9 MG/DL — SIGNIFICANT CHANGE UP (ref 8.4–10.5)
CHLORIDE SERPL-SCNC: 106 MMOL/L — SIGNIFICANT CHANGE UP (ref 96–108)
CHLORIDE SERPL-SCNC: 107 MMOL/L — SIGNIFICANT CHANGE UP (ref 96–108)
CHOLEST SERPL-MCNC: 186 MG/DL — SIGNIFICANT CHANGE UP
CK MB BLD-MCNC: 1.5 % — SIGNIFICANT CHANGE UP (ref 0–3.5)
CK MB CFR SERPL CALC: 1 NG/ML — SIGNIFICANT CHANGE UP (ref 0–3.8)
CK SERPL-CCNC: 66 U/L — SIGNIFICANT CHANGE UP (ref 25–170)
CO2 SERPL-SCNC: 20 MMOL/L — LOW (ref 22–31)
CO2 SERPL-SCNC: 21 MMOL/L — LOW (ref 22–31)
CREAT SERPL-MCNC: 0.88 MG/DL — SIGNIFICANT CHANGE UP (ref 0.5–1.3)
CREAT SERPL-MCNC: 0.96 MG/DL — SIGNIFICANT CHANGE UP (ref 0.5–1.3)
EGFR: 72 ML/MIN/1.73M2 — SIGNIFICANT CHANGE UP
EGFR: 80 ML/MIN/1.73M2 — SIGNIFICANT CHANGE UP
EOSINOPHIL # BLD AUTO: 0.13 K/UL — SIGNIFICANT CHANGE UP (ref 0–0.5)
EOSINOPHIL NFR BLD AUTO: 2.2 % — SIGNIFICANT CHANGE UP (ref 0–6)
GLUCOSE SERPL-MCNC: 149 MG/DL — HIGH (ref 70–99)
GLUCOSE SERPL-MCNC: 90 MG/DL — SIGNIFICANT CHANGE UP (ref 70–99)
HCT VFR BLD CALC: 33.8 % — LOW (ref 34.5–45)
HCT VFR BLD CALC: 37 % — SIGNIFICANT CHANGE UP (ref 34.5–45)
HDLC SERPL-MCNC: 55 MG/DL — SIGNIFICANT CHANGE UP
HGB BLD-MCNC: 10.6 G/DL — LOW (ref 11.5–15.5)
HGB BLD-MCNC: 11.4 G/DL — LOW (ref 11.5–15.5)
IMM GRANULOCYTES NFR BLD AUTO: 0.2 % — SIGNIFICANT CHANGE UP (ref 0–0.9)
INR BLD: 1.18 RATIO — HIGH (ref 0.88–1.16)
LIPID PNL WITH DIRECT LDL SERPL: 117 MG/DL — HIGH
LYMPHOCYTES # BLD AUTO: 1.54 K/UL — SIGNIFICANT CHANGE UP (ref 1–3.3)
LYMPHOCYTES # BLD AUTO: 26.6 % — SIGNIFICANT CHANGE UP (ref 13–44)
MCHC RBC-ENTMCNC: 26.8 PG — LOW (ref 27–34)
MCHC RBC-ENTMCNC: 26.8 PG — LOW (ref 27–34)
MCHC RBC-ENTMCNC: 30.8 GM/DL — LOW (ref 32–36)
MCHC RBC-ENTMCNC: 31.4 GM/DL — LOW (ref 32–36)
MCV RBC AUTO: 85.4 FL — SIGNIFICANT CHANGE UP (ref 80–100)
MCV RBC AUTO: 86.9 FL — SIGNIFICANT CHANGE UP (ref 80–100)
MONOCYTES # BLD AUTO: 0.6 K/UL — SIGNIFICANT CHANGE UP (ref 0–0.9)
MONOCYTES NFR BLD AUTO: 10.4 % — SIGNIFICANT CHANGE UP (ref 2–14)
NEUTROPHILS # BLD AUTO: 3.45 K/UL — SIGNIFICANT CHANGE UP (ref 1.8–7.4)
NEUTROPHILS NFR BLD AUTO: 59.6 % — SIGNIFICANT CHANGE UP (ref 43–77)
NON HDL CHOLESTEROL: 131 MG/DL — HIGH
NRBC # BLD: 0 /100 WBCS — SIGNIFICANT CHANGE UP (ref 0–0)
NRBC # BLD: 0 /100 WBCS — SIGNIFICANT CHANGE UP (ref 0–0)
PLATELET # BLD AUTO: 224 K/UL — SIGNIFICANT CHANGE UP (ref 150–400)
PLATELET # BLD AUTO: 226 K/UL — SIGNIFICANT CHANGE UP (ref 150–400)
POTASSIUM SERPL-MCNC: 3.7 MMOL/L — SIGNIFICANT CHANGE UP (ref 3.5–5.3)
POTASSIUM SERPL-MCNC: 4.2 MMOL/L — SIGNIFICANT CHANGE UP (ref 3.5–5.3)
POTASSIUM SERPL-SCNC: 3.7 MMOL/L — SIGNIFICANT CHANGE UP (ref 3.5–5.3)
POTASSIUM SERPL-SCNC: 4.2 MMOL/L — SIGNIFICANT CHANGE UP (ref 3.5–5.3)
PROT SERPL-MCNC: 6.4 G/DL — SIGNIFICANT CHANGE UP (ref 6–8.3)
PROTHROM AB SERPL-ACNC: 13.6 SEC — HIGH (ref 10.5–13.4)
RBC # BLD: 3.96 M/UL — SIGNIFICANT CHANGE UP (ref 3.8–5.2)
RBC # BLD: 4.26 M/UL — SIGNIFICANT CHANGE UP (ref 3.8–5.2)
RBC # FLD: 14.6 % — HIGH (ref 10.3–14.5)
RBC # FLD: 14.6 % — HIGH (ref 10.3–14.5)
SODIUM SERPL-SCNC: 138 MMOL/L — SIGNIFICANT CHANGE UP (ref 135–145)
SODIUM SERPL-SCNC: 139 MMOL/L — SIGNIFICANT CHANGE UP (ref 135–145)
TRIGL SERPL-MCNC: 67 MG/DL — SIGNIFICANT CHANGE UP
TROPONIN T, HIGH SENSITIVITY RESULT: <6 NG/L — SIGNIFICANT CHANGE UP (ref 0–51)
TSH SERPL-MCNC: 1.37 UIU/ML — SIGNIFICANT CHANGE UP (ref 0.27–4.2)
WBC # BLD: 5.79 K/UL — SIGNIFICANT CHANGE UP (ref 3.8–10.5)
WBC # BLD: 6.7 K/UL — SIGNIFICANT CHANGE UP (ref 3.8–10.5)
WBC # FLD AUTO: 5.79 K/UL — SIGNIFICANT CHANGE UP (ref 3.8–10.5)
WBC # FLD AUTO: 6.7 K/UL — SIGNIFICANT CHANGE UP (ref 3.8–10.5)

## 2023-01-17 RX ORDER — MECLIZINE HCL 12.5 MG
12.5 TABLET ORAL ONCE
Refills: 0 | Status: DISCONTINUED | OUTPATIENT
Start: 2023-01-17 | End: 2023-01-18

## 2023-01-17 RX ADMIN — ENOXAPARIN SODIUM 40 MILLIGRAM(S): 100 INJECTION SUBCUTANEOUS at 10:22

## 2023-01-17 RX ADMIN — Medication 650 MILLIGRAM(S): at 18:09

## 2023-01-17 RX ADMIN — Medication 0.5 MILLIGRAM(S): at 18:08

## 2023-01-17 RX ADMIN — ESCITALOPRAM OXALATE 20 MILLIGRAM(S): 10 TABLET, FILM COATED ORAL at 19:45

## 2023-01-17 NOTE — DISCHARGE NOTE PROVIDER - NSDCMRMEDTOKEN_GEN_ALL_CORE_FT
escitalopram 20 mg oral tablet: 1 tab(s) orally once a day  Xanax 0.5 mg oral tablet: 1 tab(s) orally 2 times a day, As Needed   escitalopram 20 mg oral tablet: 1 tab(s) orally once a day  Mrs Huseyin Del Real was admitted at [facility] from 1/16/23 to 1/18/23 for a medical illness. Patient is cleared to return to work without restrictions 1/21/23. Respectfully, Mirta Major, SEEMA. :   Xanax 0.5 mg oral tablet: 1 tab(s) orally 2 times a day, As Needed

## 2023-01-17 NOTE — DISCHARGE NOTE PROVIDER - NSDCCPCAREPLAN_GEN_ALL_CORE_FT
PRINCIPAL DISCHARGE DIAGNOSIS  Diagnosis: Chest pain  Assessment and Plan of Treatment: Your TTE revealed ________  Interrogation of your PPM demonstrated ____________  Call your doctor if you have any new pain, pressure, or discomfort in the center of your chest, pain, tingling or discomfort in arms, back, neck, jaw, or stomach, shortness of breath, nausea, vomiting, burping or heartburn, sweating, cold and clammy skin, racing or abnormal heartbeat for more than 10 minutes or if they keep coming & going.  Call 911 and do not try to get to hospital by car.        SECONDARY DISCHARGE DIAGNOSES  Diagnosis: Anxiety  Assessment and Plan of Treatment: Continue medication as prescribed  Follow-up with your primary care physician within 1 week. Call for appointment.  Please bring all discharge paperwork and list of medications to all follow up appointments  Please call for follow up appointments one day after discharge       PRINCIPAL DISCHARGE DIAGNOSIS  Diagnosis: Chest pain  Assessment and Plan of Treatment:   HOME CARE INSTRUCTIONS  For the next few days, avoid physical activities that bring on chest pain. Continue physical activities as directed.  Do not Informed pt of the various negative side effects of smoking including risk of COPD, Lung Ca etc  Strongly recommended that pt stops smoking and pt given various options of smoking cessasion tools such as NRT's and other pharmacotherapies.  Avoid drinking alcohol.   Only take over-the-counter or prescription medicine for pain, discomfort, or fever as directed by your caregiver.  Follow your caregiver's suggestions for further testing if your chest pain does not go away.  Keep any follow-up appointments you made. If you do not go to an appointment, you could develop lasting (chronic) problems with pain. If there is any problem keeping an appointment, you must call to reschedule.   SEEK MEDICAL CARE IF:  You think you are having problems from the medicine you are taking. Read your medicine instructions carefully.  Your chest pain does not go away, even after treatment.  You develop a rash with blisters on your chest.  SEEK IMMEDIATE MEDICAL CARE IF:  You have increased chest pain or pain that spreads to your arm, neck, jaw, back, or abdomen.   You develop shortness of breath, an increasing cough, or you are coughing up blood.  You have severe back or abdominal pain, feel nauseous, or vomit.  You develop severe weakness, fainting, or chills.  You have a fever.  THIS IS AN EMERGENCY. Do not wait to see if the pain will go away. Get medical help at once. Call your local emergency servicesDo not drive yourself to the hospital.   Call your doctor if you have any new pain, pressure, or discomfort in the center of your chest, pain, tingling or discomfort in arms, back, neck, jaw, or stomach, shortness of breath, nausea, vomiting, burping or heartburn, sweating, cold and clammy skin, racing or abnormal heartbeat. Call 911 and do not try to get to hospital by car.   Your TTE revealed EF 65%.   Interrogation of your PPM demonstrated normal function        SECONDARY DISCHARGE DIAGNOSES  Diagnosis: Anxiety  Assessment and Plan of Treatment: Continue medication as prescribed  Follow-up with your primary care physician within 1 week. Call for appointment.  Please bring all discharge paperwork and list of medications to all follow up appointments  Please call for follow up appointments one day after discharge

## 2023-01-17 NOTE — DISCHARGE NOTE PROVIDER - NSDCFUSCHEDAPPT_GEN_ALL_CORE_FT
Health system Physician [facility]  ELECTROPH [address]  Scheduled Appointment: 02/02/2023

## 2023-01-17 NOTE — DISCHARGE NOTE PROVIDER - HOSPITAL COURSE
53yo F with hx of  atrial tachycardia  SVT s/p ablation, bradycardia, s/p PPM (6/2018 -medtronic), depression on xanax and lexapro, presents to the ED after taking a shower felt tired, and chest discomfort. In ED, CTA of chest was done with no PE, CXR was clear, Trop neg X3  , she received Ketorolax 10 mg IV once and admitted for further work up.  EKG paced with no acute ischemia, CT chest negative for PE, Chest xray clear. Cardiology was consulted recommending - interrogate PPM to r/o recurrent SVT and check TTE.    Interrogation revealed ____________. TTE demonstrated ______________.    Discharge/Dispo/Med rec discussed with attending _______. Patient medically cleared for discharge with outpatient follow up with PCP and cardiologist Dr. Orta         51yo F with hx of  atrial tachycardia  SVT s/p ablation, bradycardia, s/p PPM (6/2018 -medtronic), depression on xanax and lexapro, presents to the ED after taking a shower felt tired, and chest discomfort. In ED, CTA of chest was done with no PE, CXR was clear, Trop neg X3  , she received Ketorolax 10 mg IV once and admitted for further work up.  EKG paced with no acute ischemia, CT chest negative for PE, Chest xray clear. Cardiology was consulted recommending - interrogate PPM to r/o recurrent SVT and check TTE.  Interrogation revealed atrial pacing 60s, normal pacemaker function.  Echo revealed EF 65%, Normal left ventricular internal dimensions and wall  thicknesses. Normal left ventricular systolic function. No segmental wall motion abnormalities.  Normal right ventricular size and systolic function.  There is a pacing wire directed toward the interventricular septum that appears to cross the septum into the left ventricle.    Discharge/Dispo/Med rec discussed with attending Dr. Marie. Patient medically cleared for discharge with outpatient follow up with PCP and cardiologist Dr. Orta.

## 2023-01-17 NOTE — ED ADULT NURSE REASSESSMENT NOTE - NS ED NURSE REASSESS COMMENT FT1
Writer returned from lab and heading towards patient to converse with her and noted a multiple staff members at bedside. Writer informed that patient fell whil in bathroom. Patient unable torecall exact events but states she felt lightheaded and dizzy prior to getting up to bathroom. NP Cooper at bedside and states will order EKG and additional lab work.

## 2023-01-17 NOTE — DISCHARGE NOTE PROVIDER - NSDCFUADDAPPT_GEN_ALL_CORE_FT
APPTS ARE READY TO BE MADE: [X] YES    Best Family or Patient Contact (if needed):    Additional Information about above appointments (if needed):    1:   2:   3:     Other comments or requests:    Follow up with Cardiologist within 3 days of discharge  APPTS ARE READY TO BE MADE: [X] YES    Best Family or Patient Contact (if needed):    Additional Information about above appointments (if needed):    1:   2:   3:     Other comments or requests:

## 2023-01-17 NOTE — ED ADULT NURSE REASSESSMENT NOTE - NS ED NURSE REASSESS COMMENT FT1
Writer received report at 0700 and assumed care. Patient denies numbness, tingling, headache, or dizziness. Denies chest pain at present time. Chief complaint yesterday was left-sided chest pain.  States prior reported chest pain was aggravated by movement, radiated to back, and reproducible on palpitation. Shortness of breath reported with talking. Respirations appear even and unlabored. Denies n/v/d.

## 2023-01-17 NOTE — DISCHARGE NOTE PROVIDER - CARE PROVIDER_API CALL
Colin Orta (MD)  Cardiovascular Disease  [address], [address]  [place] [address]  Phone: [phone]  Fax: [phone]  Follow Up Time: 1-3 days

## 2023-01-17 NOTE — ED ADULT NURSE REASSESSMENT NOTE - NS ED NURSE REASSESS COMMENT FT1
[id number] Break coverage for Shira RN [id number] Spoke to ANA Major about pt falling EKG done and urine ordered NO other orderers at this time Will continue to monitor [email or web address]

## 2023-01-18 ENCOUNTER — TRANSCRIPTION ENCOUNTER (OUTPATIENT)
Age: 53
End: 2023-01-18

## 2023-01-18 VITALS
SYSTOLIC BLOOD PRESSURE: 106 MMHG | HEART RATE: 84 BPM | DIASTOLIC BLOOD PRESSURE: 74 MMHG | TEMPERATURE: 98 F | OXYGEN SATURATION: 98 % | RESPIRATION RATE: 18 BRPM

## 2023-01-18 PROCEDURE — 86140 C-REACTIVE PROTEIN: CPT

## 2023-01-18 PROCEDURE — 82550 ASSAY OF CK (CPK): CPT

## 2023-01-18 PROCEDURE — 85027 COMPLETE CBC AUTOMATED: CPT

## 2023-01-18 PROCEDURE — 96374 THER/PROPH/DIAG INJ IV PUSH: CPT

## 2023-01-18 PROCEDURE — 99285 EMERGENCY DEPT VISIT HI MDM: CPT | Mod: 25

## 2023-01-18 PROCEDURE — 71046 X-RAY EXAM CHEST 2 VIEWS: CPT

## 2023-01-18 PROCEDURE — 71275 CT ANGIOGRAPHY CHEST: CPT | Mod: MA

## 2023-01-18 PROCEDURE — 84702 CHORIONIC GONADOTROPIN TEST: CPT

## 2023-01-18 PROCEDURE — 85025 COMPLETE CBC W/AUTO DIFF WBC: CPT

## 2023-01-18 PROCEDURE — 85652 RBC SED RATE AUTOMATED: CPT

## 2023-01-18 PROCEDURE — 87637 SARSCOV2&INF A&B&RSV AMP PRB: CPT

## 2023-01-18 PROCEDURE — 80061 LIPID PANEL: CPT

## 2023-01-18 PROCEDURE — 80048 BASIC METABOLIC PNL TOTAL CA: CPT

## 2023-01-18 PROCEDURE — 84484 ASSAY OF TROPONIN QUANT: CPT

## 2023-01-18 PROCEDURE — 93280 PM DEVICE PROGR EVAL DUAL: CPT | Mod: 26

## 2023-01-18 PROCEDURE — 85610 PROTHROMBIN TIME: CPT

## 2023-01-18 PROCEDURE — 93306 TTE W/DOPPLER COMPLETE: CPT | Mod: 26

## 2023-01-18 PROCEDURE — 93306 TTE W/DOPPLER COMPLETE: CPT

## 2023-01-18 PROCEDURE — 82553 CREATINE MB FRACTION: CPT

## 2023-01-18 PROCEDURE — 84443 ASSAY THYROID STIM HORMONE: CPT

## 2023-01-18 PROCEDURE — 36415 COLL VENOUS BLD VENIPUNCTURE: CPT

## 2023-01-18 PROCEDURE — 80053 COMPREHEN METABOLIC PANEL: CPT

## 2023-01-18 RX ADMIN — ENOXAPARIN SODIUM 40 MILLIGRAM(S): 100 INJECTION SUBCUTANEOUS at 11:47

## 2023-01-18 RX ADMIN — ESCITALOPRAM OXALATE 20 MILLIGRAM(S): 10 TABLET, FILM COATED ORAL at 11:47

## 2023-01-18 NOTE — CONSULT NOTE ADULT - SUBJECTIVE AND OBJECTIVE BOX
CARDIOLOGY CONSULT23      HPI:  53yo F with hx of  atrial tachycardia  SVT s/p ablation, bradycardia, s/p PPM (2018 -medtronic), depression on xanax and lexapro, presents to the ED after taking a shower felt tired, and chest discomfort which she described as a sharp pain as she cannot rate , but feels on the anterior chest and feels on the back as well, unable to rate the pain and pain remains for a long time until she rest.  Pain is exacerbated by exertion and states to feel short on breath once pressure is placed on the left anterior chest .  POs nausea and no vomiting .  Pos tingling sensation of the left arm.  POs fatigue and lightheadeness , no cough , unsure of palpitation .  NO fever , no sick contact , no leg swelling , pos recent drip to [email or web address] for 6 days with no known exposure .   Patient states that she had something similar in  and was referred to a pulm specialist with no acute found.      ED :  Patient was hemodynamically stable , CTA of chest was done with no PE, CXR was clear, Trop neg X1  , she received Ketorolax 10 mg IV once and admitted for further work up.   (2023 18:28)      PAST MEDICAL & SURGICAL HISTORY:  SVT (Supraventricular Tachycardia)      Migraine  with menstrual cycle      Bradycardia      Anxiety       delivery delivered  16 years ago      History of loop recorder              PREVIOUS DIAGNOSTIC TESTING:    [ ] Echocardiogram:  [ ]  Catheterization:  [ ] Stress Test:  	    MEDICATIONS:  MEDICATIONS  (STANDING):  enoxaparin Injectable 40 milliGRAM(s) SubCutaneous every 24 hours  escitalopram 20 milliGRAM(s) Oral daily      FAMILY HISTORY:  No pertinent family history in first degree relatives        SOCIAL HISTORY:    [ ] Non-smoker  [ ] Smoker  [ ] Alcohol    Allergies    bacitracin (Rash)    Intolerances    	    REVIEW OF SYSTEMS:  CONSTITUTIONAL: No fever, weight loss, or fatigue  EYES: No eye pain, visual disturbances, or discharge  ENMT:  No difficulty hearing, tinnitus, vertigo; No sinus or throat pain  NECK: No pain or stiffness  RESPIRATORY: No cough, wheezing, chills or hemoptysis; No Shortness of Breath  CARDIOVASCULAR: No chest pain, palpitations, passing out, dizziness, or leg swelling  GASTROINTESTINAL: No abdominal or epigastric pain. No nausea, vomiting, or hematemesis; No diarrhea or constipation. No melena or hematochezia.  GENITOURINARY: No dysuria, frequency, hematuria, or incontinence  NEUROLOGICAL: No headaches, memory loss, loss of strength, numbness, or tremors  SKIN: No itching, burning, rashes, or lesions   	    [ ] All others negative	  [ ] Unable to obtain    PHYSICAL EXAM:  T(C): 36.8 (23 @ 10:30), Max: 36.9 (23 @ 20:13)  HR: 64 (23 @ 10:30) (62 - 77)  BP: 121/79 (23 @ 10:30) (96/59 - 122/80)  RR: 16 (23 @ 10:30) (16 - 19)  SpO2: 97% (23 @ 05:05) (97% - 100%)  Wt(kg): --  I&O's Summary      Appearance: Normal	  Psychiatry: A & O x 3, Mood & affect appropriate  HEENT:   Normal oral mucosa, PERRL, EOMI	  Lymphatic: No lymphadenopathy  Cardiovascular: Normal S1 S2,RRR, No JVD, No murmurs  Respiratory: Lungs clear to auscultation	  Gastrointestinal:  Soft, Non-tender, + BS	  Skin: No rashes, No ecchymoses, No cyanosis	  Neurologic: Non-focal  Extremities: Normal range of motion, No clubbing, cyanosis or edema  Vascular: Peripheral pulses palpable 2+ bilaterally    TELEMETRY: 	    ECG:  	  RADIOLOGY:  OTHER: 	  	  LABS:	 	    CARDIAC MARKERS:                                  10.6   5.79  )-----------( 224      ( 2023 05:16 )             33.8         138  |  107  |  15  ----------------------------<  90  4.2   |  20<L>  |  0.96    Ca    8.6      2023 05:16    TPro  6.4  /  Alb  3.5  /  TBili  0.4  /  DBili  x   /  AST  14  /  ALT  12  /  AlkPhos  80      PT/INR - ( 2023 05:16 )   PT: 13.6 sec;   INR: 1.18 ratio           proBNP:   Lipid Profile:   HgA1c:   TSH: Thyroid Stimulating Hormone, Serum: 1.37 uIU/mL ( @ 05:16)  Thyroid Stimulating Hormone, Serum: 0.94 uIU/mL ( @ 14:05)      ASSESSMENT/PLAN: 	              70 minutes spent on total encounter; more than 50% of the visit was spent counseling and/or coordinating care by the attending physician.  
HISTORY OF PRESENT ILLNESS: 51 y/o woman with history of atrial tachycardia from magalis terminalis s/p EPS/ablation (dual AV nodes, but no AVNRT 10/2012), bradycardia s/p ILR and ultimately follow up EP study and implantation of a Medtronic dual chamber PPM (2018) for SND, depression on Xanax and Lexapro, who presented to the ED 23 after taking a shower felt tired, and chest discomfort which she described as a sharp pain that radiated to the back as well. Pain is exacerbated by exertion and states to feel short on breath once pressure is placed on the left anterior chest . She had a recent drip to [place] for 6 days with no known exposure. RRT yesterday for dizziness in bathroom, but no events on telemetry. She reports occasional dizziness and palpitations, but denies any presyncope or syncope.    Allergies  bacitracin (Rash)    Intolerances    	    MEDICATIONS:  enoxaparin Injectable 40 milliGRAM(s) SubCutaneous every 24 hours  acetaminophen     Tablet .. 650 milliGRAM(s) Oral every 6 hours PRN  ALPRAZolam 0.5 milliGRAM(s) Oral two times a day PRN  escitalopram 20 milliGRAM(s) Oral daily  meclizine 12.5 milliGRAM(s) Oral once  melatonin 3 milliGRAM(s) Oral at bedtime PRN  ondansetron Injectable 4 milliGRAM(s) IV Push every 8 hours PRN  aluminum hydroxide/magnesium hydroxide/simethicone Suspension 30 milliLiter(s) Oral every 4 hours PRN          PAST MEDICAL & SURGICAL HISTORY:  SVT (Supraventricular Tachycardia)      Migraine  with menstrual cycle      Bradycardia      Anxiety       delivery delivered  16 years ago      History of loop recorder          FAMILY HISTORY:  No pertinent family history in first degree relatives  Denies SCD      SOCIAL HISTORY:    Never smoker  Rare ETOH use      REVIEW OF SYSTEMS:  See HPI. Otherwise, 12 point ROS done and otherwise negative.    PHYSICAL EXAM:  T(C): 37.1 (23 @ 09:56), Max: 37.2 (23 @ 16:00)  HR: 66 (23 @ 09:56) (66 - 78)  BP: 109/74 (23 @ 09:56) (107/70 - 155/89)  RR: 18 (23 @ 09:56) (18 - 21)  SpO2: 99% (23 @ 05:24) (99% - 100%)  Wt(kg): --  I&O's Summary      Appearance: Normal	  HEENT:  PERRL, EOMI	  Cardiovascular: Normal S1 S2, No JVD, No murmurs, No edema  Respiratory: Lungs clear to auscultation	  Psychiatry: A & O x 3, Mood & affect appropriate  Gastrointestinal:  Soft, Non-tender, + BS	  Skin: No rashes, No ecchymoses, No cyanosis	  Neurologic: Non-focal  Extremities: No clubbing, cyanosis or edema  Vascular: Peripheral pulses palpable 2+ bilaterally        LABS:	 	    CBC Full  -  ( 2023 20:00 )  WBC Count : 6.70 K/uL  Hemoglobin : 11.4 g/dL  Hematocrit : 37.0 %  Platelet Count - Automated : 226 K/uL  Mean Cell Volume : 86.9 fl  Mean Cell Hemoglobin : 26.8 pg  Mean Cell Hemoglobin Concentration : 30.8 gm/dL  Auto Neutrophil # : x  Auto Lymphocyte # : x  Auto Monocyte # : x  Auto Eosinophil # : x  Auto Basophil # : x  Auto Neutrophil % : x  Auto Lymphocyte % : x  Auto Monocyte % : x  Auto Eosinophil % : x  Auto Basophil % : x        139  |  106  |  16  ----------------------------<  149<H>  3.7   |  21<L>  |  0.88      138  |  107  |  15  ----------------------------<  90  4.2   |  20<L>  |  0.96    Ca    9.0      2023 20:00  Ca    8.6      2023 05:16    TPro  6.4  /  Alb  3.5  /  TBili  0.4  /  DBili  x   /  AST  14  /  ALT  12  /  AlkPhos  80    TPro  7.4  /  Alb  4.3  /  TBili  0.3  /  DBili  x   /  AST  16  /  ALT  12  /  AlkPhos  90      Thyroid Stimulating Hormone, Serum (23 @ 05:16)    Thyroid Stimulating Hormone, Serum: 1.37 uIU/mL      Creatine Kinase, Serum: 64 U/L (23 @ 20:00)      TELEMETRY: Atrial pacing 60-70s    < from: Transthoracic Echocardiogram (23 @ 09:27) >  PROCEDURE: Transthoracic echocardiogram with 2-D, M-Mode  and complete spectral and color flow Doppler.  INDICATION: Chest Pain (R07.9)  ------------------------------------------------------------------------  Dimensions:    Normal Values:  LA:     2.7    2.0 - 4.0 cm  Ao:     2.2    2.0 - 3.8 cm  SEPTUM: 1.1    0.6 - 1.2 cm  PWT:    1.1    0.6 - 1.1 cm  LVIDd:  3.5    3.0 - 5.6 cm  LVIDs:  2.3    1.8 - 4.0 cm  Derived variables:  LVMI: 58 g/m2  RWT: 0.62  Fractional short: 34 %  EF (Visual Estimate): 65 %  Doppler Peak Velocity (m/sec): AoV=1.0  ------------------------------------------------------------------------  Observations:  Mitral Valve: Normal mitral valve.  Aortic Valve/Aorta: Normal trileaflet aortic valve. Peak  transaortic valve gradient equals 4 mm Hg. Peak left  ventricular outflow tract gradient equals 3 mm Hg.  Aortic Root: 2.2 cm.  LVOT diameter: 2.8 cm.  Left Atrium: Normal left atrium.  LA volume index = 28  cc/m2.  Left Ventricle: Normal left ventricular systolic function.  No segmental wall motion abnormalities. Normal left  ventricular internal dimensions and wall thicknesses.  Normal diastolic function.  Right Heart: Normal right atrium. Normal right ventricular  size and systolic function. Normal tricuspid valve. Normal  pulmonic valve.  Pericardium/Pleura: Normal pericardium with no pericardial  effusion.  Hemodynamic: Estimated right atrial pressure is 8 mm Hg.  Estimated right ventricular systolic pressure equals 29 mm  Hg, assuming right atrial pressure equals 8 mm Hg,  consistent with normal pulmonary pressures.  ------------------------------------------------------------------------  Conclusions:  1. Normal left ventricular internal dimensions and wall  thicknesses.  2. Normal left ventricular systolic function. No segmental  wall motion abnormalities.  3. Normal right ventricular size and systolic function.  There is a pacing wire directed toward the interventricular  septum that appears to cross the septum into the left  ventricle.  EP team notified.  Images reviewed with Dr. Coleman.  Discussed with Dr. Marie    < end of copied text >    < from: CT Angio Chest PE Protocol w/ IV Cont (23 @ 17:06) >  INTERPRETATION:  Reason for Exam: Shortness of breath. chest pain.    CTA of the chest was performed from the thoracic inlet to the level of   the adrenal glands following IV contrast injection of  80 cc of Omnipaque   350. No immediate complications were reported.  MIP images were also   created and reviewed.    Comparison: August 10, 2021    Tubes/Lines: Dual-chamber cardiac pacer noted.    Mediastinum and Heart: Aorta and pulmonary arteries are normal in size.   Thyroid gland is unremarkable. No lymphadenopathy. No pericardial   effusion.    Lungs, Pleura, and Airways: There is no pulmonary embolus. No   consolidations, edema,effusion, or pneumothorax.    Visualized Abdomen: Unremarkable.    Bones and soft tissues: Unremarkable.    IMPRESSION:    No pulmonary embolus.    < end of copied text >

## 2023-01-18 NOTE — DISCHARGE NOTE NURSING/CASE MANAGEMENT/SOCIAL WORK - NSDCFUADDAPPT_GEN_ALL_CORE_FT
Follow up with Cardiologist within 3 days of discharge  APPTS ARE READY TO BE MADE: [X] YES    Best Family or Patient Contact (if needed):    Additional Information about above appointments (if needed):    1:   2:   3:     Other comments or requests:

## 2023-01-18 NOTE — DISCHARGE NOTE NURSING/CASE MANAGEMENT/SOCIAL WORK - NSDCPEFALRISK_GEN_ALL_CORE
For information on Fall & Injury Prevention, visit: [email or web address] OR  [email or web address] OR  https://www.cdc.gov/steadi/patient.html

## 2023-01-18 NOTE — PROGRESS NOTE ADULT - SUBJECTIVE AND OBJECTIVE BOX
CARDIOLOGY FOLLOW UP - Dr. Marie  DATE OF SERVICE: 1/18/23    CC  CV events noted.    REVIEW OF SYSTEMS:  CONSTITUTIONAL: No fever, weight loss, or fatigue  RESPIRATORY: No cough, wheezing, chills or hemoptysis; No Shortness of Breath  CARDIOVASCULAR: +Chest pain, No palpitations, passing out, dizziness, or leg swelling  GASTROINTESTINAL: No abdominal or epigastric pain. No nausea, vomiting, or hematemesis; No diarrhea or constipation. No melena or hematochezia.  VASCULAR: No edema     PHYSICAL EXAM:  T(C): 37.1 (01-18-23 @ 09:56), Max: 37.2 (01-17-23 @ 16:00)  HR: 66 (01-18-23 @ 09:56) (66 - 78)  BP: 109/74 (01-18-23 @ 09:56) (107/70 - 155/89)  RR: 18 (01-18-23 @ 09:56) (18 - 21)  SpO2: 99% (01-18-23 @ 05:24) (99% - 100%)  Wt(kg): --  I&O's Summary      Appearance: Normal	  Cardiovascular: Normal S1 S2,RRR, No JVD, No murmurs  Respiratory: Lungs clear to auscultation	  Gastrointestinal:  Soft, Non-tender, + BS	  Extremities: Normal range of motion, No clubbing, cyanosis or edema      Home Medications:  escitalopram 20 mg oral tablet: 1 tab(s) orally once a day (16 Jan 2023 19:25)  Xanax 0.5 mg oral tablet: 1 tab(s) orally 2 times a day, As Needed (16 Jan 2023 19:25)      MEDICATIONS  (STANDING):  enoxaparin Injectable 40 milliGRAM(s) SubCutaneous every 24 hours  escitalopram 20 milliGRAM(s) Oral daily  meclizine 12.5 milliGRAM(s) Oral once      TELEMETRY: 	    ECG:  	  RADIOLOGY:   DIAGNOSTIC TESTING:  [x] Echocardiogram:  < from: Transthoracic Echocardiogram (01.18.23 @ 09:27) >  Conclusions:  1. Normal left ventricular internal dimensions and wall  thicknesses.  2. Normal left ventricular systolic function. No segmental  wall motion abnormalities.  3. Normal right ventricular size and systolic function.  There is a pacing wire directed toward the interventricular  septum that appears to cross the septum into the left  ventricle.  EP team notified.  Images reviewed with Dr. Coleman.  Discussed with Dr. Marie    < end of copied text >    [ ]  Catheterization:  [ ] Stress Test:    OTHER: 	    LABS:	 	    Creatine Kinase, Serum: 64 U/L [25 - 170] (01-17 @ 20:00)  CKMB Units: 1.0 ng/mL [0.0 - 3.8] (01-17 @ 20:00)  Troponin T, High Sensitivity Result: <6 ng/L [0 - 51] (01-17 @ 20:00)  Creatine Kinase, Serum: 66 U/L [25 - 170] (01-17 @ 00:32)  CKMB Units: 1.0 ng/mL [0.0 - 3.8] (01-17 @ 00:32)  Troponin T, High Sensitivity Result: <6 ng/L [0 - 51] (01-17 @ 00:32)  Troponin T, High Sensitivity Result: <6 ng/L [0 - 51] (01-16 @ 18:26)  Troponin T, High Sensitivity Result: <6 ng/L [0 - 51] (01-16 @ 14:05)                          11.4   6.70  )-----------( 226      ( 17 Jan 2023 20:00 )             37.0     01-17    139  |  106  |  16  ----------------------------<  149<H>  3.7   |  21<L>  |  0.88    Ca    9.0      17 Jan 2023 20:00    TPro  6.4  /  Alb  3.5  /  TBili  0.4  /  DBili  x   /  AST  14  /  ALT  12  /  AlkPhos  80  01-17    PT/INR - ( 17 Jan 2023 05:16 )   PT: 13.6 sec;   INR: 1.18 ratio

## 2023-01-18 NOTE — CONSULT NOTE ADULT - ASSESSMENT
Echo 6/27/18L EF 60-65%, min MR, nl LV sys fx   echo 8/10/21 normal LV function.     a/p     53yo F with hx of  atrial tachycardia from her magalis terminalis in 10/16/2012 was found to have dual AV nodes but no AVNRT, SVT s/p ablation, bradycardia, s/p PPM (6/2018 -medtronic),  presents with left sided chest pain.    # atypical chest pain   -pain reproducible, likely musculoskeletal  -no evidence of ACS  -ecg paced with no acute ischemia, trop neg  -CT chest negative for PE  -TTEecho with normal LV function.    -chest xray clear  -interogate PPM to r/o recurrent SVT   -if no events found on interrogation and TTE normal pt can be discharged to followup w Dr Orta        70 minutes spent on total encounter; more than 50% of the visit was spent counseling and/or coordinating care by the attending physician.    -C/W ASA      no cv objection to dc planning.    
53 y/o woman with history of atrial tachycardia from magalis terminalis s/p EPS/ablation (dual AV nodes, but no AVNRT 10/2012), bradycardia s/p ILR and ultimately follow up EP study and implantation of a Medtronic dual chamber PPM (6/2018) for SND, depression on Xanax and Lexapro, who presented to the ED 1/16/23 after taking a shower felt tired, and chest discomfort which she described as a sharp pain that radiated to the back as well. Echocardiogram revealed concern for RV lead septal perforation into LV.    1. Chest pain  2. SND s/p Medtronic PPM (6/2018)  3. Concern for RV lead perforation on echo    - Telemetry with atrial pacing 60-70s  - Echocardiogram/CT images reviewed with EP attending and no clear evidence of lead perforation  - EKG reveals clear RV pacing when force pacing in ventricle  - Normal PPM function (see PPM note)  - Close telemetry monitoring  - Maintain K>4.0 and Mg>2.0  - No EP contraindication to discharge  - Discussed with team  - EP to sign off, reconsult as needed

## 2023-01-18 NOTE — DISCHARGE NOTE NURSING/CASE MANAGEMENT/SOCIAL WORK - PATIENT PORTAL LINK FT
You can access the FollowMyHealth Patient Portal offered by [facility] by registering at the following website: [email or web address]. By joining [employer]’s FollowMyHealth portal, you will also be able to view your health information using other applications (apps) compatible with our system.

## 2023-01-18 NOTE — PROCEDURE NOTE - ADDITIONAL PROCEDURE DETAILS
Indication for interrogation: Chest pain  Presenting rhythm: Atrial pacing 60s  Measured data WNL, normal pacemaker function, Pt is NOT pacemaker dependent  Stored data revealed no new episodes  AP: 99.7%  : 0.4%

## 2023-01-18 NOTE — PROGRESS NOTE ADULT - TIME BILLING
Patient seen and examined, agree with the above assessment and plan by PATY aKiser  CV stable  Appreciate EP eval, no concern for RV lead perforation  PPM interrogation revealed no episodes  no chest pain  pt cleared for DC to f/u w Dr Orta

## 2023-02-02 ENCOUNTER — NON-APPOINTMENT (OUTPATIENT)
Age: 53
End: 2023-02-02

## 2023-02-02 ENCOUNTER — APPOINTMENT (OUTPATIENT)
Dept: ELECTROPHYSIOLOGY | Facility: CLINIC | Age: 53
End: 2023-02-02
Payer: COMMERCIAL

## 2023-02-02 PROCEDURE — 93296 REM INTERROG EVL PM/IDS: CPT

## 2023-02-02 PROCEDURE — 93294 REM INTERROG EVL PM/LDLS PM: CPT

## 2023-05-04 ENCOUNTER — APPOINTMENT (OUTPATIENT)
Dept: ELECTROPHYSIOLOGY | Facility: CLINIC | Age: 53
End: 2023-05-04

## 2023-05-04 NOTE — ED PROVIDER NOTE - DATE/TIME 3
Caller: INSURANCE    Best call back number: [phone]    What was the call regarding: PEYMAN CALLED FROM [facility] 24-Apr-2018 18:55

## 2023-05-09 ENCOUNTER — NON-APPOINTMENT (OUTPATIENT)
Age: 53
End: 2023-05-09

## 2023-05-09 ENCOUNTER — APPOINTMENT (OUTPATIENT)
Dept: ELECTROPHYSIOLOGY | Facility: CLINIC | Age: 53
End: 2023-05-09
Payer: COMMERCIAL

## 2023-05-09 PROCEDURE — 93294 REM INTERROG EVL PM/LDLS PM: CPT

## 2023-05-09 PROCEDURE — 93296 REM INTERROG EVL PM/IDS: CPT

## 2023-05-17 NOTE — ED CDU PROVIDER SUBSEQUENT DAY NOTE - ENMT NEGATIVE STATEMENT, MLM
Patient is a 65y old  Female who presents with a chief complaint of AMS (16 May 2023 16:39)    65y Female with PMHx of Advanced Dementia, DM, hypothyroidism, HTN who presented to the ED due to unresponsiveness in the setting of hypoglycemia, stroke code called in the ED due to confusion even with improvement of blood sugar. The patient's  saw her normal at 5am this morning when he checked her blood sugar which was 80 gave her insulin & levothyroxine and when he came back at 1130am she was unresponsive and foaming from the mouth. Checked her blood sugar which was in the 30s at this time so he called 911. When EMS arrived patient was diaphoretic and cold to the touch, FS was 47 at this time, therefore, dextrose and orange juice were given. Patient became more awake, however, confused and agitated.  states that she has had issues with hypoglycemia in the past, however, this is the most unresponsive she has been secondary to hypoglycemia. No history of seizures.   On arrival to the ED, patient moving all extremities antigravity with right gaze preference which spontaneously overcome. Patient required 2mg of Versed to tolerate CT scan.      In the ER:  Vitals: /80, HR 75, RR 14, T 36.8 oral, SpO2 98% RA  EKG: Sinus Rythm with 1st degree AV block   Sig Labs: Hgb 9.9, Cr 0.5, Trop -ve   Imagin) CXR Left lung opacification is felt to be artifactual in nature. However, a true frontal x-ray is recommended for further evaluation  2) CT Head + Angio: No acute infarct  (16 May 2023 16:39)  seen by Neuro, upgraded to MICU for persistent hypoglycemia on D 10    PAST MEDICAL & SURGICAL HISTORY:  Asymptomatic hypertension      Borderline hypercholesterolemia      Adult hypothyroidism      Atypical diabetes mellitus          SOCIAL HX:   Smoking -    FAMILY HISTORY: UTO      REVIEW OF SYSTEMS UTO    Allergies    No Known Allergies    Intolerances        amLODIPine   Tablet 10 milliGRAM(s) Oral daily  atorvastatin 80 milliGRAM(s) Oral at bedtime  dextrose 10% + sodium chloride 0.45%. 1000 milliLiter(s) IV Continuous <Continuous>  dextrose 5%. 1000 milliLiter(s) IV Continuous <Continuous>  dextrose 5%. 1000 milliLiter(s) IV Continuous <Continuous>  dextrose 50% Injectable 12.5 Gram(s) IV Push once  dextrose 50% Injectable 25 Gram(s) IV Push once  dextrose 50% Injectable 25 Gram(s) IV Push once  dextrose Oral Gel 15 Gram(s) Oral once PRN  enoxaparin Injectable 40 milliGRAM(s) SubCutaneous every 24 hours  glucagon  Injectable 1 milliGRAM(s) IntraMuscular once  levothyroxine 112 MICROGram(s) Oral daily  lisinopril 40 milliGRAM(s) Oral daily  : Home Meds:      PHYSICAL EXAM    ICU Vital Signs Last 24 Hrs  T(C): 36.6 (16 May 2023 23:59), Max: 36.8 (16 May 2023 13:03)  T(F): 97.8 (16 May 2023 23:59), Max: 98.3 (16 May 2023 13:03)  HR: 65 (17 May 2023 05:21) (60 - 76)  BP: 112/59 (17 May 2023 05:21) (112/59 - 183/80)  BP(mean): 93 (16 May 2023 23:59) (93 - 93)  RR: 18 (17 May 2023 05:21) (14 - 19)  SpO2: 96% (17 May 2023 05:21) (96% - 100%)    O2 Parameters below as of 17 May 2023 05:21  Patient On (Oxygen Delivery Method): room air            General: RA, comfortable  HEENT:  DEREK              Lymph Nodes: No cervical LN   Lungs: Bilateral BS  Cardiovascular: Regular  Abdomen: Soft, Positive BS  Extremities: No clubbing  follows simple commands  non focal        LABS:                          9.9    8.28  )-----------( 244      ( 16 May 2023 14:15 )             31.1                                                   137  |  105  |  20  ----------------------------<  148<H>  5.6<H>   |  22  |  0.5<L>    Ca    9.0      16 May 2023 14:15    TPro  6.2  /  Alb  3.3<L>  /  TBili  <0.2  /  DBili  x   /  AST  73<H>  /  ALT  66<H>  /  AlkPhos  105  05-16      PT/INR - ( 16 May 2023 14:15 )   PT: 10.70 sec;   INR: 0.94 ratio         PTT - ( 16 May 2023 14:15 )  PTT:35.4 sec                                       Urinalysis Basic - ( 17 May 2023 04:25 )    Color: Colorless / Appearance: Clear / S.016 / pH: x  Gluc: x / Ketone: Negative  / Bili: Negative / Urobili: <2 mg/dL   Blood: x / Protein: Negative / Nitrite: Negative   Leuk Esterase: Negative / RBC: x / WBC x   Sq Epi: x / Non Sq Epi: x / Bacteria: x        CARDIAC MARKERS ( 16 May 2023 14:15 )  x     / <0.01 ng/mL / 197 U/L / x     / x                                                LIVER FUNCTIONS - ( 16 May 2023 14:15 )  Alb: 3.3 g/dL / Pro: 6.2 g/dL / ALK PHOS: 105 U/L / ALT: 66 U/L / AST: 73 U/L / GGT: x                                                                                                                                       MEDICATIONS  (STANDING):  amLODIPine   Tablet 10 milliGRAM(s) Oral daily  atorvastatin 80 milliGRAM(s) Oral at bedtime  dextrose 10% + sodium chloride 0.45%. 1000 milliLiter(s) (125 mL/Hr) IV Continuous <Continuous>  dextrose 5%. 1000 milliLiter(s) (50 mL/Hr) IV Continuous <Continuous>  dextrose 5%. 1000 milliLiter(s) (100 mL/Hr) IV Continuous <Continuous>  dextrose 50% Injectable 12.5 Gram(s) IV Push once  dextrose 50% Injectable 25 Gram(s) IV Push once  dextrose 50% Injectable 25 Gram(s) IV Push once  enoxaparin Injectable 40 milliGRAM(s) SubCutaneous every 24 hours  glucagon  Injectable 1 milliGRAM(s) IntraMuscular once  levothyroxine 112 MICROGram(s) Oral daily  lisinopril 40 milliGRAM(s) Oral daily    MEDICATIONS  (PRN):  dextrose Oral Gel 15 Gram(s) Oral once PRN Blood Glucose LESS THAN 70 milliGRAM(s)/deciliter         Ears: no ear pain and no hearing problems.Nose: no nasal congestion and no nasal drainage.Mouth/Throat: no dysphagia, no hoarseness and no throat pain.Neck: no lumps, no pain, no stiffness and no swollen glands

## 2023-08-08 ENCOUNTER — NON-APPOINTMENT (OUTPATIENT)
Age: 53
End: 2023-08-08

## 2023-08-08 ENCOUNTER — APPOINTMENT (OUTPATIENT)
Dept: ELECTROPHYSIOLOGY | Facility: CLINIC | Age: 53
End: 2023-08-08
Payer: COMMERCIAL

## 2023-08-08 PROCEDURE — 93294 REM INTERROG EVL PM/LDLS PM: CPT

## 2023-08-08 PROCEDURE — 93296 REM INTERROG EVL PM/IDS: CPT

## 2023-08-09 NOTE — PATIENT PROFILE ADULT. - DOES PATIENT HAVE ADVANCE DIRECTIVE
08/09/23                            Faviola Sanchez  [address] [place] [address]    To Whom It May Concern:    This is to certify Faviola Sanchez was evaluated with Susanna Whitney MD on 08/09/23 and  may return to work on 8/10/23.          Electronically signed by:  Susanna Whitney MD  [facility] [employer]  [id number] [facility] [address]  Dept Phone: [phone]    No

## 2023-11-07 ENCOUNTER — NON-APPOINTMENT (OUTPATIENT)
Age: 53
End: 2023-11-07

## 2023-11-07 ENCOUNTER — APPOINTMENT (OUTPATIENT)
Dept: ELECTROPHYSIOLOGY | Facility: CLINIC | Age: 53
End: 2023-11-07
Payer: COMMERCIAL

## 2023-11-07 PROCEDURE — 93296 REM INTERROG EVL PM/IDS: CPT

## 2023-11-07 PROCEDURE — 93294 REM INTERROG EVL PM/LDLS PM: CPT

## 2023-11-29 NOTE — CHART NOTE - NSCHARTNOTEFT_GEN_A_CORE
Called by the nurse to report that patient felt dizzy and had chest discomfort while using the restroom. Patient seen at bedside, reports that she feels dizzy, has chills and chest discomfort.      Vital Signs Last 24 Hrs  T(C): 36.8 (17 Jan 2023 10:30), Max: 36.9 (16 Jan 2023 20:13)  T(F): 98.2 (17 Jan 2023 10:30), Max: 98.5 (16 Jan 2023 20:13)  HR: 64 (17 Jan 2023 10:30) (62 - 77)  BP: 121/79 (17 Jan 2023 10:30) (96/59 - 122/80)  BP(mean): --  RR: 16 (17 Jan 2023 10:30) (16 - 19)  SpO2: 97% (17 Jan 2023 05:05) (97% - 100%)    Parameters below as of 17 Jan 2023 10:30  Patient On (Oxygen Delivery Method): room air          Labs:                          10.6   5.79  )-----------( 224      ( 17 Jan 2023 05:16 )             33.8     01-17    138  |  107  |  15  ----------------------------<  90  4.2   |  20<L>  |  0.96    Ca    8.6      17 Jan 2023 05:16    TPro  6.4  /  Alb  3.5  /  TBili  0.4  /  DBili  x   /  AST  14  /  ALT  12  /  AlkPhos  80  01-17      CARDIAC MARKERS ( 17 Jan 2023 00:32 )  x     / x     / 66 U/L / x     / 1.0 ng/mL        Radiology:    MEDICATIONS  (STANDING):  enoxaparin Injectable 40 milliGRAM(s) SubCutaneous every 24 hours  escitalopram 20 milliGRAM(s) Oral daily    MEDICATIONS  (PRN):  acetaminophen     Tablet .. 650 milliGRAM(s) Oral every 6 hours PRN Temp greater or equal to 38C (100.4F), Mild Pain (1 - 3)  ALPRAZolam 0.5 milliGRAM(s) Oral two times a day PRN anxiety  aluminum hydroxide/magnesium hydroxide/simethicone Suspension 30 milliLiter(s) Oral every 4 hours PRN Dyspepsia  melatonin 3 milliGRAM(s) Oral at bedtime PRN Insomnia  ondansetron Injectable 4 milliGRAM(s) IV Push every 8 hours PRN Nausea and/or Vomiting      Physical Exam:  General: WN/WD NAD  Neurology: A&Ox3, nonfocal, DIAZ x 4  Head:  Normocephalic, atraumatic  Respiratory: CTA B/L  CV: RRR, S1S2, no murmur  Abdominal: Soft, NT, ND no palpable mass  MSK: No edema, + peripheral pulses, FROM all 4 extremity    Assessment & Plan:  HPI:  51yo F with hx of  atrial tachycardia  SVT s/p ablation, bradycardia, s/p PPM (6/2018 -[employer]), depression on xanax and lexapro, presently with low grade temp and dizziness  > UA stat  > PPM interrogation  > Meclizine 12.5mg x 1  > EKG stat  > CXR/CT chest 1/16- neg for acute infection  > F/u  ECHO  Continue to monitor pt closely        Follow up with Attending in AM.
Confidential Drug Utilization Report  Search Terms: carey Fontanez, 1970Search Date: 01/16/2023 20:14:43 PM  The Drug Utilization Report below displays all of the controlled substance prescriptions, if any, that your patient has filled in the last twelve months. The information displayed on this report is compiled from pharmacy submissions to the Department, and accurately reflects the information as submitted by the pharmacies.    This report was requested by: Maribell Camargo | Reference #: [id number]    There are no results for the search terms that you entered.
Left 2 messages for patient in regards to follow up care with callback information.
Left 1 message for patient in regards to follow up care with callback information.
No, attempted...

## 2023-12-06 ENCOUNTER — NON-APPOINTMENT (OUTPATIENT)
Age: 53
End: 2023-12-06

## 2023-12-06 ENCOUNTER — APPOINTMENT (OUTPATIENT)
Dept: ELECTROPHYSIOLOGY | Facility: CLINIC | Age: 53
End: 2023-12-06
Payer: COMMERCIAL

## 2023-12-06 VITALS
WEIGHT: 200 LBS | HEART RATE: 84 BPM | SYSTOLIC BLOOD PRESSURE: 128 MMHG | OXYGEN SATURATION: 98 % | BODY MASS INDEX: 28.63 KG/M2 | HEIGHT: 70 IN | DIASTOLIC BLOOD PRESSURE: 90 MMHG

## 2023-12-06 PROCEDURE — 93280 PM DEVICE PROGR EVAL DUAL: CPT

## 2023-12-06 PROCEDURE — 93000 ELECTROCARDIOGRAM COMPLETE: CPT | Mod: 59

## 2024-01-05 NOTE — ED PROVIDER NOTE - ST/T WAVE
Continue current feeds for now   Keep nexium 10mg daily   RD check in 3 months   Follow up in 3-4 months     Thank you for choosing [facility]. It was a pleasure to see you for your office visit today.     If you have any questions or scheduling needs during regular office hours, please call: [phone]  If urgent concerns arise after hours, you can call [phone] and ask to speak to the pediatric specialist on call.   If you need to schedule Imaging/Radiology tests, please call: [phone]  [employer] messages are for routine communication and questions and are usually answered within 48-72 hours. If you have an urgent concern or require sooner response, please call us.  Outside lab and imaging results should be faxed to [phone].  If you go to a lab outside of [facility] we will not automatically get those results. You will need to ask to have them faxed.   You may receive a survey regarding your experience with the clinic today. We would appreciate your feedback.   We encourage to you make your follow-up today to ensure a timely appointment. If you are unable to do so please reach out to [phone] as soon as possible.       If you had any blood work, imaging or other tests completed today:  Normal test results will be mailed to your home address in a letter.  Abnormal results will be communicated to you via phone call/letter.  Please allow up to 1-2 weeks for processing and interpretation of most lab work.   
no ST changes

## 2024-03-05 ENCOUNTER — NON-APPOINTMENT (OUTPATIENT)
Age: 54
End: 2024-03-05

## 2024-03-06 ENCOUNTER — APPOINTMENT (OUTPATIENT)
Dept: ELECTROPHYSIOLOGY | Facility: CLINIC | Age: 54
End: 2024-03-06
Payer: COMMERCIAL

## 2024-03-06 PROCEDURE — 93294 REM INTERROG EVL PM/LDLS PM: CPT

## 2024-03-06 PROCEDURE — 93296 REM INTERROG EVL PM/IDS: CPT

## 2024-06-05 ENCOUNTER — NON-APPOINTMENT (OUTPATIENT)
Age: 54
End: 2024-06-05

## 2024-06-05 ENCOUNTER — APPOINTMENT (OUTPATIENT)
Dept: ELECTROPHYSIOLOGY | Facility: CLINIC | Age: 54
End: 2024-06-05
Payer: COMMERCIAL

## 2024-06-05 PROCEDURE — 93294 REM INTERROG EVL PM/LDLS PM: CPT

## 2024-06-05 PROCEDURE — 93296 REM INTERROG EVL PM/IDS: CPT

## 2024-09-04 ENCOUNTER — APPOINTMENT (OUTPATIENT)
Dept: ELECTROPHYSIOLOGY | Facility: CLINIC | Age: 54
End: 2024-09-04
Payer: COMMERCIAL

## 2024-09-04 ENCOUNTER — NON-APPOINTMENT (OUTPATIENT)
Age: 54
End: 2024-09-04

## 2024-09-04 PROCEDURE — 93294 REM INTERROG EVL PM/LDLS PM: CPT

## 2024-09-04 PROCEDURE — 93296 REM INTERROG EVL PM/IDS: CPT

## 2024-11-13 NOTE — DISCHARGE NOTE ADULT - NURSING SECTION COMPLETE
Left message to patient regarding location change   
Patient/Caregiver provided printed discharge information.

## 2024-12-06 ENCOUNTER — APPOINTMENT (OUTPATIENT)
Dept: ELECTROPHYSIOLOGY | Facility: CLINIC | Age: 54
End: 2024-12-06

## 2024-12-27 NOTE — PATIENT PROFILE ADULT - NSPROGENOTHERPROVIDER_GEN_A_NUR
Patient missed a PT visit from [facility] on 12/27/24.     Reason: Daughter cancelled due to dealing with a death in the family.        For your records only.   Per CMS Guidance, MD must be notified of missed/cancelled visits; therefore the prescribed frequency was not met.  none

## 2025-03-07 ENCOUNTER — APPOINTMENT (OUTPATIENT)
Dept: ELECTROPHYSIOLOGY | Facility: CLINIC | Age: 55
End: 2025-03-07

## 2025-03-07 ENCOUNTER — NON-APPOINTMENT (OUTPATIENT)
Age: 55
End: 2025-03-07

## 2025-03-07 PROCEDURE — 93296 REM INTERROG EVL PM/IDS: CPT

## 2025-03-07 PROCEDURE — 93294 REM INTERROG EVL PM/LDLS PM: CPT

## 2025-06-06 ENCOUNTER — APPOINTMENT (OUTPATIENT)
Dept: ELECTROPHYSIOLOGY | Facility: CLINIC | Age: 55
End: 2025-06-06

## 2025-06-06 PROCEDURE — 93296 REM INTERROG EVL PM/IDS: CPT

## 2025-06-06 PROCEDURE — 93294 REM INTERROG EVL PM/LDLS PM: CPT

## 2025-06-17 NOTE — PATIENT PROFILE ADULT. - SURGICAL SITE INCISION
Subjective   Patient ID: Michael is a 53 year old male.    Chief Complaint   Patient presents with    Office Visit    Annual Exam       HPI 53 years old male history of prediabetes, multiple subcutaneous lipomas, perforated right eardrum and more recently gastroesophagitis/H. pylori presented for physical and follow-up.  According to patient he was initially evaluated for epigastric pain and indigestion, referred to ED to rule out ACS and subsequently he had stress test, upper endoscopy and colonoscopy.  Stress test and colonoscopy were normal however endoscopy was remarkable for inflammation of GE junction and gastric lining with positive H. pylori.  Patient was treated with triple medication and advised to take pantoprazole for 1 year.  Patient drink 1 cup of tea and 1 can of caffeinated soda daily.  Recently had extensive blood test through his employer which is remarkable for elevated A1c 5.7, slightly elevated LDL and elevated iron.  Patient has been taking iron supplement for several months with no known reason which he stopped after blood test. Reported no heartburn.        Review of Systems   All other systems reviewed and are negative.      ALLERGIES:  No Known Allergies    Current Outpatient Medications   Medication Sig Dispense Refill    pantoprazole (PROTONIX) 40 MG tablet Take 1 tablet by mouth 2 times daily (before meals) for 14 days. Take twice daily while taking antibiotics for H pylori treatment. This replaces any other PPI prescription while taking antibiotics for H pylori. 28 tablet 0    pantoprazole (PROTONIX) 40 MG tablet Take 1 tablet by mouth daily. Take 30 minutes before breakfast 30 tablet 11     Current Facility-Administered Medications   Medication Dose Route Frequency Provider Last Rate Last Admin    acetaminophen (TYLENOL) tablet 650 mg  650 mg Oral Once            Past Medical History:   Diagnosis Date    Known health problems: none     Left foot pain        History reviewed. No  pertinent surgical history.    Family History   Problem Relation Age of Onset    Diabetes Mother        Social History     Socioeconomic History    Marital status: /Civil Union     Spouse name: Not on file    Number of children: Not on file    Years of education: Not on file    Highest education level: Not on file   Occupational History    Not on file   Tobacco Use    Smoking status: Never    Smokeless tobacco: Never   Vaping Use    Vaping status: never used   Substance and Sexual Activity    Alcohol use: Never    Drug use: Never    Sexual activity: Yes     Partners: Female     Birth control/protection: None   Other Topics Concern    Not on file   Social History Narrative    Not on file     Social Drivers of Health     Financial Resource Strain: Not on file   Food Insecurity: Not on file   Transportation Needs: Not on file   Physical Activity: Not on file   Stress: Not on file   Social Connections: Not on file   Feeling safe in your relationship: Low Risk  (11/16/2024)    Interpersonal Safety     How often physically hurt: Never     How often insulted or talked down to: Never     How often threatened with harm: Never     How often scream or curse at: Never       Patient Care Team:  Golden Mittal MD as PCP - General (Internal Medicine)    I have personally reviewed and updated the following EPIC sections: Current medications, Allergies, Problem list, Past Medical History, Past Surgical History, Social History and Family History    Blood pressure 107/69, pulse 67, temperature 98.3 °F (36.8 °C), temperature source Temporal, resp. rate 16, height 5' 3\" (1.6 m), weight 74.9 kg (165 lb 2 oz), SpO2 99%.  Body mass index is 29.25 kg/m².    Physical Exam  Vitals and nursing note reviewed.   Constitutional:       General: He is not in acute distress.     Appearance: Normal appearance. He is well-developed.   HENT:      Head: Normocephalic and atraumatic.      Right Ear: Hearing, ear canal and external ear normal.       Left Ear: Hearing, tympanic membrane, ear canal and external ear normal.      Ears:      Comments: Perforated right eardrum     Nose: Nose normal.      Mouth/Throat:      Mouth: Mucous membranes are moist. Mucous membranes are not pale and not dry. No oral lesions.      Dentition: Normal dentition.      Pharynx: Oropharynx is clear. No oropharyngeal exudate.   Eyes:      General: Lids are normal.         Right eye: No discharge.         Left eye: No discharge.      Extraocular Movements: Extraocular movements intact.      Conjunctiva/sclera: Conjunctivae normal.      Pupils: Pupils are equal, round, and reactive to light.   Neck:      Thyroid: No thyromegaly.      Vascular: No JVD.   Cardiovascular:      Rate and Rhythm: Normal rate and regular rhythm.      Pulses: Normal pulses.      Heart sounds: Normal heart sounds, S1 normal and S2 normal. No murmur heard.  Pulmonary:      Effort: Pulmonary effort is normal. No respiratory distress.      Breath sounds: Normal breath sounds. No wheezing or rales.   Abdominal:      General: Bowel sounds are normal. There is no distension.      Palpations: Abdomen is soft. There is no mass.      Tenderness: There is no abdominal tenderness. There is no guarding or rebound.   Genitourinary:     Penis: Normal.       Testes: Normal.   Musculoskeletal:         General: No tenderness or deformity. Normal range of motion.      Cervical back: Normal range of motion and neck supple.   Lymphadenopathy:      Cervical: No cervical adenopathy.   Skin:     General: Skin is warm and dry.      Findings: No rash.      Nails: There is no clubbing.      Comments: Multiple subcutaneous lump   Neurological:      General: No focal deficit present.      Mental Status: He is alert and oriented to person, place, and time.      Cranial Nerves: No cranial nerve deficit.      Sensory: No sensory deficit.      Coordination: Coordination normal.   Psychiatric:         Mood and Affect: Mood normal.          Speech: Speech normal.         Behavior: Behavior normal.         Thought Content: Thought content normal.         Judgment: Judgment normal.          Lab: CBC CMP lipid A1c TFT uric acid iron          Most Recent Immunizations   Administered Date(s) Administered    COVID Moderna 0.5 mL 12Y+ 04/17/2021    COVID Moderna Booster 0.25 mL 12Y+ 01/13/2022    Tdap 07/26/2018    Zoster recombinant 08/17/2023       Procedures        No orders of the defined types were placed in this encounter.      Assessment   Problem List Items Addressed This Visit          ENT    Perforated tympanic membrane, right       Endocrine and Metabolic    Prediabetes       Gastrointestinal and Abdominal    Gastroesophageal reflux disease with esophagitis without hemorrhage    Helicobacter pylori infection       Health Encounters    Annual physical exam - Primary       Hematology and Neoplasia    Lipoma     1. Annual physical exam    2. Prediabetes    3. Lipoma, unspecified site    4. Helicobacter pylori infection    5. Gastroesophageal reflux disease with esophagitis without hemorrhage    6. Perforated tympanic membrane, right         PLAN: Advised to 1800-calorie ADA diet and increase cardiovascular activity as tolerated.  Lower LDL under 100, low-cholesterol low-fat diet.  Asymptomatic lipoma, no treatment needed.  H. pylori treated with triple medication, subsequent stool test negative for H. pylori.  Avoid caffeinated carbonated and citrus beverages.  Continue pantoprazole as recommended by GI service.  Stable perforated right eardrum without infection.  Check PSA.  Educated regarding skin care.  Continue taking vitamin D3 and multivitamin without iron.               Time Spent    Golden Mittal MD                  This note was created using the Dragon voice recognition system. Errors in content may be related to improper recognition of the system. Effort to review and correct the note has been made but irregularities may still be present.     no

## 2025-08-04 ENCOUNTER — NON-APPOINTMENT (OUTPATIENT)
Age: 55
End: 2025-08-04

## 2025-08-04 ENCOUNTER — APPOINTMENT (OUTPATIENT)
Dept: ELECTROPHYSIOLOGY | Facility: CLINIC | Age: 55
End: 2025-08-04
Payer: COMMERCIAL

## 2025-08-04 VITALS — SYSTOLIC BLOOD PRESSURE: 120 MMHG | HEART RATE: 71 BPM | DIASTOLIC BLOOD PRESSURE: 83 MMHG | OXYGEN SATURATION: 99 %

## 2025-08-04 PROCEDURE — 93280 PM DEVICE PROGR EVAL DUAL: CPT

## 2025-08-04 PROCEDURE — 93000 ELECTROCARDIOGRAM COMPLETE: CPT | Mod: 59

## 2025-08-04 PROCEDURE — 99213 OFFICE O/P EST LOW 20 MIN: CPT

## 2025-08-04 RX ORDER — BUPROPION HYDROCHLORIDE 300 MG/1
300 TABLET, EXTENDED RELEASE ORAL DAILY
Refills: 0 | Status: ACTIVE | COMMUNITY
Start: 2025-08-04

## 2025-08-04 RX ORDER — EMPAGLIFLOZIN 10 MG/1
10 TABLET, FILM COATED ORAL
Refills: 0 | Status: ACTIVE | COMMUNITY
Start: 2025-08-04

## 2025-08-04 RX ORDER — ESCITALOPRAM OXALATE 5 MG/1
5 TABLET, FILM COATED ORAL
Refills: 0 | Status: ACTIVE | COMMUNITY
Start: 2025-08-04

## 2025-08-04 RX ORDER — CYCLOBENZAPRINE HYDROCHLORIDE 5 MG/1
5 TABLET, FILM COATED ORAL
Refills: 0 | Status: ACTIVE | COMMUNITY
Start: 2025-08-04

## 2025-08-04 RX ORDER — FAMOTIDINE 40 MG/1
40 TABLET, FILM COATED ORAL
Refills: 0 | Status: ACTIVE | COMMUNITY
Start: 2025-08-04

## 2025-08-04 RX ORDER — MECLIZINE HYDROCHLORIDE 12.5 MG/1
12.5 TABLET ORAL
Refills: 0 | Status: ACTIVE | COMMUNITY
Start: 2025-08-04

## 2025-08-04 RX ORDER — NALTREXONE HCL 4.5 MG
4.5 CAPSULE ORAL
Refills: 0 | Status: ACTIVE | COMMUNITY
Start: 2025-08-04

## 2025-08-04 RX ORDER — SPIRONOLACTONE 25 MG/1
25 TABLET, FILM COATED ORAL
Refills: 0 | Status: ACTIVE | COMMUNITY
Start: 2025-08-04

## 2025-08-04 RX ORDER — ALPRAZOLAM 0.25 MG/1
0.25 TABLET ORAL
Refills: 0 | Status: ACTIVE | COMMUNITY
Start: 2025-08-04